# Patient Record
Sex: FEMALE | Race: WHITE | NOT HISPANIC OR LATINO | Employment: FULL TIME | URBAN - METROPOLITAN AREA
[De-identification: names, ages, dates, MRNs, and addresses within clinical notes are randomized per-mention and may not be internally consistent; named-entity substitution may affect disease eponyms.]

---

## 2017-01-09 ENCOUNTER — ALLSCRIPTS OFFICE VISIT (OUTPATIENT)
Dept: OTHER | Facility: OTHER | Age: 27
End: 2017-01-09

## 2017-02-02 ENCOUNTER — ALLSCRIPTS OFFICE VISIT (OUTPATIENT)
Dept: OTHER | Facility: OTHER | Age: 27
End: 2017-02-02

## 2017-04-13 ENCOUNTER — GENERIC CONVERSION - ENCOUNTER (OUTPATIENT)
Dept: OTHER | Facility: OTHER | Age: 27
End: 2017-04-13

## 2017-04-13 ENCOUNTER — ALLSCRIPTS OFFICE VISIT (OUTPATIENT)
Dept: OTHER | Facility: OTHER | Age: 27
End: 2017-04-13

## 2017-04-13 DIAGNOSIS — N63.0 BREAST LUMP: ICD-10-CM

## 2017-04-24 ENCOUNTER — ALLSCRIPTS OFFICE VISIT (OUTPATIENT)
Dept: OTHER | Facility: OTHER | Age: 27
End: 2017-04-24

## 2017-04-24 ENCOUNTER — LAB REQUISITION (OUTPATIENT)
Dept: LAB | Facility: HOSPITAL | Age: 27
End: 2017-04-24
Payer: COMMERCIAL

## 2017-04-24 DIAGNOSIS — J02.9 ACUTE PHARYNGITIS: ICD-10-CM

## 2017-04-24 LAB — S PYO AG THROAT QL: NEGATIVE

## 2017-04-24 PROCEDURE — 87070 CULTURE OTHR SPECIMN AEROBIC: CPT | Performed by: NURSE PRACTITIONER

## 2017-04-24 PROCEDURE — 87147 CULTURE TYPE IMMUNOLOGIC: CPT | Performed by: NURSE PRACTITIONER

## 2017-04-25 LAB — BACTERIA THROAT CULT: NORMAL

## 2017-04-26 ENCOUNTER — GENERIC CONVERSION - ENCOUNTER (OUTPATIENT)
Dept: OTHER | Facility: OTHER | Age: 27
End: 2017-04-26

## 2017-07-13 ENCOUNTER — ALLSCRIPTS OFFICE VISIT (OUTPATIENT)
Dept: OTHER | Facility: OTHER | Age: 27
End: 2017-07-13

## 2017-08-10 ENCOUNTER — ALLSCRIPTS OFFICE VISIT (OUTPATIENT)
Dept: OTHER | Facility: OTHER | Age: 27
End: 2017-08-10

## 2017-09-06 ENCOUNTER — ALLSCRIPTS OFFICE VISIT (OUTPATIENT)
Dept: OTHER | Facility: OTHER | Age: 27
End: 2017-09-06

## 2017-09-06 DIAGNOSIS — Z13.0 ENCOUNTER FOR SCREENING FOR DISEASES OF THE BLOOD AND BLOOD-FORMING ORGANS AND CERTAIN DISORDERS INVOLVING THE IMMUNE MECHANISM: ICD-10-CM

## 2017-09-06 DIAGNOSIS — Z13.228 ENCOUNTER FOR SCREENING FOR OTHER METABOLIC DISORDERS: ICD-10-CM

## 2017-09-06 DIAGNOSIS — Z13.6 ENCOUNTER FOR SCREENING FOR CARDIOVASCULAR DISORDERS: ICD-10-CM

## 2017-09-06 DIAGNOSIS — Z13.29 ENCOUNTER FOR SCREENING FOR OTHER SUSPECTED ENDOCRINE DISORDER: ICD-10-CM

## 2017-12-19 ENCOUNTER — GENERIC CONVERSION - ENCOUNTER (OUTPATIENT)
Dept: OTHER | Facility: OTHER | Age: 27
End: 2017-12-19

## 2018-01-11 NOTE — PROGRESS NOTES
Assessment    1  Encounter for preventive health examination (V70 0) (Z00 00)   2  Bug bite (919 4,E906 4) (W57 XXXA)    Plan  Bug bite    · Mupirocin 2 % External Ointment; APPLY SPARINGLY TO AFFECTED AREA(S)  TWICE DAILY  Flu vaccine need    · Fluzone Quadrivalent Intramuscular Suspension  PPD screening test    · PPD  Screening for cardiovascular condition    · (1) LIPID PANEL, FASTING; Status:Active; Requested VNX:[id number];   Screening for endocrine/metabolic/immunity disorders    · (1) CBC/ PLT (NO DIFF); Status:Active; Requested HAVEN:76APF0715;    · (1) COMPREHENSIVE METABOLIC PANEL; Status:Active; Requested FJJ:[id number];     Discussion/Summary  health maintenance visit healthy adult female Currently, she eats a poor diet and has an inadequate exercise regimen  cervical cancer screening is current Breast cancer screening: breast cancer screening is not indicated  Colorectal cancer screening: colorectal cancer screening is not indicated  Osteoporosis screening: bone mineral density testing is not indicated  Screening lab work includes hemoglobin, glucose and lipid profile  The immunizations are needed and immunizations will be given as outlined in the orders  Advice and education were given regarding nutrition and aerobic exercise  Patient discussion: discussed with the patient  PPD administered and to be read in 48-72 hours  Work PE form filled out and can be picked up when PPD read  Right foot with what appears as bug bite that blistered and opened  Will prescribe topical antibiotic  Instructed to call with any worsening redness or drainage  Chief Complaint  Pt is here for work PE  History of Present Illness  HM, Adult Female: The patient is being seen for a health maintenance evaluation  Social History: Household members include domestic partner  She is unmarried  Work status: working full time and occupation:   General Health:  The patient's health since the last visit is described as good  She has regular dental visits  She denies vision problems  She denies hearing loss  Immunizations status: up to date  Lifestyle:  She does not have a healthy diet  Diet problems: needs elimination of junk food and high in salt  She has weight concerns  She does not exercise regularly  She uses tobacco  She denies alcohol use  She denies drug use  Reproductive health: the patient is premenopausal   she uses contraception  For contraception, she uses oral contraception pills  she is sexually active  She is monogamous with a male partner  Screening: Cervical cancer screening includes a pap smear performed 2015  Breast cancer screening includes no previous mammogram  She hasn't been previously screened for colorectal cancer  Metabolic screening includes no previous lipid profile, no previous glucose screening and no previous thyroid function test      Cardiovascular risk factors: obesity and sedentary lifestyle  Risk findings: no anxiety symptoms, no depression symptoms, no travel to developing areas and no tuberculosis exposure  HPI: Starting new job as   Has a bug bite on right foot  Appeared 3 days ago  started as bump and over night became hot and itchy  Woke the next day with an open blister  Draining  washed with antibiotic soap and has been putting Neosporin on and keeping it covered  Does not seem to be getting any worse just not getting better  Denies fever,chills  Review of Systems    Constitutional: no fever, not feeling poorly, no chills and not feeling tired  Eyes: No complaints of eye pain, no red eyes, no eyesight problems, no discharge, no dry eyes, no itching of eyes  ENT: earache, but no sore throat, no hearing loss and no nasal discharge  Cardiovascular: no chest pain, no palpitations and no lower extremity edema  Respiratory: no shortness of breath, no cough and no wheezing     Gastrointestinal: no abdominal pain, no nausea, no constipation and no diarrhea  Genitourinary: no dysuria and no pelvic pain  Musculoskeletal: no arthralgias and no myalgias  Integumentary: itching, skin lesion and skin wound, but no rashes, no breast pain and no breast lump  Neurological: no headache, no numbness, no tingling, no dizziness, no limb weakness, no fainting and no difficulty walking  Psychiatric: no anxiety, no sleep disturbances and no depression  Hematologic/Lymphatic: No complaints of swollen glands, no swollen glands in the neck, does not bleed easily, does not bruise easily  Active Problems    1  ADD (attention deficit disorder) without hyperactivity (314 00) (F98 8)   2  Depression screening (V79 0) (Z13 89)   3  Encounter for gynecological examination without abnormal finding (V72 31) (Z01 419)   4  External hemorrhoids (455 3) (K64 4)   5  Flu vaccine need (V04 81) (Z23)   6  Generalized anxiety disorder (300 02) (F41 1)   7  Obesity (278 00) (E66 9)   8  Oral contraceptive use (V25 41) (Z30 41)   9   Surveillance of previously prescribed contraceptive method (V25 40) (Z30 40)    Past Medical History    · History of Acute bronchitis due to other specified organisms (466 0) (J20 8)   · History of Acute pharyngitis, unspecified etiology (462) (J02 9)   · History of Alcohol abuse (305 00) (F10 10)   · History of Cyst of breast, left (610 0) (N60 02)   · History of backache (V13 59) (Z87 39)   · History of depression (V11 8) (Z86 59)   · History of lump of left breast (V13 89) (X56 806)   · History of nicotine dependence (V15 82) (Z87 891)   · Denied: History of substance abuse    Surgical History    · History of Tonsillectomy    Family History  Mother    · Family history of Alcohol abuse   · Family history of depression (V17 0) (Z81 8)   · Family history of substance abuse (V17 0) (Z81 4)  Father    · Family history of Alcohol abuse   · Family history of depression (V17 0) (Z81 8)   · Family history of substance abuse (V17 0) (Z81 4)  Sibling    · Family history of Alcohol abuse   · Family history of depression (V17 0) (Z81 8)   · Family history of substance abuse (V17 0) (Z81 4)  Paternal Grandfather    · Family history of Coronary artery disease   · Family history of arthritis (V17 7) (Z82 61)  Family History    · Family history of arthritis (V17 7) (Z82 61)    Social History    · Current every day smoker (305 1) (F17 200)   · Daily caffeine consumption, 2-3 servings a day   · History of Former smoker (V15 82) (T79 696)   · Full-time employment   · Oral contraceptive use (V25 41) (Z30 41)    Current Meds   1  EC-Naprosyn 500 MG Oral Tablet Delayed Release; TAKE 1 TABLET TWICE DAILY   AFTER MEALS AS NEEDED; Therapy: 46FON3579 to (Evaluate:67Emb3256)  Requested for: 72MYL7288; Last   Rx:79Trw6659 Ordered   2  Levonorgestrel-Ethinyl Estrad 0 1-20 MG-MCG Oral Tablet; TAKE 1 TABLET DAILY; Therapy: 79JOS3133 to (Evaluate:08Jun2017)  Requested for: 13Apr2017; Last   Rx:84Dvh7915 Ordered   3  Multiple Vitamins Oral Tablet; Take 1 daily Recorded   4  ProAir  (90 Base) MCG/ACT Inhalation Aerosol Solution; INHALE 2 PUFFS   EVERY 4 HOURS AS NEEDED FOR COUGH AND WHEEZE;   Therapy: 76Pyd3956 to (Last Rx:03Iki5277)  Requested for: 72Ocq7702 Ordered   5  Sertraline HCl - 50 MG Oral Tablet; take 1 1/2 tablets daily; Therapy: 68FCP7524 to (Evaluate:30Jan2018)  Requested for: 87Mxq4983; Last   Rx:99Tuu0727 Ordered   6  Vyvanse 50 MG Oral Capsule; TAKE 1 CAPSULE DAILY IN THE MORNING; Therapy: 88RFV8850 to (Evaluate:01Oct2017); Last Rx:67Nfe4507 Ordered    Allergies    1  No Known Drug Allergies    Vitals   Recorded: 17OOT0118 08:37AM   Temperature 99 1 F   Heart Rate 72   Systolic 247   Diastolic 68   Height 5 ft 10 in   Weight 233 lb    BMI Calculated 33 43   BSA Calculated 2 23     Physical Exam    Constitutional   General appearance: No acute distress, well appearing and well nourished      Eyes   Conjunctiva and lids: No swelling, erythema or discharge  Pupils and irises: Equal, round, reactive to light  Ears, Nose, Mouth, and Throat   External inspection of ears and nose: Normal     Otoscopic examination: Tympanic membranes translucent with normal light reflex  Canals patent without erythema  Lips, teeth, and gums: Normal, good dentition  Oropharynx: Normal with no erythema, edema, exudate or lesions  Neck   Neck: Supple, symmetric, trachea midline, no masses  Thyroid: Normal, no thyromegaly  Pulmonary   Respiratory effort: No increased work of breathing or signs of respiratory distress  Auscultation of lungs: Clear to auscultation  Cardiovascular   Auscultation of heart: Normal rate and rhythm, normal S1 and S2, no murmurs  Carotid pulses: 2+ bilaterally  no bruit heard over the right carotid and no bruit heard over the left carotid  Examination of extremities for edema and/or varicosities: Normal     Abdomen   Abdomen: Non-tender, no masses  Liver and spleen: No hepatomegaly or splenomegaly  Lymphatic   Palpation of lymph nodes in neck: No lymphadenopathy  Musculoskeletal   Gait and station: Normal     Digits and nails: Normal without clubbing or cyanosis  Joints, bones, and muscles: Normal     Stability: Normal     Muscle strength/tone: Normal     Skin   Examination of the skin for lesions: Abnormal   Right medal aspect of foot with 4 cm open bullae with some redness however no surrounding erythema     Psychiatric   Orientation to person, place, and time: Normal     Mood and affect: Normal        Future Appointments    Date/Time Provider Specialty Site   09/09/2017 08:20 Marcela Arellano, Nurse Schedule  Charles Ruiz MD   12/14/2017 01:00 PM Duncan Quinn, [address] [facility] Cristóbal Sauceda MD     Signatures   Electronically signed by : Bertin Muniz ; Sep  6 2017 10:34AM EST                       (Author)    Electronically signed by : Lorraine Marcum DO; Sep  6 2017  3:20PM EST (Author)

## 2018-01-12 VITALS
HEIGHT: 70 IN | WEIGHT: 233.2 LBS | TEMPERATURE: 98 F | HEART RATE: 78 BPM | BODY MASS INDEX: 33.39 KG/M2 | SYSTOLIC BLOOD PRESSURE: 122 MMHG | DIASTOLIC BLOOD PRESSURE: 70 MMHG

## 2018-01-12 VITALS
WEIGHT: 235.4 LBS | DIASTOLIC BLOOD PRESSURE: 72 MMHG | HEART RATE: 70 BPM | BODY MASS INDEX: 33.7 KG/M2 | TEMPERATURE: 98.2 F | HEIGHT: 70 IN | SYSTOLIC BLOOD PRESSURE: 126 MMHG

## 2018-01-12 VITALS
TEMPERATURE: 99.1 F | HEIGHT: 70 IN | HEART RATE: 72 BPM | WEIGHT: 233 LBS | SYSTOLIC BLOOD PRESSURE: 128 MMHG | BODY MASS INDEX: 33.36 KG/M2 | DIASTOLIC BLOOD PRESSURE: 68 MMHG

## 2018-01-13 VITALS
SYSTOLIC BLOOD PRESSURE: 132 MMHG | WEIGHT: 244.57 LBS | TEMPERATURE: 98.1 F | HEIGHT: 70 IN | BODY MASS INDEX: 35.01 KG/M2 | DIASTOLIC BLOOD PRESSURE: 84 MMHG | HEART RATE: 80 BPM

## 2018-01-13 NOTE — RESULT NOTES
Message   Patient aware  Verified Results  * XR SPINE LUMBAR MINIMUM 4 VIEWS [id number] 09:29AM Darrell Camacho Order Number: [id number]     Test Name Result Flag Reference   XR SPINE LUMBAR MINIMUM 4 VIEWS (Report)     LUMBAR SPINE     INDICATION: Lower back pain  For one month, no known injury     COMPARISON: None     VIEWS: AP, lateral, bilateral oblique and coned down projections; 5 images     FINDINGS:   There are 4 nonrib-bearing lumbar vertebrae  Alignment is unremarkable  There is no radiographic evidence of acute fracture or destructive osseous lesion  No significant lumbar degenerative change noted  Visualized soft tissues appear unremarkable  IMPRESSION:     No acute findings  Workstation performed: [id number]     Signed by:   Talisha Becerril MD   7/26/16       Discussion/Summary   Xrays are normal- no bone tumors or disc disease

## 2018-01-14 VITALS
DIASTOLIC BLOOD PRESSURE: 76 MMHG | BODY MASS INDEX: 34.5 KG/M2 | TEMPERATURE: 98 F | HEART RATE: 74 BPM | SYSTOLIC BLOOD PRESSURE: 122 MMHG | WEIGHT: 241 LBS | HEIGHT: 70 IN

## 2018-01-14 NOTE — MISCELLANEOUS
Message   Recorded as Task   Date: 05/24/2016 08:45 AM, Created By: Uriel Bell   Task Name: Medical Complaint Callback   Assigned To: [facility],Team   Regarding Patient: Porsha Reynolds, Status: Active   Comment:    Jennifer Cristobal - 24 May 2016 8:45 AM     TASK CREATED  Caller: Self; [phone] (Home)  JENNIFER LEFT A MESSAGE ASKING FOR A CALL BACK ABOUT HER MEDS  JaredEdilia - 24 May 2016 9:52 AM     TASK EDITED  Pt inquiring about her med Vyanase  I checked the annotation and Wesly Llanos mailed it to [facility] on May 11th  Pt aware        Active Problems    1  ADD (attention deficit disorder) without hyperactivity (314 00) (F90 0)   2  Depression screening (V79 0) (Z13 89)   3  Encounter for gynecological examination without abnormal finding (V72 31) (Z01 419)   4  Flu vaccine need (V04 81) (Z23)   5  Generalized anxiety disorder (300 02) (F41 1)   6  Obesity (278 00) (E66 9)   7  Oral contraceptive use (V25 41) (Z30 41)    Current Meds   1  Levonorgestrel-Ethinyl Estrad 0 1-20 MG-MCG Oral Tablet; TAKE 1 TABLET DAILY AS   DIRECTED; Therapy: 34QRJ3212 to (Cas Avila)  Requested for: 29UXB1195; Last   Rx:91Miv7422 Ordered   2  Multiple Vitamins Oral Tablet; Take 1 daily Recorded   3  Sertraline HCl - 50 MG Oral Tablet; take one tablet by mouth one time daily; Therapy: 62NMQ7057 to ([facility])  Requested for: 74FFU0596; Last   Rx:05Eys9820 Ordered   4  Vyvanse 30 MG Oral Capsule; TAKE 1 CAPSULE DAILY IN THE MORNING; Therapy: 58DRW1070 to (Evaluate:58Dqt2562); Last Rx:73Rlp1357 Ordered    Allergies    1   No Known Drug Allergies    Signatures   Electronically signed by : Caroline Rodrigues; May 24 2016  4:59PM EST                       (Author)

## 2018-01-14 NOTE — PROGRESS NOTES
Assessment    1  ADD (attention deficit disorder) without hyperactivity (314 00) (F90 0)   2  Generalized anxiety disorder (300 02) (F41 1)    Discussion/Summary    ADD very well controlled on Vyvanse  Will continue on this dose  Anxiety well controlled  Will continue on current sertraline dose  F/U in 4 months  Possible side effects of new medications were reviewed with the patient/guardian today  The treatment plan was reviewed with the patient/guardian  The patient/guardian understands and agrees with the treatment plan   The patient was counseled regarding instructions for management, impressions, risks and benefits of treatment options, importance of compliance with treatment  total time of encounter was 15 minutes and 10 minutes was spent counseling  Chief Complaint  Patient is here today for follow up of chronic conditions described in HPI  History of Present Illness  States had stressful couple of months with death of grandmother and boyfriend's aunt  States she feel she handles it very well  Did feel more scattered  States she is very pleased with Vyvanse  Takes at 7 am and feels effect until about 5 pm  Does sometimes work until 8 but doesn't have any trouble or feel the need for a booster dose  Won't take med if she is laying around and doesn't have anything to do  States prescription is expensive though  Very happy with results so does not want to switch  States anxiety has been very well controlled especially with current events  Feels happy  The patient's ADHD subtype is the predominantly inattentive type  Her ADHD has been well controlled since the last visit  Comorbid Illnesses: anxiety  Target Symptoms:   1  Hyperactive behavior is denied  2  Impulsive behavior is denied  3  Difficulty concentrating is denied  Medications: Vyvanse  The patient takes her medications most of the time   Medication side effects include weight loss, but no anorexia, no headache, no problems sleeping, no abdominal pain and no nausea  Review of Systems    Constitutional: as noted in HPI  Gastrointestinal: as noted in HPI  Neurological: as noted in HPI  Psychiatric: as noted in HPI  Active Problems    1  ADD (attention deficit disorder) without hyperactivity (314 00) (F90 0)   2  Depression screening (V79 0) (Z13 89)   3  Encounter for gynecological examination without abnormal finding (V72 31) (Z01 419)   4  Flu vaccine need (V04 81) (Z23)   5  Generalized anxiety disorder (300 02) (F41 1)   6  Nicotine dependence (305 1) (F17 200)   7  Obesity (278 00) (E66 9)   8  Oral contraceptive use (V25 41) (Z30 41)    Past Medical History    1  History of Cyst of breast, left (610 0) (N60 02)    Surgical History    1  History of Tonsillectomy    Family History    1  Family history of Coronary artery disease   2  Family history of arthritis (V17 7) (Z82 61)    3  Family history of arthritis (V17 7) (Z82 61)    Social History    · Current every day smoker (305 1) (F17 200)   · Daily caffeine consumption, 2-3 servings a day   · Full-time employment   · Oral contraceptive use (V25 41) (Z30 41)  The social history was reviewed and updated today  The social history was reviewed and is unchanged  Current Meds   1  Levonorgestrel-Ethinyl Estrad 0 1-20 MG-MCG Oral Tablet; TAKE 1 TABLET DAILY AS   DIRECTED; Therapy: 99YCB7417 to (Evaluate:12Jun2016); Last Rx:18Jun2015 Ordered   2  Multiple Vitamins Oral Tablet; Take 1 daily Recorded   3  Sertraline HCl - 50 MG Oral Tablet; take one tablet by mouth one time daily; Therapy: 15ZMQ3750 to (Evaluate:01Apr2016); Last Rx:04Oct2015; Status: ACTIVE -   Renewal Voided Ordered   4  Vyvanse 30 MG Oral Capsule; TAKE 1 CAPSULE DAILY IN THE MORNING; Therapy: 84KAD3263 to (Evaluate:34Zrr4138); Last Rx:06Jan2016 Ordered    The medication list was reviewed and updated today  Allergies    1   No Known Drug Allergies    Vitals  Vital Signs Aashish Fountain Includes: Current Encounter]    Recorded: 21Jan2016 09:58AM   Temperature 98 5 F   Heart Rate 82   Respiration 12   Systolic 938   Diastolic 60   Height 5 ft 10 in   Weight 217 lb    BMI Calculated 31 14   BSA Calculated 2 16     Physical Exam    Constitutional   General appearance: No acute distress, well appearing and well nourished  Psychiatric   Orientation to person, place, and time: Normal     Mood and affect: Normal   Pleasant  Conversive with good eye contact          Future Appointments    Date/Time Provider Specialty Site   05/11/2016 09:40 AM Paige Carrillo, [address] [facility] Cristóbal Sauceda MD     Signatures   Electronically signed by : Bertin Parson ; Jan 21 2016 10:24AM EST                       (Author)    Electronically signed by : Jerod Leung DO; Jan 21 2016 10:35AM EST                       (Author)

## 2018-01-15 NOTE — MISCELLANEOUS
Provider Comments  Provider Comments:   Pt was a no show for 2 month f/u        Signatures   Electronically signed by : Bertin [address]; Nov 10 2016  9:22AM EST                       (Author)

## 2018-01-15 NOTE — RESULT NOTES
Verified Results  (1) THROAT CULTURE (CULTURE, UPPER RESPIRATORY) 24Apr2017 03:00PM Roxann Stephens     Test Name Result Flag Reference   CLINICAL REPORT (Report)     Test:        Throat culture  Specimen Type:   Throat  Specimen Date:   4/24/2017 3:00 PM  Result Date:    4/25/2017 10:05 AM  Result Status:   Final result  Resulting Lab:   [place] [address]            Tel: [phone]      CULTURE                                       ------------------                                   3+ Growth of Beta Hemolytic Streptococcus Group A     *** This organism is intrinisically susceptible to Penicillin  If sensitivites to other antibiotics are required, please call the      Microbiology Department at [phone] within 5 days   ***

## 2018-01-17 NOTE — MISCELLANEOUS
Provider Comments  Provider Comments:   Pt was again a no show for f/u appointment        Signatures   Electronically signed by : Carli Herman, 10 Jarrett Perez; Jan 9 2017  3:07PM EST                       (Author)

## 2018-01-17 NOTE — MISCELLANEOUS
Message  Return to work or school:   Alcira Smith is under my professional care  She was seen in my office on 9/6/17   She is able to return to work on  9/6/27       Bertin Romero        Signatures   Electronically signed by : Shirley Bajwa; Sep  6 2017  9:09AM EST                       (Author)

## 2018-01-18 NOTE — MISCELLANEOUS
Message  Return to work or school:   Nick Zamorano is under my professional care  She was seen in my office on 7/19/16   She is able to return to work on  In one to 2 days              Future Appointments    Signatures   Electronically signed by : Kat Pan MD; Jul 20 2016 10:06AM EST                       (Author)

## 2018-01-22 VITALS
SYSTOLIC BLOOD PRESSURE: 124 MMHG | TEMPERATURE: 97.3 F | BODY MASS INDEX: 34.22 KG/M2 | HEART RATE: 78 BPM | DIASTOLIC BLOOD PRESSURE: 72 MMHG | HEIGHT: 70 IN | WEIGHT: 239 LBS

## 2018-01-23 NOTE — PROGRESS NOTES
Assessment    1  Generalized anxiety disorder (300 02) (F41 1)   2  ADD (attention deficit disorder) without hyperactivity (314 00) (F98 8)   3  Left breast lump (611 72) (N63)    Plan   ADD (attention deficit disorder) without hyperactivity, Health Maintenance, Generalized  anxiety disorder    · Vyvanse 40 MG Oral Capsule; Take one capsule each morning  SocHx: Oral contraceptive use    · Levonorgestrel-Ethinyl Estrad 0 1-20 MG-MCG Oral Tablet; TAKE 1 TABLET  DAILY    *US BREAST LEFT LIMITED (DIAGNOSTIC); Status:Hold For - Scheduling; Requested for:13Apr2017;   Perform:[facility] Radiology; Due:13Apr2018; Ordered; For:Left breast lump; Ordered By:Elida Vaca;      Discussion/Summary    RUDOLPH improved with improvement in depressive symptoms that surfaced at last OV  Continue on 75 mg sertraline  ADHD controlling symptoms at work  Suspect difficulty with organization at home is other etiology and not r/t ADHD  Will continue with current Vyvanse dose  Will US left breast likely just fibrocystic  F/U in 3 months  Due for GYN and recommend she schedule  Possible side effects of new medications were reviewed with the patient/guardian today  The treatment plan was reviewed with the patient/guardian  The patient/guardian understands and agrees with the treatment plan      Chief Complaint  Patient is here today for follow up of chronic conditions described in HPI  History of Present Illness  Good concentration and focus at work  Feels more scattered at home  Denies panic attacks  Anxiety much better  Denies depressive symptoms  Improved after dose increase  Denies SI  Has issues with breast cysts  Left breast feels more lumpy  Not painful  Definitely feels different  States she US years ago that showed cysts  The patient is being seen for a routine clinic follow-up of anxiety disorder  It is classified as generalized anxiety disorder   Management changes made at the last visit include changing the dose of sertraline  Symptoms:  no palpitations, no chest discomfort, no trouble breathing, no abdominal discomfort, no excessive worrying, no fear of losing control and no sleep disturbance  Suicidal risk:  no suicidal thoughts  Homicidal risk:  no homicidal thoughts  Current treatment includes selective serotonin reuptake inhibitors  There are no medication side effects  Pertinent psychiatric history:  ADHD  Review of Systems    Constitutional: recent 5 lb weight loss  Integumentary: as noted in HPI  Active Problems    1  ADD (attention deficit disorder) without hyperactivity (314 00) (F98 8)   2  Depression screening (V79 0) (Z13 89)   3  Encounter for gynecological examination without abnormal finding (V72 31) (Z01 419)   4  Flu vaccine need (V04 81) (Z23)   5  Generalized anxiety disorder (300 02) (F41 1)   6  Obesity (278 00) (E66 9)   7  Oral contraceptive use (V25 41) (Z30 41)    Past Medical History    1  History of Acute bronchitis due to other specified organisms (466 0) (J20 8)   2  History of Cyst of breast, left (610 0) (N60 02)   3  History of backache (V13 59) (Z87 39)   4  History of nicotine dependence (V15 82) (P54 637)    Surgical History    1  History of Tonsillectomy    Family History  Paternal Grandfather    1  Family history of Coronary artery disease   2  Family history of arthritis (V17 7) (Z82 61)  Family History    3  Family history of arthritis (V17 7) (Z82 61)    Social History    · Current every day smoker (305 1) (F17 200)   · Daily caffeine consumption, 2-3 servings a day   · History of Former smoker (T15 52) (A08 582)   · Full-time employment   · Oral contraceptive use (V25 41) (Z30 41)  The social history was reviewed and updated today  The social history was reviewed and is unchanged  Current Meds   1  EC-Naprosyn 500 MG Oral Tablet Delayed Release; TAKE 1 TABLET TWICE DAILY   AFTER MEALS AS NEEDED;    Therapy: 86KOT5250 to (Evaluate:05Wyo5612)  Requested for: 19GFC7512; Last   Rx:[id number] Ordered   2  Levonorgestrel-Ethinyl Estrad 0 1-20 MG-MCG Oral Tablet; TAKE 1 TABLET DAILY; Therapy: 19BKH0340 to (Ivette Night)  Requested for: 25FGP4322; Last   Rx:[id number] Ordered   3  Multiple Vitamins Oral Tablet; Take 1 daily Recorded   4  ProAir  (90 Base) MCG/ACT Inhalation Aerosol Solution; INHALE 2 PUFFS   EVERY 4 HOURS AS NEEDED FOR COUGH AND WHEEZE;   Therapy: 46Esv2549 to (Last Rx:27Gwr7160)  Requested for: 69Wnc1231 Ordered   5  Sertraline HCl - 50 MG Oral Tablet; take 1 1/2 tablets daily; Therapy: 93MWD7962 to (Evaluate:36Fof6347)  Requested for: 09XJD1716; Last   Rx:37Ugn1327 Ordered   6  Vyvanse 40 MG Oral Capsule; Take one capsule each morning; Therapy: 71NNS9543 to (Evaluate:24Apr2017); Last Rx:24Jan2017 Ordered    The medication list was reviewed and updated today  Allergies    1  No Known Drug Allergies    Vitals  Vital Signs    Recorded: 13Apr2017 09:15AM   Temperature 97 3 F, Tympanic   Heart Rate 78   Systolic 875, LUE, Sitting   Diastolic 72, LUE, Sitting   Height 5 ft 10 in   Weight 239 lb    BMI Calculated 34 29   BSA Calculated 2 26     Physical Exam    Constitutional   General appearance: No acute distress, well appearing and well nourished  Pulmonary   Respiratory effort: No increased work of breathing or signs of respiratory distress  Auscultation of lungs: Clear to auscultation  Cardiovascular   Auscultation of heart: Normal rate and rhythm, normal S1 and S2, no murmurs  Chest   Breasts: Normal, no dimpling or skin changes appreciated  Palpation of breasts and axillae: Abnormal   Right breast - No masses palpated in the right breast  Left breast mulitple nodules palpated - Inferior lateral quadrant , rubbery, mobile, nontender mass     Psychiatric   Orientation to person, place, and time: Normal     Mood and affect: Normal        Future Appointments    Date/Time Provider Specialty Site   05/18/2017 01:00 PM Winter, Joby Ulloa MD   07/13/2017 11:00 AM Yasmeen Franco, [address] Sauceda MD     Signatures   Electronically signed by : [facility]in Stephenson [facility] ; Apr 13 2017 10:54AM EST                       (Author)    Electronically signed by : Heather Saba DO;  Apr 13 2017 11:25AM EST                       (Author)

## 2018-01-24 VITALS
HEIGHT: 70 IN | DIASTOLIC BLOOD PRESSURE: 80 MMHG | BODY MASS INDEX: 33.61 KG/M2 | HEART RATE: 80 BPM | WEIGHT: 234.8 LBS | TEMPERATURE: 98.7 F | SYSTOLIC BLOOD PRESSURE: 132 MMHG | RESPIRATION RATE: 14 BRPM

## 2018-02-05 DIAGNOSIS — F90.2 ATTENTION DEFICIT HYPERACTIVITY DISORDER (ADHD), COMBINED TYPE: Primary | ICD-10-CM

## 2018-02-18 RX ORDER — NAPROXEN 500 MG/1
1 TABLET ORAL 2 TIMES DAILY
COMMUNITY
Start: 2016-07-19 | End: 2018-05-03 | Stop reason: ALTCHOICE

## 2018-02-18 RX ORDER — LEVONORGESTREL AND ETHINYL ESTRADIOL 0.1-0.02MG
1 KIT ORAL DAILY
COMMUNITY
Start: 2017-10-05 | End: 2018-05-18 | Stop reason: SDUPTHER

## 2018-03-02 DIAGNOSIS — F41.1 GENERALIZED ANXIETY DISORDER: Primary | ICD-10-CM

## 2018-03-06 DIAGNOSIS — F90.2 ATTENTION DEFICIT HYPERACTIVITY DISORDER (ADHD), COMBINED TYPE: ICD-10-CM

## 2018-04-05 DIAGNOSIS — F90.2 ATTENTION DEFICIT HYPERACTIVITY DISORDER (ADHD), COMBINED TYPE: ICD-10-CM

## 2018-05-02 ENCOUNTER — TELEPHONE (OUTPATIENT)
Dept: FAMILY MEDICINE CLINIC | Facility: HOSPITAL | Age: 28
End: 2018-05-02

## 2018-05-02 DIAGNOSIS — F41.1 GENERALIZED ANXIETY DISORDER: ICD-10-CM

## 2018-05-03 ENCOUNTER — OFFICE VISIT (OUTPATIENT)
Dept: FAMILY MEDICINE CLINIC | Facility: HOSPITAL | Age: 28
End: 2018-05-03
Payer: COMMERCIAL

## 2018-05-03 VITALS
WEIGHT: 220 LBS | HEIGHT: 70 IN | TEMPERATURE: 97.8 F | BODY MASS INDEX: 31.5 KG/M2 | SYSTOLIC BLOOD PRESSURE: 124 MMHG | HEART RATE: 74 BPM | DIASTOLIC BLOOD PRESSURE: 72 MMHG

## 2018-05-03 DIAGNOSIS — F90.2 ATTENTION DEFICIT HYPERACTIVITY DISORDER (ADHD), COMBINED TYPE: ICD-10-CM

## 2018-05-03 DIAGNOSIS — F41.1 GENERALIZED ANXIETY DISORDER: ICD-10-CM

## 2018-05-03 DIAGNOSIS — F98.8 ADD (ATTENTION DEFICIT DISORDER) WITHOUT HYPERACTIVITY: Primary | ICD-10-CM

## 2018-05-03 PROCEDURE — 99213 OFFICE O/P EST LOW 20 MIN: CPT | Performed by: NURSE PRACTITIONER

## 2018-05-03 NOTE — ASSESSMENT & PLAN NOTE
Overall improvement in concentration and focus with increase in Vyvanse  Planning pregnancy in a few months  Will meet again in 4 months to discuss plan  Vyvanse not contraindicated in pregnancy but  withdrawal syndrome is a risk  Will discuss risk/benefit with pt and have her discuss with OB before attempting pregnancy  F/U in 4 months

## 2018-05-03 NOTE — PROGRESS NOTES
Assessment/Plan:    ADD (attention deficit disorder) without hyperactivity  Overall improvement in concentration and focus with increase in Vyvanse  Planning pregnancy in a few months  Will meet again in 4 months to discuss plan  Vyvanse not contraindicated in pregnancy but  withdrawal syndrome is a risk  Will discuss risk/benefit with pt and have her discuss with OB before attempting pregnancy  F/U in 4 months  Generalized anxiety disorder  Anxiety controlled  Will continue sertraline dose  Sertraline safe in pregnancy  Will f/u in 4 months  Diagnoses and all orders for this visit:    ADD (attention deficit disorder) without hyperactivity    Generalized anxiety disorder    Attention deficit hyperactivity disorder (ADHD), combined type  -     lisdexamfetamine (VYVANSE) 60 MG capsule; Take 1 capsule (60 mg total) by mouth daily Max Daily Amount: 60 mg          Subjective:      Patient ID: Anthony Medley is a 33 y o  female  Last OV increased Vyvanse  States concentration and focus improved  Anxiety improved  Still with some anxiety due to wedding planning  Planning to get pregnant after August of this year  Newly engaged  Planning on getting  in August  Denies depression  The following portions of the patient's history were reviewed and updated as appropriate: allergies, current medications, past family history, past medical history, past social history, past surgical history and problem list     Review of Systems   Constitutional: Negative for fatigue and unexpected weight change  Psychiatric/Behavioral: Negative for decreased concentration, dysphoric mood and suicidal ideas  The patient is not nervous/anxious and is not hyperactive  Objective:  Vitals:    18 0900   BP: 124/72   Pulse: 74   Temp: 97 8 °F (36 6 °C)      Physical Exam   Constitutional: She is oriented to person, place, and time  She appears well-developed and well-nourished  Cardiovascular: Normal rate, regular rhythm and normal heart sounds  Pulmonary/Chest: Effort normal and breath sounds normal    Neurological: She is alert and oriented to person, place, and time  Skin: Skin is warm and dry  Psychiatric: She has a normal mood and affect   Her behavior is normal  Judgment and thought content normal

## 2018-05-03 NOTE — ASSESSMENT & PLAN NOTE
Anxiety controlled  Will continue sertraline dose  Sertraline safe in pregnancy  Will f/u in 4 months

## 2018-05-18 DIAGNOSIS — Z30.41 ENCOUNTER FOR SURVEILLANCE OF CONTRACEPTIVE PILLS: Primary | ICD-10-CM

## 2018-05-20 RX ORDER — LEVONORGESTREL AND ETHINYL ESTRADIOL 0.1-0.02MG
KIT ORAL
Qty: 28 TABLET | Refills: 3 | Status: SHIPPED | OUTPATIENT
Start: 2018-05-20 | End: 2018-08-27 | Stop reason: SDUPTHER

## 2018-05-29 DIAGNOSIS — F41.1 GENERALIZED ANXIETY DISORDER: ICD-10-CM

## 2018-06-01 DIAGNOSIS — F90.2 ATTENTION DEFICIT HYPERACTIVITY DISORDER (ADHD), COMBINED TYPE: ICD-10-CM

## 2018-06-15 ENCOUNTER — OFFICE VISIT (OUTPATIENT)
Dept: FAMILY MEDICINE CLINIC | Facility: HOSPITAL | Age: 28
End: 2018-06-15
Payer: COMMERCIAL

## 2018-06-15 VITALS
WEIGHT: 220 LBS | HEIGHT: 70 IN | SYSTOLIC BLOOD PRESSURE: 128 MMHG | HEART RATE: 72 BPM | TEMPERATURE: 98.3 F | DIASTOLIC BLOOD PRESSURE: 72 MMHG | BODY MASS INDEX: 31.5 KG/M2

## 2018-06-15 DIAGNOSIS — L23.7 CONTACT DERMATITIS DUE TO POISON IVY: Primary | ICD-10-CM

## 2018-06-15 PROCEDURE — 99213 OFFICE O/P EST LOW 20 MIN: CPT | Performed by: INTERNAL MEDICINE

## 2018-06-15 PROCEDURE — 3008F BODY MASS INDEX DOCD: CPT | Performed by: INTERNAL MEDICINE

## 2018-06-15 RX ORDER — PREDNISONE 10 MG/1
TABLET ORAL
Qty: 24 TABLET | Refills: 0 | Status: SHIPPED | OUTPATIENT
Start: 2018-06-15 | End: 2018-11-15 | Stop reason: ALTCHOICE

## 2018-06-15 NOTE — PROGRESS NOTES
Assessment/Plan:       Diagnoses and all orders for this visit:    Contact dermatitis due to poison ivy  Comments:  Advised to start OTC Zyrtec/Claritin/Allegra in am and con't Benadryl prn in evening, can add OTC Cimetidine or Randitidine further for itch, will rx Prednisone as rash con't to spread and now all over face - SE of steroid reviewed with pt,  S/sx of secondary infection reviewed and urged to call if they occur  Orders:  -     predniSONE 10 mg tablet; 3 tab PO x 1 tonight, 2 tab PO bid x 3 then 1 tab PO bid x 3 then 1 tab daily x 3 days then stop          Subjective:      Patient ID: Helen John is a 33 y o  female  HPI Pt here with concern over spreading poison ivy  She was out in the yard moving wood about 2 days ago  She has had poison ivy in the past   She started to note itchy rash on hands - logan btw the fingers  The rash has gone up the forearms and is now on the neck and face  She has a few spots on her legs  She has been using OTC Benadryl as the itch is so severe  Review of Systems   Constitutional: Negative for chills and fever  Respiratory: Negative for cough, shortness of breath and wheezing  Skin: Positive for rash  Objective:    /72   Pulse 72   Temp 98 3 °F (36 8 °C)   Ht 5' 10" (1 778 m)   Wt 99 8 kg (220 lb)   BMI 31 57 kg/m²      Physical Exam   Constitutional: She appears well-developed and well-nourished  No distress  Skin:   Scattered erythematous and flesh colored papules on hands/finger webs/thigh/face/cheek/flank, no pustules or purulent drainage or surrounding erythema/warmth noted   Nursing note and vitals reviewed

## 2018-07-03 DIAGNOSIS — F90.2 ATTENTION DEFICIT HYPERACTIVITY DISORDER (ADHD), COMBINED TYPE: ICD-10-CM

## 2018-08-03 DIAGNOSIS — F90.2 ATTENTION DEFICIT HYPERACTIVITY DISORDER (ADHD), COMBINED TYPE: ICD-10-CM

## 2018-08-27 DIAGNOSIS — F90.2 ATTENTION DEFICIT HYPERACTIVITY DISORDER (ADHD), COMBINED TYPE: ICD-10-CM

## 2018-08-27 DIAGNOSIS — Z30.41 ENCOUNTER FOR SURVEILLANCE OF CONTRACEPTIVE PILLS: ICD-10-CM

## 2018-08-27 DIAGNOSIS — F41.1 GENERALIZED ANXIETY DISORDER: ICD-10-CM

## 2018-08-27 RX ORDER — LEVONORGESTREL AND ETHINYL ESTRADIOL 0.1-0.02MG
1 KIT ORAL DAILY
Qty: 28 TABLET | Refills: 0 | Status: SHIPPED | OUTPATIENT
Start: 2018-08-27 | End: 2019-04-03 | Stop reason: ALTCHOICE

## 2018-08-27 NOTE — TELEPHONE ENCOUNTER
Pt is on her Honeymoon  She left home her meds needs them today    Birth Controll Sam  ADD med Trina Dali Garcia  [facility] [address] [facility] [id number]

## 2018-08-28 ENCOUNTER — TELEPHONE (OUTPATIENT)
Dept: FAMILY MEDICINE CLINIC | Facility: HOSPITAL | Age: 28
End: 2018-08-28

## 2018-08-28 NOTE — TELEPHONE ENCOUNTER
Tried to call pharm to verify they received med but was on hold too long and no one answered    Will try again

## 2018-08-28 NOTE — TELEPHONE ENCOUNTER
Pt called yesterday for 3 medications   She didn't get the Vivance  The pharmacy wont fill it with out a signature from Sarthak Marking    [facility]   [address] [facility]

## 2018-08-28 NOTE — TELEPHONE ENCOUNTER
Spoke to pt was on her way to pharm and would check to see if it was there  Will call back if pharm does not have it

## 2018-09-07 DIAGNOSIS — Z30.41 ENCOUNTER FOR SURVEILLANCE OF CONTRACEPTIVE PILLS: ICD-10-CM

## 2018-09-09 RX ORDER — LEVONORGESTREL AND ETHINYL ESTRADIOL 0.1-0.02MG
KIT ORAL
Qty: 28 TABLET | Refills: 3 | Status: SHIPPED | OUTPATIENT
Start: 2018-09-09 | End: 2018-11-15 | Stop reason: SDUPTHER

## 2018-09-27 DIAGNOSIS — F90.2 ATTENTION DEFICIT HYPERACTIVITY DISORDER (ADHD), COMBINED TYPE: ICD-10-CM

## 2018-10-26 DIAGNOSIS — F90.2 ATTENTION DEFICIT HYPERACTIVITY DISORDER (ADHD), COMBINED TYPE: ICD-10-CM

## 2018-10-29 DIAGNOSIS — F90.2 ATTENTION DEFICIT HYPERACTIVITY DISORDER (ADHD), COMBINED TYPE: ICD-10-CM

## 2018-10-29 NOTE — TELEPHONE ENCOUNTER
Please call pt to reschedule her missed f/u appointments  Has had 2 no shoes  I refilled Vyvanse for 1 month but no further refills until seen

## 2018-11-15 ENCOUNTER — OFFICE VISIT (OUTPATIENT)
Dept: FAMILY MEDICINE CLINIC | Facility: HOSPITAL | Age: 28
End: 2018-11-15
Payer: COMMERCIAL

## 2018-11-15 VITALS
SYSTOLIC BLOOD PRESSURE: 126 MMHG | DIASTOLIC BLOOD PRESSURE: 76 MMHG | TEMPERATURE: 98.2 F | WEIGHT: 225.2 LBS | BODY MASS INDEX: 32.24 KG/M2 | HEIGHT: 70 IN | HEART RATE: 78 BPM

## 2018-11-15 DIAGNOSIS — F41.1 GENERALIZED ANXIETY DISORDER: ICD-10-CM

## 2018-11-15 PROCEDURE — 99213 OFFICE O/P EST LOW 20 MIN: CPT | Performed by: NURSE PRACTITIONER

## 2018-11-15 PROCEDURE — 3008F BODY MASS INDEX DOCD: CPT | Performed by: NURSE PRACTITIONER

## 2018-11-15 NOTE — PROGRESS NOTES
Assessment/Plan:    Generalized anxiety disorder  Well controlled  Continue on current regimen  F/U in 6 months  ADD (attention deficit disorder) without hyperactivity  Well controlled  Continue on current regimen  F/U in 6 months  Diagnoses and all orders for this visit:    Generalized anxiety disorder  -     sertraline (ZOLOFT) 50 mg tablet; Take 1 5 tablets (75 mg total) by mouth daily          Subjective:      Patient ID: Mallory Nyhan is a 33 y o  female  Recently   Things have been good  Holding off on pregnancy for awhile  Wants to buy a house  Denies depressive symptoms  Has gone back to working full time  Anxiety controlled  Had 1 panic attack not that long ago but was able to pull it together  Vyvanse helping with concentration and focus  Good longevity of symptom control  PHQ-9 Depression Screening    PHQ-9:    Frequency of the following problems over the past two weeks:       Little interest or pleasure in doing things:  0 - not at all  Feeling down, depressed, or hopeless:  0 - not at all  Trouble falling or staying asleep, or sleeping too much:  2 - more than half the days  Feeling tired or having little energy:  1 - several days  Poor appetite or overeatin - not at all  Feeling bad about yourself - or that you are a failure or have let yourself or your family down:  0 - not at all  Trouble concentrating on things, such as reading the newspaper or watching television:  0 - not at all  Moving or speaking so slowly that other people could have noticed  Or the opposite - being so fidgety or restless that you have been moving around a lot more than usual:  0 - not at all  Thoughts that you would be better off dead, or of hurting yourself in some way:  0 - not at all  PHQ-2 Score:  0       RUDOLPH-7 Flowsheet Screening      Most Recent Value   Over the last two weeks, how often have you been bothered by the following problems?     Feeling nervous, anxious, or on edge  0 Not being able to stop or control worrying  0   Worrying too much about different things  0   Trouble relaxing   1   Being so restless that it's hard to sit still  0   Becoming easily annoyed or irritable   0   Feeling afraid as if something awful might happen  1   How difficult have these problems made it for you to do your work, take care of things at home, or get along with other people? Somewhat difficult   RUDOLPH Score   2        The following portions of the patient's history were reviewed and updated as appropriate: allergies, current medications, past family history, past medical history, past social history, past surgical history and problem list     Review of Systems   Constitutional: Negative for appetite change and unexpected weight change  Psychiatric/Behavioral: Negative for agitation, decreased concentration, dysphoric mood, sleep disturbance and suicidal ideas  The patient is not nervous/anxious  Objective:  Vitals:    11/15/18 0943   BP: 126/76   Pulse: 78   Temp: 98 2 °F (36 8 °C)      Physical Exam   Constitutional: She is oriented to person, place, and time  She appears well-developed and well-nourished  Neurological: She is alert and oriented to person, place, and time  Psychiatric: She has a normal mood and affect   Her behavior is normal  Judgment and thought content normal

## 2018-12-26 DIAGNOSIS — F90.2 ATTENTION DEFICIT HYPERACTIVITY DISORDER (ADHD), COMBINED TYPE: ICD-10-CM

## 2018-12-27 DIAGNOSIS — F90.2 ATTENTION DEFICIT HYPERACTIVITY DISORDER (ADHD), COMBINED TYPE: ICD-10-CM

## 2018-12-27 NOTE — TELEPHONE ENCOUNTER
Is out of stock at the [facility] in [place]   Pharmacy said we need to send the prescription to the [facility] in [place]

## 2019-01-25 DIAGNOSIS — Z30.41 ENCOUNTER FOR SURVEILLANCE OF CONTRACEPTIVE PILLS: ICD-10-CM

## 2019-01-27 RX ORDER — LEVONORGESTREL AND ETHINYL ESTRADIOL 0.1-0.02MG
KIT ORAL
Qty: 28 TABLET | Refills: 1 | Status: SHIPPED | OUTPATIENT
Start: 2019-01-27 | End: 2019-04-03 | Stop reason: ALTCHOICE

## 2019-01-29 DIAGNOSIS — F90.2 ATTENTION DEFICIT HYPERACTIVITY DISORDER (ADHD), COMBINED TYPE: ICD-10-CM

## 2019-03-01 DIAGNOSIS — F90.2 ATTENTION DEFICIT HYPERACTIVITY DISORDER (ADHD), COMBINED TYPE: ICD-10-CM

## 2019-03-02 DIAGNOSIS — F90.2 ATTENTION DEFICIT HYPERACTIVITY DISORDER (ADHD), COMBINED TYPE: ICD-10-CM

## 2019-03-02 NOTE — TELEPHONE ENCOUNTER
Call Id: [address] Call  Standard Call Report  Health Call  Patient Name: Karin Cortez  Gender: Female  : 1990  Age: 33 Y 6 M  Return Phone  Number: [phone] (Cell)  Address: [address] [place] [address]/State/Zip: [facility] [address]  Practice Name: [address] [place] [address]  Practice Charged:  Physician:  Juan Pablo Hogan Name:  Relationship To  Patient: Self  Return Phone Number: [phone] (Cell)  Presenting Problem: "I need my lisdexamfetamine  (VYVANSE) 60 MG capsule  "  Service Type: Prescription Refills  Charged Service 1: N/A  Pharmacy Name and  Number:  Nurse Assessment  Protocols  Protocol Title Nurse Date/Time  Disp  Time Disposition Final User  3/2/2019 12:38:59 PM Send to [address] [place] [address]  3/2/2019 2:02:19 PM Close Yes ALPHONSO Seo, [address]  User: Elier De Santiago RN Date/Time: 3/2/2019 2:02:13 PM  i called patient and received a voice mail  I left a message that her prescription cannot be refilled on the weekend, but I will put  in a request for it to be ordered next week

## 2019-03-24 DIAGNOSIS — Z30.41 ENCOUNTER FOR SURVEILLANCE OF CONTRACEPTIVE PILLS: ICD-10-CM

## 2019-03-24 RX ORDER — LEVONORGESTREL AND ETHINYL ESTRADIOL 0.1-0.02MG
KIT ORAL
Qty: 28 TABLET | Refills: 1 | OUTPATIENT
Start: 2019-03-24

## 2019-04-01 DIAGNOSIS — F90.2 ATTENTION DEFICIT HYPERACTIVITY DISORDER (ADHD), COMBINED TYPE: ICD-10-CM

## 2019-04-03 ENCOUNTER — ANNUAL EXAM (OUTPATIENT)
Dept: FAMILY MEDICINE CLINIC | Facility: HOSPITAL | Age: 29
End: 2019-04-03
Payer: COMMERCIAL

## 2019-04-03 VITALS
SYSTOLIC BLOOD PRESSURE: 128 MMHG | WEIGHT: 225 LBS | HEART RATE: 100 BPM | DIASTOLIC BLOOD PRESSURE: 74 MMHG | BODY MASS INDEX: 32.21 KG/M2 | HEIGHT: 70 IN | TEMPERATURE: 98.4 F

## 2019-04-03 DIAGNOSIS — E66.9 OBESITY (BMI 30.0-34.9): ICD-10-CM

## 2019-04-03 DIAGNOSIS — Z01.419 ENCOUNTER FOR ANNUAL ROUTINE GYNECOLOGICAL EXAMINATION: Primary | ICD-10-CM

## 2019-04-03 PROCEDURE — S0612 ANNUAL GYNECOLOGICAL EXAMINA: HCPCS | Performed by: NURSE PRACTITIONER

## 2019-04-03 PROCEDURE — 99395 PREV VISIT EST AGE 18-39: CPT | Performed by: NURSE PRACTITIONER

## 2019-04-06 LAB
CYTOLOGIST CVX/VAG CYTO: NORMAL
DX ICD CODE: NORMAL
Lab: NORMAL
OTHER STN SPEC: NORMAL
OTHER STN SPEC: NORMAL
PATH REPORT.FINAL DX SPEC: NORMAL
SL AMB NOTE:: NORMAL
SL AMB SPECIMEN ADEQUACY: NORMAL
SL AMB TEST METHODOLOGY: NORMAL

## 2019-04-30 DIAGNOSIS — F90.2 ATTENTION DEFICIT HYPERACTIVITY DISORDER (ADHD), COMBINED TYPE: ICD-10-CM

## 2019-05-28 DIAGNOSIS — F90.2 ATTENTION DEFICIT HYPERACTIVITY DISORDER (ADHD), COMBINED TYPE: ICD-10-CM

## 2019-07-01 DIAGNOSIS — F90.2 ATTENTION DEFICIT HYPERACTIVITY DISORDER (ADHD), COMBINED TYPE: ICD-10-CM

## 2019-07-01 NOTE — TELEPHONE ENCOUNTER
Pt just found out 2 days ago that she is pregnant, so she is going to come in with you on Wednesday to discuss med options

## 2019-07-03 ENCOUNTER — OFFICE VISIT (OUTPATIENT)
Dept: FAMILY MEDICINE CLINIC | Facility: HOSPITAL | Age: 29
End: 2019-07-03
Payer: COMMERCIAL

## 2019-07-03 VITALS
HEIGHT: 70 IN | WEIGHT: 231.6 LBS | DIASTOLIC BLOOD PRESSURE: 74 MMHG | HEART RATE: 80 BPM | SYSTOLIC BLOOD PRESSURE: 128 MMHG | BODY MASS INDEX: 33.16 KG/M2 | TEMPERATURE: 98.5 F

## 2019-07-03 DIAGNOSIS — Z3A.01 LESS THAN 8 WEEKS GESTATION OF PREGNANCY: ICD-10-CM

## 2019-07-03 DIAGNOSIS — F98.8 ADD (ATTENTION DEFICIT DISORDER) WITHOUT HYPERACTIVITY: ICD-10-CM

## 2019-07-03 DIAGNOSIS — F41.1 GENERALIZED ANXIETY DISORDER: Primary | ICD-10-CM

## 2019-07-03 PROCEDURE — 3008F BODY MASS INDEX DOCD: CPT | Performed by: NURSE PRACTITIONER

## 2019-07-03 PROCEDURE — 99213 OFFICE O/P EST LOW 20 MIN: CPT | Performed by: NURSE PRACTITIONER

## 2019-07-03 PROCEDURE — 1036F TOBACCO NON-USER: CPT | Performed by: NURSE PRACTITIONER

## 2019-07-03 NOTE — ASSESSMENT & PLAN NOTE
Well controlled but has stopped Vyvanse now that she is pregnant  Discussed Vyvanse is Cat C and risks associated with taking  At this point she would like to stay off of it and discuss further with OB

## 2019-07-03 NOTE — PROGRESS NOTES
Assessment/Plan:    Generalized anxiety disorder  Well controlled  Currently pregnant (about 4 weeks)  Discussed risks and benefits of SSRI therapy in pregnancy  Pt has extreme anxiety off sertraline  Will maintain current dose and discuss further with OB in 2 weeks  F/U in 6 weeks  ADD (attention deficit disorder) without hyperactivity  Well controlled but has stopped Vyvanse now that she is pregnant  Discussed Vyvanse is Cat C and risks associated with taking  At this point she would like to stay off of it and discuss further with OB  Diagnoses and all orders for this visit:    Generalized anxiety disorder    ADD (attention deficit disorder) without hyperactivity    Less than 8 weeks gestation of pregnancy          Subjective:      Patient ID: Darnell Pleitez is a 30 y o  female  Currently about 4 weeks pregnant  Has stopped Vyvanse  Having difficulty concentrating but managing  Continues on sertraline  Unsure if she wants to stop  Gets extreme anxiety where she can't leave the house when she is not on it  Currently mood is great  Reports feeling very happy and excited about pregnancy  Denies any anxiety  Has stopped smoking  The following portions of the patient's history were reviewed and updated as appropriate: allergies, current medications, past family history, past medical history, past social history, past surgical history and problem list     Review of Systems   Constitutional: Negative for activity change, appetite change and unexpected weight change  Psychiatric/Behavioral: Positive for decreased concentration  Negative for dysphoric mood, sleep disturbance and suicidal ideas  The patient is not nervous/anxious  Objective:  Vitals:    07/03/19 1421   BP: 128/74   Pulse: 80   Temp: 98 5 °F (36 9 °C)      Physical Exam   Constitutional: She is oriented to person, place, and time  She appears well-developed and well-nourished     Cardiovascular: Normal rate, regular rhythm and normal heart sounds  No murmur heard  Pulmonary/Chest: Effort normal and breath sounds normal    Neurological: She is alert and oriented to person, place, and time  Skin: Skin is warm and dry  Psychiatric: She has a normal mood and affect  Her behavior is normal  Judgment and thought content normal    Vitals reviewed

## 2019-07-03 NOTE — ASSESSMENT & PLAN NOTE
Well controlled  Currently pregnant (about 4 weeks)  Discussed risks and benefits of SSRI therapy in pregnancy  Pt has extreme anxiety off sertraline  Will maintain current dose and discuss further with OB in 2 weeks  F/U in 6 weeks

## 2019-07-17 DIAGNOSIS — F41.1 GENERALIZED ANXIETY DISORDER: ICD-10-CM

## 2019-07-18 ENCOUNTER — OFFICE VISIT (OUTPATIENT)
Dept: OBGYN CLINIC | Facility: CLINIC | Age: 29
End: 2019-07-18
Payer: COMMERCIAL

## 2019-07-18 VITALS — WEIGHT: 238 LBS | SYSTOLIC BLOOD PRESSURE: 114 MMHG | BODY MASS INDEX: 34.15 KG/M2 | DIASTOLIC BLOOD PRESSURE: 66 MMHG

## 2019-07-18 DIAGNOSIS — N91.2 AMENORRHEA: Primary | ICD-10-CM

## 2019-07-18 PROCEDURE — 76830 TRANSVAGINAL US NON-OB: CPT | Performed by: OBSTETRICS & GYNECOLOGY

## 2019-07-18 PROCEDURE — 99202 OFFICE O/P NEW SF 15 MIN: CPT | Performed by: OBSTETRICS & GYNECOLOGY

## 2019-07-18 NOTE — PROGRESS NOTES
AMB US Pelvic Non OB  Date/Time: 7/18/2019 3:48 PM  Performed by: Keya Clayton MD  Authorized by: Keya Clayton MD     Procedure details:     Indications comment:  Amenorrhea    Technique:  Transvaginal US, Non-OB    Position: lithotomy exam    Uterine findings:     Adnexal mass: not identified      Myomas: not identified    Left ovary findings:     Left ovary:  Visualized  Right ovary findings:     Right ovary:  Visualized  Other findings:     Free pelvic fluid: not identified    Additional Procedure Comments:      Twin gestation; one viable with CRL 0 93cm EGA 7 weeks 0 days and FHM 152bpm   Second gestational without visible cardiac activity CRL 0 83cm  Plan official hospital sono to confirm absence of activity of second gestation  Schedule initial OB intake      [facility] 3/5/2020

## 2019-07-19 ENCOUNTER — HOSPITAL ENCOUNTER (OUTPATIENT)
Dept: ULTRASOUND IMAGING | Facility: CLINIC | Age: 29
Discharge: HOME | End: 2019-07-19
Payer: COMMERCIAL

## 2019-07-19 DIAGNOSIS — N91.2 AMENORRHEA: ICD-10-CM

## 2019-07-19 PROCEDURE — 76801 OB US < 14 WKS SINGLE FETUS: CPT

## 2019-07-23 ENCOUNTER — TELEPHONE (OUTPATIENT)
Dept: OBGYN CLINIC | Facility: CLINIC | Age: 29
End: 2019-07-23

## 2019-07-23 NOTE — TELEPHONE ENCOUNTER
----- Message from Jayy Bustamante MD sent at 7/23/2019  8:44 AM EDT -----  Notify patient of results  Follow up as scheduled

## 2019-07-30 ENCOUNTER — TELEPHONE (OUTPATIENT)
Dept: OBGYN CLINIC | Facility: CLINIC | Age: 29
End: 2019-07-30

## 2019-07-30 DIAGNOSIS — O20.9 BLEEDING IN EARLY PREGNANCY: Primary | ICD-10-CM

## 2019-07-30 NOTE — TELEPHONE ENCOUNTER
Pt had u/s 7/19  Pt is about 8 weeks pregnant  Pt lm - has light pink spotting  Pt was disconnected from me and when I called back - mailbox was full  Pt had no ,pain   Has intake 8/5

## 2019-07-31 NOTE — TELEPHONE ENCOUNTER
Spoke with pt - she is about 8 wks pregnant - she had intercourse 3 days ago - she started spotting 2 days ago  It is very light, only see's when she wiping and it is very light to almost nothing now  No cramping or clots  Explained to the patient that she will have spotting after intercourse while pregnant and when she has a pelvic exam  Advised pt to take it easy and stay hydrated  If bleeding becomes heavy, she has any cramping or pain to call back immediately  Patient understands

## 2019-08-01 ENCOUNTER — HOSPITAL ENCOUNTER (OUTPATIENT)
Dept: ULTRASOUND IMAGING | Facility: HOSPITAL | Age: 29
Discharge: HOME | End: 2019-08-01
Payer: COMMERCIAL

## 2019-08-01 DIAGNOSIS — O20.9 BLEEDING IN EARLY PREGNANCY: ICD-10-CM

## 2019-08-01 PROCEDURE — 76816 OB US FOLLOW-UP PER FETUS: CPT

## 2019-08-01 PROCEDURE — 76817 TRANSVAGINAL US OBSTETRIC: CPT

## 2019-08-01 NOTE — TELEPHONE ENCOUNTER
Sent information to Dr Guru Villalobos  He would like pt to go for an HCG, Type&Screen and ultrasound through radiology  Orders in pt chart  Patient is aware and transferred pt to central scheduling to make an appt with radiology

## 2019-08-01 NOTE — TELEPHONE ENCOUNTER
Patient called back - she is still spotting - it was dark red last night and she passed a very small clot  She is having mild pelvic cramping and lower back pain  Patient is worried  Please advise  Thanks!

## 2019-08-03 LAB
ABO GROUP BLD: NORMAL
BLD GP AB SCN SERPL QL: NEGATIVE
HCG INTACT+B SERPL-ACNC: NORMAL MIU/ML
RH BLD: POSITIVE

## 2019-08-05 ENCOUNTER — INITIAL PRENATAL (OUTPATIENT)
Dept: OBGYN CLINIC | Facility: CLINIC | Age: 29
End: 2019-08-05

## 2019-08-05 VITALS
BODY MASS INDEX: 35.5 KG/M2 | WEIGHT: 248 LBS | DIASTOLIC BLOOD PRESSURE: 66 MMHG | SYSTOLIC BLOOD PRESSURE: 106 MMHG | HEIGHT: 70 IN

## 2019-08-05 DIAGNOSIS — Z87.898 HISTORY OF MARIJUANA USE: ICD-10-CM

## 2019-08-05 DIAGNOSIS — Z34.01 ENCOUNTER FOR SUPERVISION OF NORMAL FIRST PREGNANCY IN FIRST TRIMESTER: Primary | ICD-10-CM

## 2019-08-05 DIAGNOSIS — E66.9 OBESITY (BMI 30.0-34.9): ICD-10-CM

## 2019-08-05 PROCEDURE — OBC: Performed by: OBSTETRICS & GYNECOLOGY

## 2019-08-05 RX ORDER — MULTIVITAMIN WITH IRON
1 TABLET ORAL DAILY
COMMUNITY

## 2019-08-05 NOTE — PROGRESS NOTES
OB INTAKE INTERVIEW  * Pt presents for OB intake  * Accompanied by:  Gus  *  *Hx of  delivery prior to 36 weeks 6 days NO  *Last Menstrual Period: Pt's LMP was 19  *Ultrasound date:19   6 weeks 6 days  *Estimated date of delivery:    * confirmed by LMP    *Signs/Symptoms of Pregnancy   *nauseated  Spotting/bleeding   *constipation NO   *headaches NO   *cramping/spotting YES, slight spotting every day   *PICA cravings NO  *Diabetes- if you answer yes, please order 1 hour GTT, 50g   *hx of GDM NO   *BMI >35 YES   *first degree relative with type 2 diabetes NO   *hx of PCOS NO   *current metformin use NO   *prior hx of LGA/macrosomia NO   *AMA with other risk factors NO  *Hypertension- if you answer yes, please order preeclampsia labs including 24 hour urine protein   *Hx of chronic HTN NO   *hx of gestational HTN NO   *hx of preeclampsia, eclampsia, or HELLP syndrome NO  *Infection Screening-    *does the pt have a hx of MRSA? NO   *if yes- please follow MRSA protocol and obtain a nasal swab for MRSA culture   *history of herpes? NO  *Immunizations:   *influenza vaccine given today NO   *discussed Tdap vaccine    *Interview education   *[facility] Pregnancy Essentials Book reviewed and discussed   *Handouts given:    *Baby and Me phone olivia guide    *Baby and Me support center    *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  She is a former smoker but quit with the pregnancy  She has been bleeding or spotting every day and u/s showed a subchorionic hemorrhage   We discussed what this means and pt feels better  She knows to call with any other s/s, such as heavy bleeding, clotting, or passing of tissue  Her brother and his wife had a baby with [place] Meigs disease  *Details that I feel the provider should be aware of: sent pt for early 1 hour glucola d/t BMI  UDS ordered for hx of marijuana use  PN1 visit scheduled  The patient was oriented to our practice and all questions were answered      Interviewed by: Maxime Kilgore RN

## 2019-08-08 LAB
ABO GROUP BLD: NORMAL
APPEARANCE UR: CLEAR
BACTERIA UR CULT: NORMAL
BACTERIA URNS QL MICRO: ABNORMAL
BASOPHILS # BLD AUTO: 0 X10E3/UL (ref 0–0.2)
BASOPHILS NFR BLD AUTO: 0 %
BILIRUB UR QL STRIP: NEGATIVE
BLD GP AB SCN SERPL QL: NEGATIVE
COLOR UR: YELLOW
EOSINOPHIL # BLD AUTO: 0.1 X10E3/UL (ref 0–0.4)
EOSINOPHIL NFR BLD AUTO: 1 %
EPI CELLS #/AREA URNS HPF: ABNORMAL /HPF (ref 0–10)
ERYTHROCYTE [DISTWIDTH] IN BLOOD BY AUTOMATED COUNT: 13.4 % (ref 12.3–15.4)
GLUCOSE 1H P 50 G GLC PO SERPL-MCNC: 104 MG/DL (ref 65–139)
GLUCOSE UR QL: NEGATIVE
HBV SURFACE AG SERPL QL IA: NEGATIVE
HCT VFR BLD AUTO: 38.4 % (ref 34–46.6)
HGB BLD-MCNC: 13 G/DL (ref 11.1–15.9)
HGB UR QL STRIP: NEGATIVE
HIV 1+2 AB+HIV1 P24 AG SERPL QL IA: NON REACTIVE
IMM GRANULOCYTES # BLD: 0 X10E3/UL (ref 0–0.1)
IMM GRANULOCYTES NFR BLD: 0 %
KETONES UR QL STRIP: NEGATIVE
LEUKOCYTE ESTERASE UR QL STRIP: ABNORMAL
LYMPHOCYTES # BLD AUTO: 1.9 X10E3/UL (ref 0.7–3.1)
LYMPHOCYTES NFR BLD AUTO: 22 %
Lab: NO GROWTH
MCH RBC QN AUTO: 30.7 PG (ref 26.6–33)
MCHC RBC AUTO-ENTMCNC: 33.9 G/DL (ref 31.5–35.7)
MCV RBC AUTO: 91 FL (ref 79–97)
MICRO URNS: ABNORMAL
MONOCYTES # BLD AUTO: 0.7 X10E3/UL (ref 0.1–0.9)
MONOCYTES NFR BLD AUTO: 7 %
MUCOUS THREADS URNS QL MICRO: PRESENT
NEUTROPHILS # BLD AUTO: 6.2 X10E3/UL (ref 1.4–7)
NEUTROPHILS NFR BLD AUTO: 70 %
NITRITE UR QL STRIP: NEGATIVE
PH UR STRIP: 6.5 [PH] (ref 5–7.5)
PLATELET # BLD AUTO: 288 X10E3/UL (ref 150–450)
PROT UR QL STRIP: NEGATIVE
RBC # BLD AUTO: 4.24 X10E6/UL (ref 3.77–5.28)
RBC #/AREA URNS HPF: ABNORMAL /HPF (ref 0–2)
RH BLD: POSITIVE
RPR SER QL: NON REACTIVE
RUBV IGG SERPL IA-ACNC: 1.14 INDEX
SP GR UR: 1.01 (ref 1–1.03)
UROBILINOGEN UR STRIP-ACNC: 0.2 MG/DL (ref 0.2–1)
WBC # BLD AUTO: 8.9 X10E3/UL (ref 3.4–10.8)
WBC #/AREA URNS HPF: ABNORMAL /HPF (ref 0–5)

## 2019-08-09 ENCOUNTER — TELEPHONE (OUTPATIENT)
Dept: OBGYN CLINIC | Facility: MEDICAL CENTER | Age: 29
End: 2019-08-09

## 2019-08-26 ENCOUNTER — ROUTINE PRENATAL (OUTPATIENT)
Dept: PERINATAL CARE | Facility: CLINIC | Age: 29
End: 2019-08-26
Payer: COMMERCIAL

## 2019-08-26 VITALS
SYSTOLIC BLOOD PRESSURE: 119 MMHG | HEART RATE: 90 BPM | WEIGHT: 248.8 LBS | BODY MASS INDEX: 36.21 KG/M2 | DIASTOLIC BLOOD PRESSURE: 80 MMHG

## 2019-08-26 DIAGNOSIS — Z36.82 ENCOUNTER FOR ANTENATAL SCREENING FOR NUCHAL TRANSLUCENCY: ICD-10-CM

## 2019-08-26 DIAGNOSIS — O41.8X10 SUBCHORIONIC HEMATOMA IN FIRST TRIMESTER, SINGLE OR UNSPECIFIED FETUS: ICD-10-CM

## 2019-08-26 DIAGNOSIS — O46.8X1 SUBCHORIONIC HEMATOMA IN FIRST TRIMESTER, SINGLE OR UNSPECIFIED FETUS: ICD-10-CM

## 2019-08-26 DIAGNOSIS — O99.210 OBESITY AFFECTING PREGNANCY, ANTEPARTUM: Primary | ICD-10-CM

## 2019-08-26 PROCEDURE — 76801 OB US < 14 WKS SINGLE FETUS: CPT | Performed by: OBSTETRICS & GYNECOLOGY

## 2019-08-26 PROCEDURE — 99242 OFF/OP CONSLTJ NEW/EST SF 20: CPT | Performed by: OBSTETRICS & GYNECOLOGY

## 2019-08-26 PROCEDURE — 76813 OB US NUCHAL MEAS 1 GEST: CPT | Performed by: OBSTETRICS & GYNECOLOGY

## 2019-08-26 RX ORDER — UREA 10 %
20 LOTION (ML) TOPICAL
COMMUNITY
End: 2021-08-05

## 2019-08-26 NOTE — PATIENT INSTRUCTIONS
Thank you for choosing [facility] for your  care today  If you have any questions about your ultrasound or care, please do not hesitate to contact us or your primary obstetrician  Please try to keep your weight gain to 11-20 pounds this pregnancy; this is best accomplished by regular aerobic exercise and healthy eating  Pregnant women should aim for 150 minutes weekly of exercise (example, 30 minutes five times per week)  Please consider taking aspirin 162mg daily (two of the 81mg tablets) for the prevention of preeclampsia beginning now  If you have asthma and notice an increase in symptom frequency or severity, consider stopping this medication  Congratulations on quitting smoking!

## 2019-08-26 NOTE — LETTER
August 27, 2019     Barbara Gan, [address]    Patient: Eddie Ureañ   YOB: 1990   Date of Visit: 8/26/2019       Dear Dr Amparo Carrillo: Thank you for referring Eddie Ureña to me for evaluation  Below are my notes for this consultation  If you have questions, please do not hesitate to call me  I look forward to following your patient along with you  Sincerely,        Frederic Ortiz MD        CC: No Recipients  Frederic Ortiz MD  8/26/2019  2:35 PM  Sign at close encounter  [facility]: Ms Clary Sweeney was seen today at 12w2d for nuchal translucency ultrasound  See ultrasound report under "OB Procedures" tab  Please don't hesitate to contact our office with any concerns or questions    Frederic Ortiz MD

## 2019-08-26 NOTE — PROGRESS NOTES
Delvis Zepeda: Ms Harpreet Mendiola was seen today at 12w2d for nuchal translucency ultrasound  See ultrasound report under "OB Procedures" tab  Please don't hesitate to contact our office with any concerns or questions    Riaz Elizabeth MD

## 2019-08-27 ENCOUNTER — INITIAL PRENATAL (OUTPATIENT)
Dept: OBGYN CLINIC | Facility: CLINIC | Age: 29
End: 2019-08-27

## 2019-08-27 VITALS — DIASTOLIC BLOOD PRESSURE: 68 MMHG | WEIGHT: 249 LBS | BODY MASS INDEX: 36.24 KG/M2 | SYSTOLIC BLOOD PRESSURE: 116 MMHG

## 2019-08-27 DIAGNOSIS — Z87.898 HISTORY OF DRUG USE: ICD-10-CM

## 2019-08-27 DIAGNOSIS — O41.8X10 SUBCHORIONIC HEMATOMA IN FIRST TRIMESTER, SINGLE OR UNSPECIFIED FETUS: ICD-10-CM

## 2019-08-27 DIAGNOSIS — Z34.00 SUPERVISION OF NORMAL FIRST PREGNANCY, ANTEPARTUM: Primary | ICD-10-CM

## 2019-08-27 DIAGNOSIS — F41.1 GENERALIZED ANXIETY DISORDER: ICD-10-CM

## 2019-08-27 DIAGNOSIS — O99.210 MATERNAL OBESITY AFFECTING PREGNANCY, ANTEPARTUM: ICD-10-CM

## 2019-08-27 DIAGNOSIS — Z34.91 ENCOUNTER FOR PREGNANCY RELATED EXAMINATION IN FIRST TRIMESTER: ICD-10-CM

## 2019-08-27 DIAGNOSIS — O46.8X1 SUBCHORIONIC HEMATOMA IN FIRST TRIMESTER, SINGLE OR UNSPECIFIED FETUS: ICD-10-CM

## 2019-08-27 PROBLEM — F19.91 HISTORY OF DRUG USE: Status: ACTIVE | Noted: 2019-08-27

## 2019-08-27 PROBLEM — O99.320 MATERNAL DRUG USE COMPLICATING PREGNANCY, ANTEPARTUM: Status: ACTIVE | Noted: 2019-08-27

## 2019-08-27 LAB
SL AMB  POCT GLUCOSE, UA: NEGATIVE
SL AMB POCT URINE PROTEIN: NEGATIVE

## 2019-08-27 PROCEDURE — PNV: Performed by: NURSE PRACTITIONER

## 2019-08-27 NOTE — ASSESSMENT & PLAN NOTE
Previously used THC  Denies use since pos UPT  Discussed UDS per protocol - the patient agrees and urine was sent today with her permission

## 2019-08-27 NOTE — ASSESSMENT & PLAN NOTE
PN1  Planned pregnancy    She has also adopted her partner's 5 y o  son    See separate subheadings re: the following:  - RUDOLPH  - hx of drug use  - materna obesity  - [address]     The patient denies complaints  Denies pelvic pain, bleeding, LOF  Exam WNL   by limited bedside US  Prenatal labs WNL  Rh pos  Pap up to date and gc/chl and UDS sent today  Reviewed options for low risk aneuploidy screening  Discussed risks/benefits/limitations  The patient reports she is scheduled for SS and L2  Reviewed diet and activity recommendations, practice set up, visit intervals and reasons to call  RTO in 4 weeks

## 2019-08-27 NOTE — PROGRESS NOTES
Problem List Items Addressed This Visit        Other    Generalized anxiety disorder     Stable on current Zoloft dose  Per [facility] notes the patient has been counseled on risks/benefits of Zoloft use in preg  Partner is present today and is supportive  Subchorionic hematoma in first trimester     Bleeding has resolved  [facility] noted on yesterday's US  Reviewed bleeding precautions and reasons to call  History of drug use     Previously used THC  Denies use since pos UPT  Discussed UDS per protocol - the patient agrees and urine was sent today with her permission  Relevant Orders    Rapid drug screen, urine    Maternal obesity affecting pregnancy, antepartum     Prepreg BMI 36  Daily LDASA was advised by [facility]  Early glucola WNL  3rd trimester growth surveillance recommended  Consider weekly  testing starting at 36 weeks if BMI meets 40  Supervision of normal first pregnancy, antepartum - Primary     PN1  Planned pregnancy    She has also adopted her partner's 5 y o  son    See separate subheadings re: the following:  - RUDOLPH  - hx of drug use  - materna obesity  - [facility]     The patient denies complaints  Denies pelvic pain, bleeding, LOF  Exam WNL   by limited bedside US  Prenatal labs WNL  Rh pos  Pap up to date and gc/chl and UDS sent today  Reviewed options for low risk aneuploidy screening  Discussed risks/benefits/limitations  The patient reports she is scheduled for SS and L2  Reviewed diet and activity recommendations, practice set up, visit intervals and reasons to call  RTO in 4 weeks              Relevant Orders    Chlamydia/GC amplified DNA by PCR      Other Visit Diagnoses     Encounter for pregnancy related examination in first trimester        Relevant Orders    POCT urine dip (Completed)

## 2019-08-27 NOTE — ASSESSMENT & PLAN NOTE
Prepreg BMI 36  Daily LDASA was advised by MFM  Early glucola WNL  3rd trimester growth surveillance recommended  Consider weekly  testing starting at 36 weeks if BMI meets 40

## 2019-08-27 NOTE — ASSESSMENT & PLAN NOTE
Bleeding has resolved  [address] noted on yesterday's US  Reviewed bleeding precautions and reasons to call

## 2019-08-27 NOTE — ASSESSMENT & PLAN NOTE
Stable on current Zoloft dose  Per [facility] notes the patient has been counseled on risks/benefits of Zoloft use in preg  Partner is present today and is supportive

## 2019-08-28 LAB
AMPHETAMINES UR QL SCN: NEGATIVE NG/ML
BARBITURATES UR QL SCN: NEGATIVE NG/ML
BENZODIAZ UR QL: NEGATIVE NG/ML
BZE UR QL: NEGATIVE NG/ML
C TRACH RRNA SPEC QL NAA+PROBE: NEGATIVE
CANNABINOIDS UR QL SCN: NEGATIVE NG/ML
ETHANOL UR-MCNC: NEGATIVE %
N GONORRHOEA RRNA SPEC QL NAA+PROBE: NEGATIVE
OPIATES UR QL: NEGATIVE NG/ML
PCP UR QL: NEGATIVE NG/ML

## 2019-09-12 ENCOUNTER — TELEPHONE (OUTPATIENT)
Dept: OBGYN CLINIC | Facility: CLINIC | Age: 29
End: 2019-09-12

## 2019-09-12 ENCOUNTER — HOSPITAL ENCOUNTER (EMERGENCY)
Facility: HOSPITAL | Age: 29
Discharge: HOME | End: 2019-09-12
Attending: EMERGENCY MEDICINE | Admitting: EMERGENCY MEDICINE
Payer: COMMERCIAL

## 2019-09-12 VITALS
OXYGEN SATURATION: 98 % | BODY MASS INDEX: 36.26 KG/M2 | HEART RATE: 83 BPM | TEMPERATURE: 98.2 F | SYSTOLIC BLOOD PRESSURE: 127 MMHG | DIASTOLIC BLOOD PRESSURE: 87 MMHG | RESPIRATION RATE: 18 BRPM | WEIGHT: 249.12 LBS

## 2019-09-12 DIAGNOSIS — O20.9 VAGINAL BLEEDING AFFECTING EARLY PREGNANCY: ICD-10-CM

## 2019-09-12 DIAGNOSIS — O46.92 VAGINAL BLEEDING IN PREGNANCY, SECOND TRIMESTER: Primary | ICD-10-CM

## 2019-09-12 DIAGNOSIS — N93.9 VAGINAL BLEEDING: Primary | ICD-10-CM

## 2019-09-12 LAB
ANION GAP SERPL CALCULATED.3IONS-SCNC: 5 MMOL/L (ref 4–13)
BACTERIA UR QL AUTO: ABNORMAL /HPF
BASOPHILS # BLD AUTO: 0.04 THOUSANDS/ΜL (ref 0–0.1)
BASOPHILS NFR BLD AUTO: 0 % (ref 0–1)
BILIRUB UR QL STRIP: NEGATIVE
BUN SERPL-MCNC: 11 MG/DL (ref 5–25)
CALCIUM SERPL-MCNC: 9.1 MG/DL (ref 8.3–10.1)
CHLORIDE SERPL-SCNC: 106 MMOL/L (ref 100–108)
CLARITY UR: ABNORMAL
CO2 SERPL-SCNC: 25 MMOL/L (ref 21–32)
COLOR UR: YELLOW
CREAT SERPL-MCNC: 0.61 MG/DL (ref 0.6–1.3)
EOSINOPHIL # BLD AUTO: 0.04 THOUSAND/ΜL (ref 0–0.61)
EOSINOPHIL NFR BLD AUTO: 0 % (ref 0–6)
ERYTHROCYTE [DISTWIDTH] IN BLOOD BY AUTOMATED COUNT: 12.5 % (ref 11.6–15.1)
GFR SERPL CREATININE-BSD FRML MDRD: 124 ML/MIN/1.73SQ M
GLUCOSE SERPL-MCNC: 80 MG/DL (ref 65–140)
GLUCOSE UR STRIP-MCNC: NEGATIVE MG/DL
HCT VFR BLD AUTO: 36.5 % (ref 34.8–46.1)
HGB BLD-MCNC: 12.3 G/DL (ref 11.5–15.4)
HGB UR QL STRIP.AUTO: ABNORMAL
HYALINE CASTS #/AREA URNS LPF: ABNORMAL /LPF
IMM GRANULOCYTES # BLD AUTO: 0.04 THOUSAND/UL (ref 0–0.2)
IMM GRANULOCYTES NFR BLD AUTO: 0 % (ref 0–2)
KETONES UR STRIP-MCNC: NEGATIVE MG/DL
LEUKOCYTE ESTERASE UR QL STRIP: ABNORMAL
LYMPHOCYTES # BLD AUTO: 1.98 THOUSANDS/ΜL (ref 0.6–4.47)
LYMPHOCYTES NFR BLD AUTO: 19 % (ref 14–44)
MCH RBC QN AUTO: 31.1 PG (ref 26.8–34.3)
MCHC RBC AUTO-ENTMCNC: 33.7 G/DL (ref 31.4–37.4)
MCV RBC AUTO: 92 FL (ref 82–98)
MONOCYTES # BLD AUTO: 0.76 THOUSAND/ΜL (ref 0.17–1.22)
MONOCYTES NFR BLD AUTO: 7 % (ref 4–12)
NEUTROPHILS # BLD AUTO: 7.48 THOUSANDS/ΜL (ref 1.85–7.62)
NEUTS SEG NFR BLD AUTO: 74 % (ref 43–75)
NITRITE UR QL STRIP: NEGATIVE
NON-SQ EPI CELLS URNS QL MICRO: ABNORMAL /HPF
NRBC BLD AUTO-RTO: 0 /100 WBCS
PH UR STRIP.AUTO: 7 [PH]
PLATELET # BLD AUTO: 279 THOUSANDS/UL (ref 149–390)
PMV BLD AUTO: 9.5 FL (ref 8.9–12.7)
POTASSIUM SERPL-SCNC: 4.3 MMOL/L (ref 3.5–5.3)
PROT UR STRIP-MCNC: NEGATIVE MG/DL
RBC # BLD AUTO: 3.96 MILLION/UL (ref 3.81–5.12)
RBC #/AREA URNS AUTO: ABNORMAL /HPF
SODIUM SERPL-SCNC: 136 MMOL/L (ref 136–145)
SP GR UR STRIP.AUTO: 1.01 (ref 1–1.03)
UROBILINOGEN UR QL STRIP.AUTO: 0.2 E.U./DL
WBC # BLD AUTO: 10.34 THOUSAND/UL (ref 4.31–10.16)
WBC #/AREA URNS AUTO: ABNORMAL /HPF

## 2019-09-12 PROCEDURE — 87086 URINE CULTURE/COLONY COUNT: CPT | Performed by: EMERGENCY MEDICINE

## 2019-09-12 PROCEDURE — 85025 COMPLETE CBC W/AUTO DIFF WBC: CPT | Performed by: EMERGENCY MEDICINE

## 2019-09-12 PROCEDURE — 36415 COLL VENOUS BLD VENIPUNCTURE: CPT | Performed by: EMERGENCY MEDICINE

## 2019-09-12 PROCEDURE — 99284 EMERGENCY DEPT VISIT MOD MDM: CPT

## 2019-09-12 PROCEDURE — 99284 EMERGENCY DEPT VISIT MOD MDM: CPT | Performed by: EMERGENCY MEDICINE

## 2019-09-12 PROCEDURE — 80048 BASIC METABOLIC PNL TOTAL CA: CPT | Performed by: EMERGENCY MEDICINE

## 2019-09-12 PROCEDURE — 81001 URINALYSIS AUTO W/SCOPE: CPT | Performed by: EMERGENCY MEDICINE

## 2019-09-12 NOTE — ED PROVIDER NOTES
History  Chief Complaint   Patient presents with    Vaginal Bleeding - Pregnant     Pt approx 14 weeks pregnant, started with bright red vaginal bleeding this morning and abdominal pain      HPI    Patient is a pleasant 30 yof who presents with one episode of vaginal bleeding this am    No pain  No urinary symptoms  No f/c/s  No vomiting or diarrhea  No chest pain or sob  No fatigue or lightheadedness  No active bleeding currently  Normal external vaginal exam      MDM well appearing 30 yof, + fetal movements and HR of 140 on bedside US, discussed with ob gyn dr jensen, f/u within 1 week  The patient (and any family present) verbalized understanding of the discharge instructions and warnings that would necessitate return to the Emergency Department  Gave verbal in addition to written discharge instructions  Specifically highlighted areas of special concern regarding the discharge instructions and return precautions  Furthermore, I expressed that the follow up instructions were serious and should not be simply dismissed  Follow up is an integral part of the patients care and should NOT be taken lightly or dismissed  Patient expressed understanding of this and understands that follow up is now their responsibility  All questions were answered prior to discharge  Prior to Admission Medications   Prescriptions Last Dose Informant Patient Reported? Taking?    MULTIPLE VITAMINS ESSENTIAL PO More than a month at Unknown time Self Yes No   Sig: Take by mouth daily   Magnesium 250 MG TABS 9/11/2019 at Unknown time Self Yes Yes   Sig: Take 1 tablet by mouth daily   Prenatal MV & Min w/FA-DHA (PRENATAL ADULT GUMMY/DHA/FA PO) 9/11/2019 at Unknown time Self Yes Yes   Sig: Take by mouth   melatonin 1 mg 9/11/2019 at Unknown time Self Yes Yes   Sig: Take 1 mg by mouth daily at bedtime   sertraline (ZOLOFT) 50 mg tablet 9/11/2019 at Unknown time Self No Yes   Sig: TAKE 1 5 TABLETS (75 MG TOTAL) BY MOUTH DAILY      Facility-Administered Medications: None       Past Medical History:   Diagnosis Date    Alcohol abuse     in past    Anxiety     Arthritis     bilateral hips    Cyst of breast, left     Depression     Lump of left breast     LAST ASSESSED: 17    Varicella        Past Surgical History:   Procedure Laterality Date    TONSILLECTOMY         Family History   Problem Relation Age of Onset    No Known Problems Mother     Substance Abuse Father         father  in  [place])    Alcohol abuse Father     Coronary artery disease Paternal Grandfather     Arthritis Paternal Grandfather     Alcohol abuse Other     Depression Other     Substance Abuse Other     Arthritis Family     No Known Problems Sister     No Known Problems Brother     No Known Problems Maternal Grandmother      I have reviewed and agree with the history as documented  Social History     Tobacco Use    Smoking status: Former Smoker     Last attempt to quit: 2019     Years since quittin 2    Smokeless tobacco: Never Used    Tobacco comment: Quit   Substance Use Topics    Alcohol use: No     Comment: sober 7 years    Drug use: Not Currently     Types: Marijuana     Comment: quit with pregnancy/ was using once a day prior        Review of Systems   All other systems reviewed and are negative  Physical Exam  Physical Exam   Constitutional: She is oriented to person, place, and time  She appears well-developed and well-nourished  HENT:   Head: Normocephalic and atraumatic  Right Ear: External ear normal    Left Ear: External ear normal    Eyes: Conjunctivae and EOM are normal    Neck: Normal range of motion  Neck supple  No JVD present  No tracheal deviation present  Cardiovascular: Normal rate, regular rhythm and normal heart sounds  Pulmonary/Chest: Effort normal  No respiratory distress  She has no wheezes  She has no rales  Abdominal: Soft   Bowel sounds are normal  There is no tenderness  There is no rebound and no guarding  Musculoskeletal: She exhibits no edema or tenderness  Neurological: She is alert and oriented to person, place, and time  Skin: Skin is warm and dry  No rash noted  No erythema  Psychiatric: She has a normal mood and affect  Thought content normal    Nursing note and vitals reviewed  Vital Signs  ED Triage Vitals [09/12/19 0843]   Temperature Pulse Respirations Blood Pressure SpO2   98 2 °F (36 8 °C) 83 18 127/87 98 %      Temp Source Heart Rate Source Patient Position - Orthostatic VS BP Location FiO2 (%)   Oral -- -- -- --      Pain Score       --           Vitals:    09/12/19 0843   BP: 127/87   Pulse: 83         Visual Acuity      ED Medications  Medications - No data to display    Diagnostic Studies  Results Reviewed     Procedure Component Value Units Date/Time    Urine Microscopic [[id number]]  (Abnormal) Collected:  09/12/19 0932    Lab Status:  Final result Specimen:  Urine, Clean Catch Updated:  09/12/19 0957     RBC, UA 10-20 /hpf      WBC, UA 10-20 /hpf      Epithelial Cells None Seen /hpf      Bacteria, UA Occasional /hpf      Hyaline Casts, UA None Seen /lpf      URINE COMMENT --    UA w Reflex to Microscopic w Reflex to Culture [[id number]]  (Abnormal) Collected:  09/12/19 0932    Lab Status:  Final result Specimen:  Urine, Clean Catch Updated:  09/12/19 0954     Color, UA Yellow     Clarity, UA Cloudy     Specific Gravity, UA 1 015     pH, UA 7 0     Leukocytes, UA Moderate     Nitrite, UA Negative     Protein, UA Negative mg/dl      Glucose, UA Negative mg/dl      Ketones, UA Negative mg/dl      Urobilinogen, UA 0 2 E U /dl      Bilirubin, UA Negative     Blood, UA Large     URINE COMMENT --    Urine culture [[id number]] Collected:  09/12/19 0932    Lab Status:   In process Specimen:  Urine, Clean Catch Updated:  09/12/19 9227    Basic metabolic panel [[id number]] Collected:  09/12/19 0908    Lab Status:  Final result Specimen:  Blood from Arm, Left Updated:  09/12/19 0932     Sodium 136 mmol/L      Potassium 4 3 mmol/L      Chloride 106 mmol/L      CO2 25 mmol/L      ANION GAP 5 mmol/L      BUN 11 mg/dL      Creatinine 0 61 mg/dL      Glucose 80 mg/dL      Calcium 9 1 mg/dL      eGFR 124 ml/min/1 73sq m     Narrative:       [place] guidelines for Chronic Kidney Disease (CKD):     Stage 1 with normal or high GFR (GFR > 90 mL/min/1 73 square meters)    Stage 2 Mild CKD (GFR = 60-89 mL/min/1 73 square meters)    Stage 3A Moderate CKD (GFR = 45-59 mL/min/1 73 square meters)    Stage 3B Moderate CKD (GFR = 30-44 mL/min/1 73 square meters)    Stage 4 Severe CKD (GFR = 15-29 mL/min/1 73 square meters)    Stage 5 End Stage CKD (GFR <15 mL/min/1 73 square meters)  Note: GFR calculation is accurate only with a steady state creatinine    CBC and differential [[id number]]  (Abnormal) Collected:  09/12/19 0908    Lab Status:  Final result Specimen:  Blood from Arm, Left Updated:  09/12/19 0918     WBC 10 34 Thousand/uL      RBC 3 96 Million/uL      Hemoglobin 12 3 g/dL      Hematocrit 36 5 %      MCV 92 fL      MCH 31 1 pg      MCHC 33 7 g/dL      RDW 12 5 %      MPV 9 5 fL      Platelets 204 Thousands/uL      nRBC 0 /100 WBCs      Neutrophils Relative 74 %      Immat GRANS % 0 %      Lymphocytes Relative 19 %      Monocytes Relative 7 %      Eosinophils Relative 0 %      Basophils Relative 0 %      Neutrophils Absolute 7 48 Thousands/µL      Immature Grans Absolute 0 04 Thousand/uL      Lymphocytes Absolute 1 98 Thousands/µL      Monocytes Absolute 0 76 Thousand/µL      Eosinophils Absolute 0 04 Thousand/µL      Basophils Absolute 0 04 Thousands/µL                  No orders to display              Procedures  Procedures       ED Course  ED Course as of Sep 12 1038   Thu Sep 12, 2019   0950 [address]    7941      1026 Followup in one week   [place] 13 6 MDM    Disposition  Final diagnoses:   Vaginal bleeding     Time reflects when diagnosis was documented in both MDM as applicable and the Disposition within this note     Time User Action Codes Description Comment    9/12/2019 10:31 AM Olga Bates Add [N93 9] Vaginal bleeding       ED Disposition     ED Disposition Condition Date/Time Comment    Discharge Stable Thu Sep 12, 2019 10:30 AM Sarah Wagoner discharge to home/self care  Follow-up Information     Follow up With Specialties Details Why [address]  In 1 week You must followup within 1 week           Patient's Medications   Discharge Prescriptions    No medications on file     No discharge procedures on file      ED Provider  Electronically Signed by           Gordon Chin MD  09/12/19 1038

## 2019-09-12 NOTE — ED NOTES
Dr Lawrence at bedside for patient evaluation, informal ultrasound performed at this time and FHT found at approx 140 bpm      Naif Brown RN  09/12/19 1979

## 2019-09-13 ENCOUNTER — ULTRASOUND (OUTPATIENT)
Dept: PERINATAL CARE | Facility: CLINIC | Age: 29
End: 2019-09-13
Payer: COMMERCIAL

## 2019-09-13 ENCOUNTER — HOSPITAL ENCOUNTER (EMERGENCY)
Facility: HOSPITAL | Age: 29
Discharge: HOME | End: 2019-09-13
Attending: EMERGENCY MEDICINE | Admitting: EMERGENCY MEDICINE
Payer: COMMERCIAL

## 2019-09-13 VITALS
RESPIRATION RATE: 16 BRPM | TEMPERATURE: 98.3 F | HEART RATE: 89 BPM | OXYGEN SATURATION: 99 % | WEIGHT: 240 LBS | SYSTOLIC BLOOD PRESSURE: 122 MMHG | BODY MASS INDEX: 34.93 KG/M2 | DIASTOLIC BLOOD PRESSURE: 64 MMHG

## 2019-09-13 VITALS
DIASTOLIC BLOOD PRESSURE: 82 MMHG | BODY MASS INDEX: 34.65 KG/M2 | HEART RATE: 101 BPM | HEIGHT: 70 IN | SYSTOLIC BLOOD PRESSURE: 121 MMHG | WEIGHT: 242 LBS

## 2019-09-13 DIAGNOSIS — O46.90 VAGINAL BLEEDING DURING PREGNANCY: Primary | ICD-10-CM

## 2019-09-13 DIAGNOSIS — O41.8X20 SUBCHORIONIC HEMATOMA IN SECOND TRIMESTER, SINGLE OR UNSPECIFIED FETUS: ICD-10-CM

## 2019-09-13 DIAGNOSIS — Z3A.14 14 WEEKS GESTATION OF PREGNANCY: ICD-10-CM

## 2019-09-13 DIAGNOSIS — O46.8X2 SUBCHORIONIC HEMATOMA IN SECOND TRIMESTER, SINGLE OR UNSPECIFIED FETUS: ICD-10-CM

## 2019-09-13 DIAGNOSIS — O42.112 PRETERM PREMATURE RUPTURE OF MEMBRANES WITH ONSET OF LABOR MORE THAN 24 HOURS FOLLOWING RUPTURE IN SECOND TRIMESTER: Primary | ICD-10-CM

## 2019-09-13 LAB — BACTERIA UR CULT: NORMAL

## 2019-09-13 PROCEDURE — 76817 TRANSVAGINAL US OBSTETRIC: CPT | Performed by: OBSTETRICS & GYNECOLOGY

## 2019-09-13 PROCEDURE — 76816 OB US FOLLOW-UP PER FETUS: CPT | Performed by: OBSTETRICS & GYNECOLOGY

## 2019-09-13 PROCEDURE — 99281 EMR DPT VST MAYX REQ PHY/QHP: CPT | Performed by: EMERGENCY MEDICINE

## 2019-09-13 PROCEDURE — NC001 PR NO CHARGE: Performed by: OBSTETRICS & GYNECOLOGY

## 2019-09-13 PROCEDURE — 99284 EMERGENCY DEPT VISIT MOD MDM: CPT

## 2019-09-13 PROCEDURE — 99243 OFF/OP CNSLTJ NEW/EST LOW 30: CPT | Performed by: OBSTETRICS & GYNECOLOGY

## 2019-09-13 RX ORDER — ASPIRIN 81 MG/1
81 TABLET, CHEWABLE ORAL DAILY
COMMUNITY
End: 2019-09-17

## 2019-09-13 NOTE — ED NOTES
Pt returned from OB as LEONARDA and LMP do not match and they believe she is not 16wks gest  Pt to room Naif Osman RN  09/13/19 3439

## 2019-09-13 NOTE — ED NOTES
Phone call from person identifying as "sister", "I want to know who I have to talk to to get my sister seen  She has been in your waiting room bleeding since 0900 and is possibly bleeding out  This is ridiculous " Explained that the pt has been seen by both OB and a resident at the bedside, "sister", "A resident  I thought you guys had real doctors and on your web site they have it posted as real doctors " Informed the "sister" that all patients are seen by board certified doctors and she will be managed by both a resident and attending  Notified the resident who then spoke with the patient and the family directly        Jesus Alberto Martin RN  09/13/19 7711

## 2019-09-13 NOTE — PROGRESS NOTES
CONSULT NOTE    Jeri Vences MD  [address] [facility], [address]     Thank you for referring your Judy Covington for a Maternal-Fetal Medicine Consultation:  Below is my consultation  Dr Mumtaz Will,    Thank you very much for requesting a consultation on this very nice couple for the indication of vaginal bleeding  The patient states that yesterday morning she woke up with vaginal bleeding  It became worse and she presented to the emergency department  She had significant bleeding when I 1st happen yesterday  This morning she states that when she stood up, she felt a large gush of more fluid-like blood tinged fluid  She had some initial cramping  She denies any fevers or chills at this time  Her abdomen is nontender on exam     Today's ultrasound demonstrates significant oligohydramnios with a very large heterogeneous collection in the lower uterine segment consistent with a subchorionic bleed  The patient is description of symptoms and the significant oligohydramnios are most consistent with  pre labor rupture of membranes  Free floating membrane in the lower segment is also appreciated  The placenta is low lying with a subchorionic hematoma adjacent to inferior portion  I had a long discussion with the patient and her   We discussed concerns related to pre labor  rupture of membranes at a previable gestational age especially early at not quite 15 weeks  We discussed the significant increased risk for abruption, bleeding, active labor, fetal death in utero, and infection  We discussed fetal concerns related to fetal lung development given that the fetus has not yet entered the cannicular phase of lung development  We discussed concerns related the pulmonary hypoplasia in this regard  We discussed options for management including but not limited to expected management, active management of delivery, and a watchful waiting approach    Since this all just happened recently, we discussed close interval follow-up within a week to determine amniotic fluid, patient's symptoms, and to further discuss optimal management  We discussed appropriate precautions in detail and I encouraged the patient to call her obstetrician and come to the hospital immediately with any significant amount of vaginal bleeding  We discussed that vaginal bleeding can be significant at this gestational age of may require dilation and evacuation  The patient is understanding that if there is any concerns for a significant bleeding, dilation and evacuation would be a possible management strategy  I communicated today's findings to Dr Basil Beaver and Dr Steffanie Fung who are on call over the weekend for the Group  Thank you very much for allowing us to participate in the care of this very nice patient  Should you have any questions, please do not hesitate to contact our office  Please note, in addition to the time spent discussing the results of the ultrasound, I spent approximately 40 minutes of face-to-face time with the patient, greater than 50% of which was spent in counseling and the coordination of care for this patient  Christopher P Marylee Parma, MD  Attending Physician, [place]

## 2019-09-13 NOTE — ED NOTES
Pt wearing same pad from when she was in labor and delivery unit, bleeding is scant, denies cramping at this time  Reports right sided sciatica pain that radiates down right leg  Instructed on ja stretches for after discharge to assist with lower back pain  Mother and partner at bedside  Offered blanket & nourishment, pt refused at this time  Will continue to monitor         Kyle Vela RN  09/13/19 3705

## 2019-09-13 NOTE — ED ATTENDING ATTESTATION
Luna Gomes DO, saw and evaluated the patient  I have discussed the patient with the resident/non-physician practitioner and agree with the resident's/non-physician practitioner's findings, Plan of Care, and MDM as documented in the resident's/non-physician practitioner's note, except where noted  All available labs and Radiology studies were reviewed  At this point I agree with the current assessment done in the Emergency Department  I have conducted an independent evaluation of this patient including a focused history and a physical exam     ED Note Saul Wagoner 30 y o  female MRN: [id number]  Unit/Bed#: ED 22 Encounter: [id number]    History of Present Illness   HPI  Evonne Case is a 30 y o  female  at approximately 15 weeks gestation presents for evaluation of continued vaginal bleeding  Presents with increased abdominal pain  /Cramping and volume of bleeding  REVIEW OF SYSTEMS  See HPI for further details  12 systems reviewed and otherwise negative except as noted     Historical Information     PAST MEDICAL HISTORY  Past Medical History:   Diagnosis Date    Alcohol abuse     in past    Anxiety     Arthritis     bilateral hips    Cyst of breast, left     Depression     Lump of left breast     LAST ASSESSED: 17    Varicella        FAMILY HISTORY  Family History   Problem Relation Age of Onset    No Known Problems Mother     Substance Abuse Father         father  in  [place])    Alcohol abuse Father     Coronary artery disease Paternal Grandfather     Arthritis Paternal Grandfather     Alcohol abuse Other     Depression Other     Substance Abuse Other     Arthritis Family     No Known Problems Sister     No Known Problems Brother     No Known Problems Maternal Grandmother        SOCIAL HISTORY  Social History     Socioeconomic History    Marital status: Single     Spouse name: None    Number of children: None    Years of education: None    Highest education level: None   Occupational History    Occupation: FULL-TIME EMPLOYMENT   Social Needs    Financial resource strain: None    Food insecurity:     Worry: None     Inability: None    Transportation needs:     Medical: None     Non-medical: None   Tobacco Use    Smoking status: Former Smoker     Last attempt to quit: 2019     Years since quittin 2    Smokeless tobacco: Never Used    Tobacco comment: Quit   Substance and Sexual Activity    Alcohol use: No     Comment: sober 7 years    Drug use: Not Currently     Types: Marijuana     Comment: quit with pregnancy/ was using once a day prior    Sexual activity: Yes     Partners: Male     Birth control/protection: None   Lifestyle    Physical activity:     Days per week: None     Minutes per session: None    Stress: None   Relationships    Social connections:     Talks on phone: None     Gets together: None     Attends Sabianism service: None     Active member of club or organization: None     Attends meetings of clubs or organizations: None     Relationship status: None    Intimate partner violence:     Fear of current or ex partner: None     Emotionally abused: None     Physically abused: None     Forced sexual activity: None   Other Topics Concern    None   Social History Narrative    DAILY CAFFEINE CONSUMPTION, 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       SURGICAL HISTORY  Past Surgical History:   Procedure Laterality Date    TONSILLECTOMY       Meds/Allergies     CURRENT MEDICATIONS  No current facility-administered medications for this encounter       Current Outpatient Medications:     aspirin 81 mg chewable tablet, Chew 81 mg daily, Disp: , Rfl:     Magnesium 250 MG TABS, Take 1 tablet by mouth daily, Disp: , Rfl:     melatonin 1 mg, Take 10 mg by mouth daily at bedtime , Disp: , Rfl:     Prenatal MV & Min w/FA-DHA (PRENATAL ADULT GUMMY/DHA/FA PO), Take by mouth, Disp: , Rfl:     sertraline (ZOLOFT) 50 mg tablet, TAKE 1 5 TABLETS (75 MG TOTAL) BY MOUTH DAILY, Disp: 45 tablet, Rfl: 5    MULTIPLE VITAMINS ESSENTIAL PO, Take by mouth daily, Disp: , Rfl:     (Not in a hospital admission)    ALLERGIES  No Known Allergies  Objective     PHYSICAL EXAM    VITAL SIGNS: Blood pressure 128/71, pulse 85, temperature 98 3 °F (36 8 °C), resp  rate 16, weight 109 kg (240 lb), last menstrual period 05/22/2019, SpO2 100 %, not currently breastfeeding  Constitutional:  Appears well developed and well nourished, no acute distress, non-toxic appearance   Eyes:  PERRL, EOMI, conjunctivae pink, sclerae non-icteric    HENT:  Normocephalic/Atraumatic, no rhinorrhea, mucous membranes moist   Neck: normal range of motion, no tenderness, supple   Respiratory:  No respiratory distress, normal breath sounds  Cardiovascular:  Normal rate, normal rhythm   GI:  Soft, +ve lower abdominal tender, non-distended,  no mass, no rebound, no guarding   :  No CVAT, no flank ecchymosis   Musculoskeletal:  No swelling or edema, no tenderness, no deformities  Integument:  Pink, warm, dry, Well hydrated, no rash, no erythema, no bullae   Lymphatic:  No cervical/ tonsillar/ submandibular lymphadenopathy noted   Neurologic:  Awake, Alert & oriented x 3, CN 2-12 intact, no focal neurological deficits  Psychiatric:  Speech and behavior appropriate       ED COURSE and MDM:    Assessment/Plan   Assessment:  Asiya Alfaro is a 30 y o  female presents for evaluation of vaginal bleeding and abdominal cramping  Plan:    Labs, symptom management, imaging as needed, gyn consultation, disposition as appropriate      CRITICAL CARE TIME: 0 minutes      Portions of the record may have been created with voice recognition software  Occasional wrong word or "sound a like" substitutions may have occurred due to the inherent limitations of voice recognition software       ED Provider  Electronically Signed by

## 2019-09-13 NOTE — TELEPHONE ENCOUNTER
Returned pt call   14w 6d, pt when to er yesterday for bleeding  no discharge notes in chart however pt is crying stated currently red/pink fluid running down leg saturated pants and last night the pain got worse was at a 9 out of 1-10  pain was in her back and her core color rich red to dark with clots  pt stated she didn't go to the er last night due to didn't want to her the words she fears  [place] texted on call CT

## 2019-09-13 NOTE — ED NOTES
Call placed to senior resident for [place] [facility] consult, no answer  Will have resident attempt to contact        Alexander Jimenez RN  09/13/19 9420

## 2019-09-13 NOTE — TELEPHONE ENCOUNTER
Pt went to ER yesterday as advised and was sent home since they found a heart beat, she said the cramping was awful during the whole night and she is still bleeding

## 2019-09-13 NOTE — LETTER
2019     Bruno Victoria [address]    Patient: Judy Covington   YOB: 1990   Date of Visit: 2019       Dear Dr Mumtaz Will: Thank you for referring Judy Covington to me for evaluation  Below are my notes for this consultation  If you have questions, please do not hesitate to call me  I look forward to following your patient along with you  Sincerely,        Clarence Villagomez MD        CC: DO Clarence Stapleton MD  2019  5:41 PM  Sign at close encounter  [address] [place] [address], [address]     Thank you for referring your Judy Covington for a Maternal-Fetal Medicine Consultation:  Below is my consultation  Dr Mumtaz Will,    Thank you very much for requesting a consultation on this very nice couple for the indication of vaginal bleeding  The patient states that yesterday morning she woke up with vaginal bleeding  It became worse and she presented to the emergency department  She had significant bleeding when I 1st happen yesterday  This morning she states that when she stood up, she felt a large gush of more fluid-like blood tinged fluid  She had some initial cramping  She denies any fevers or chills at this time  Her abdomen is nontender on exam     Today's ultrasound demonstrates significant oligohydramnios with a very large heterogeneous collection in the lower uterine segment consistent with a subchorionic bleed  The patient is description of symptoms and the significant oligohydramnios are most consistent with  pre labor rupture of membranes  Free floating membrane in the lower segment is also appreciated  The placenta is low lying with a subchorionic hematoma adjacent to inferior portion  I had a long discussion with the patient and her     We discussed concerns related to pre labor  rupture of membranes at a previable gestational age especially early at not quite 15 weeks  We discussed the significant increased risk for abruption, bleeding, active labor, fetal death in utero, and infection  We discussed fetal concerns related to fetal lung development given that the fetus has not yet entered the cannicular phase of lung development  We discussed concerns related the pulmonary hypoplasia in this regard  We discussed options for management including but not limited to expected management, active management of delivery, and a watchful waiting approach  Since this all just happened recently, we discussed close interval follow-up within a week to determine amniotic fluid, patient's symptoms, and to further discuss optimal management  We discussed appropriate precautions in detail and I encouraged the patient to call her obstetrician and come to the hospital immediately with any significant amount of vaginal bleeding  We discussed that vaginal bleeding can be significant at this gestational age of may require dilation and evacuation  The patient is understanding that if there is any concerns for a significant bleeding, dilation and evacuation would be a possible management strategy  I communicated today's findings to Dr Justin Ashby and Dr Felecia Wells who are on call over the weekend for the Group  Thank you very much for allowing us to participate in the care of this very nice patient  Should you have any questions, please do not hesitate to contact our office  Please note, in addition to the time spent discussing the results of the ultrasound, I spent approximately 40 minutes of face-to-face time with the patient, greater than 50% of which was spent in counseling and the coordination of care for this patient  Winston Cohen MD  Attending Physician, [place]

## 2019-09-13 NOTE — CONSULTS
Consultation - Gynecology  Jessi Rudolph 30 y o  female MRN: [id number]  Unit/Bed#: ED 22 Encounter: [id number]    Consults      Chief Complaint   Patient presents with    Vaginal Bleeding - Pregnant     Pregnanat and bleeding more heavily today  Seen here yesterday for the same       History of Present Illness   Physician Requesting Consult: Lina Francis DO  Reason for Consult / Principal Problem: vaginal bleeding at 14 6 weeks   Subspeciality: General GYN     HPI: Jessi Rudolph is a 30 y o  female  at 17w8d who presents with vaginal bleeding since last night, she states there a pinkish discharge, sometimes with bright blood  She used 6 pads since yesterday  She denies contractions, cramping or pelvic pressure  She was evaluated yesterday in ED for vaginal bleeding with positive fetal tones on beside ultrasound  At the time of her evaluation, she was not bleeding  She continues to bleed and considers it to be worsening,     This pregnancy is complicated by:  1) Maternal obesity  2) History of THC use  3) Attention deficit disorder and anxiety  4) RH positive    Review of Systems   Constitutional: Negative  HENT: Negative  Eyes: Negative  Endocrine: Negative  Genitourinary: Positive for vaginal bleeding  Musculoskeletal: Negative  Skin: Negative  Allergic/Immunologic: Negative  Neurological: Negative  Hematological: Negative  Psychiatric/Behavioral: The patient is nervous/anxious          Historical Information   Past Medical History:   Diagnosis Date    Alcohol abuse     in past    Anxiety     Arthritis     bilateral hips    Cyst of breast, left     Depression     Lump of left breast     LAST ASSESSED: 17    Varicella      Past Surgical History:   Procedure Laterality Date    TONSILLECTOMY       OB History    Para Term  AB Living   1 0 0 0 0 0   SAB TAB Ectopic Multiple Live Births   0 0 0 0 0      # Outcome Date GA Lbr Geovany/2nd Weight Sex Delivery Anes PTL Lv   1 Current              Family History   Problem Relation Age of Onset    No Known Problems Mother     Substance Abuse Father         father  in  [place]    Alcohol abuse Father     Coronary artery disease Paternal Grandfather     Arthritis Paternal Grandfather     Alcohol abuse Other     Depression Other     Substance Abuse Other     Arthritis Family     No Known Problems Sister     No Known Problems Brother     No Known Problems Maternal Grandmother      Social History   Social History     Substance and Sexual Activity   Alcohol Use No    Comment: sober 7 years     Social History     Substance and Sexual Activity   Drug Use Not Currently    Types: Marijuana    Comment: quit with pregnancy/ was using once a day prior     Social History     Tobacco Use   Smoking Status Former Smoker    Last attempt to quit: 2019    Years since quittin 2   Smokeless Tobacco Never Used   Tobacco Comment    Quit       Meds/Allergies   No current facility-administered medications for this encounter  No Known Allergies    Objective   Vitals: Blood pressure 122/64, pulse 89, temperature 98 3 °F (36 8 °C), resp  rate 16, weight 109 kg (240 lb), last menstrual period 2019, SpO2 99 %, not currently breastfeeding  Body mass index is 34 93 kg/m²  No intake or output data in the 24 hours ending 19 1356      Physical Exam   Constitutional: She is oriented to person, place, and time  HENT:   Head: Normocephalic and atraumatic  Neck: Normal range of motion  Cardiovascular: Normal rate, regular rhythm, normal heart sounds and intact distal pulses  Pulmonary/Chest: Effort normal and breath sounds normal    Abdominal: Soft  Genitourinary:   Genitourinary Comments: Speculum examination with cervix: close/ thick/high   Neurological: She is alert and oriented to person, place, and time  Psychiatric: She has a normal mood and affect         Imaging:   last obstetric ultrasound:  2019   12 weeks and 4 days by this ultrasound  (LEONARDA=MAR 5 2020)  12 weeks and 2 days by 1st Tri Sono  (LEONARDA=MAR 7 2020)  Variable presentation  Regular fetal heart rate of 154 bpm  Posterior placenta    Bed side ultrasound today:  Positive fetal heart tone, placenta posterior, evaluation limited by equipment  EKG, Pathology, and Other Studies: I have personally reviewed pertinent reports  Assessment/Plan     Assessment: Naomi Gallego is a 30 y o  female  at 17w8d who presents with vaginal bleeding  Plan:  - Patient sent to [facility]  for formal US evaluation  Code Status: No Order    Counseling / Coordination of Care  Total floor / unit time spent today20 minutes  minutes  Greater than 50% of total time was spent with the patient and / or family counseling and / or coordination of care  A description of the counseling / coordination of care:      Jolly Ames MD  2019  1:56 PM

## 2019-09-13 NOTE — TELEPHONE ENCOUNTER
Per tiger response from on call- CT--I would have her return to ER, sandra pt contacted and advised as directed  Pt agreed to go

## 2019-09-13 NOTE — ED PROVIDER NOTES
History  Chief Complaint   Patient presents with    Vaginal Bleeding - Pregnant     Pregnanat and bleeding more heavily today  Seen here yesterday for the same     29F  15weeks pregnant here due to vaginal bleeding while pregnant  States the bleeding started 2 days ago and she was seen and discharged for the same issue yesterday in the ED  Says since being discharged her abdominal pain increased and the volume of bleed increased  Also has some lower abdominal pain which comes and goes and at times radiates to her back  Currently 3/10 pain  Denies fever, chills, chest pain, SOB, diarrhea, or urinary symptoms  Prior to Admission Medications   Prescriptions Last Dose Informant Patient Reported? Taking?    MULTIPLE VITAMINS ESSENTIAL PO Not Taking at Unknown time Self Yes No   Sig: Take by mouth daily   Magnesium 250 MG TABS 2019 at Unknown time Self Yes Yes   Sig: Take 1 tablet by mouth daily   Prenatal MV & Min w/FA-DHA (PRENATAL ADULT GUMMY/DHA/FA PO) 2019 at Unknown time Self Yes Yes   Sig: Take by mouth   aspirin 81 mg chewable tablet 2019 at Unknown time Self Yes Yes   Sig: Chew 81 mg daily   melatonin 1 mg 2019 at Unknown time Self Yes Yes   Sig: Take 10 mg by mouth daily at bedtime    sertraline (ZOLOFT) 50 mg tablet 2019 at Unknown time Self No Yes   Sig: TAKE 1 5 TABLETS (75 MG TOTAL) BY MOUTH DAILY      Facility-Administered Medications: None       Past Medical History:   Diagnosis Date    Alcohol abuse     in past    Anxiety     Arthritis     bilateral hips    Cyst of breast, left     Depression     Lump of left breast     LAST ASSESSED: 17    Varicella        Past Surgical History:   Procedure Laterality Date    TONSILLECTOMY         Family History   Problem Relation Age of Onset    No Known Problems Mother     Substance Abuse Father         father  in  [place])    Alcohol abuse Father     Coronary artery disease Paternal Grandfather  Arthritis Paternal Grandfather     Alcohol abuse Other     Depression Other     Substance Abuse Other     Arthritis Family     No Known Problems Sister     No Known Problems Brother     No Known Problems Maternal Grandmother      I have reviewed and agree with the history as documented  Social History     Tobacco Use    Smoking status: Former Smoker     Last attempt to quit: 2019     Years since quittin 2    Smokeless tobacco: Never Used    Tobacco comment: Quit   Substance Use Topics    Alcohol use: No     Comment: sober 7 years    Drug use: Not Currently     Types: Marijuana     Comment: quit with pregnancy/ was using once a day prior        Review of Systems   Constitutional: Negative for activity change, appetite change, chills, fatigue and fever  HENT: Negative for ear pain, rhinorrhea and tinnitus  Eyes: Negative for pain and visual disturbance  Respiratory: Negative for apnea, cough, chest tightness, shortness of breath and wheezing  Cardiovascular: Negative for chest pain, palpitations and leg swelling  Gastrointestinal: Positive for abdominal pain and nausea  Negative for diarrhea and vomiting  Genitourinary: Positive for vaginal bleeding  Negative for dysuria, flank pain and pelvic pain  Musculoskeletal: Negative for arthralgias and myalgias  Skin: Negative for rash and wound  Neurological: Negative for dizziness, syncope, weakness, numbness and headaches  All other systems reviewed and are negative        Physical Exam  ED Triage Vitals   Temperature Pulse Respirations Blood Pressure SpO2   19 1017 19 1017 19 1017 19 1017 19 1017   98 3 °F (36 8 °C) 88 18 147/85 99 %      Temp src Heart Rate Source Patient Position - Orthostatic VS BP Location FiO2 (%)   -- 19 1017 19 1145 19 1145 --    Monitor Sitting Left arm       Pain Score       19 1017       3             Orthostatic Vital Signs  Vitals: 09/13/19 1017 09/13/19 1145 09/13/19 1230 09/13/19 1330   BP: 147/85 160/91 128/71 122/64   Pulse: 88 92 85 89   Patient Position - Orthostatic VS:  Sitting Sitting Lying       Physical Exam   Constitutional: She is oriented to person, place, and time  She appears well-developed and well-nourished  No distress  HENT:   Head: Normocephalic and atraumatic  Eyes: Conjunctivae and EOM are normal  Right eye exhibits no discharge  Left eye exhibits no discharge  No scleral icterus  Neck: Normal range of motion  Neck supple  No JVD present  No tracheal deviation present  Cardiovascular: Normal rate, regular rhythm, normal heart sounds and intact distal pulses  Exam reveals no gallop and no friction rub  No murmur heard  Pulmonary/Chest: Effort normal and breath sounds normal  No stridor  No respiratory distress  She has no wheezes  She has no rales  She exhibits no tenderness  Abdominal: Soft  There is tenderness  Musculoskeletal: Normal range of motion  She exhibits no edema, tenderness or deformity  Neurological: She is alert and oriented to person, place, and time  No sensory deficit  She exhibits normal muscle tone  Skin: Skin is warm  Capillary refill takes less than 2 seconds  No rash noted  She is not diaphoretic  No erythema  Psychiatric: She has a normal mood and affect  Her behavior is normal  Judgment and thought content normal    Nursing note and vitals reviewed        ED Medications  Medications - No data to display    Diagnostic Studies  Results Reviewed     None                 No orders to display         Procedures  Procedures        ED Course  ED Course as of Sep 14 0626   Fri Sep 13, 2019   1317 Ob/Gyn now bedside with patient                                  MDM  Number of Diagnoses or Management Options  Diagnosis management comments: Ob/Gyn was contacted and agreed to take patient to their unit for evaluation      Disposition  Final diagnoses:   Vaginal bleeding during pregnancy Time reflects when diagnosis was documented in both MDM as applicable and the Disposition within this note     Time User Action Codes Description Comment    9/14/2019  6:26 AM Tito Diana Add [O46 90] Vaginal bleeding during pregnancy       ED Disposition     ED Disposition Condition Date/Time Comment    Discharge  Fri Sep 13, 2019  2:06 PM Hola Les Rizwana discharge to home/self care  Follow-up Information     Follow up With Specialties Details Why Contact Info Additional [facility], Nurse Practitioner   [place]  [address] [facility] [address]  [phone]       [address] [place] [address] Emergency Department Emergency Medicine Go to  If symptoms worsen [address]  [phone]  ED, [address] [place] [address], [id number]          Discharge Medication List as of 9/13/2019  2:06 PM      CONTINUE these medications which have NOT CHANGED    Details   aspirin 81 mg chewable tablet Chew 81 mg daily, Historical Med      Magnesium 250 MG TABS Take 1 tablet by mouth daily, Historical Med      melatonin 1 mg Take 10 mg by mouth daily at bedtime , Historical Med      Prenatal MV & Min w/FA-DHA (PRENATAL ADULT GUMMY/DHA/FA PO) Take by mouth, Historical Med      sertraline (ZOLOFT) 50 mg tablet TAKE 1 5 TABLETS (75 MG TOTAL) BY MOUTH DAILY, Starting Sun 7/21/2019, Normal      MULTIPLE VITAMINS ESSENTIAL PO Take by mouth daily, Historical Med           No discharge procedures on file  ED Provider  Attending physically available and evaluated [facility]  I managed the patient along with the ED Attending      Electronically Signed by         Lee Napier DO  09/14/19 9181

## 2019-09-16 ENCOUNTER — ULTRASOUND (OUTPATIENT)
Dept: PERINATAL CARE | Facility: CLINIC | Age: 29
End: 2019-09-16
Payer: COMMERCIAL

## 2019-09-16 ENCOUNTER — TELEPHONE (OUTPATIENT)
Dept: LABOR AND DELIVERY | Facility: HOSPITAL | Age: 29
End: 2019-09-16

## 2019-09-16 ENCOUNTER — TELEPHONE (OUTPATIENT)
Dept: OBGYN CLINIC | Facility: CLINIC | Age: 29
End: 2019-09-16

## 2019-09-16 VITALS
BODY MASS INDEX: 35.73 KG/M2 | HEART RATE: 104 BPM | DIASTOLIC BLOOD PRESSURE: 82 MMHG | SYSTOLIC BLOOD PRESSURE: 120 MMHG | HEIGHT: 70 IN | WEIGHT: 249.6 LBS

## 2019-09-16 DIAGNOSIS — Z3A.15 15 WEEKS GESTATION OF PREGNANCY: Primary | ICD-10-CM

## 2019-09-16 DIAGNOSIS — O99.210 MATERNAL OBESITY AFFECTING PREGNANCY, ANTEPARTUM: ICD-10-CM

## 2019-09-16 DIAGNOSIS — O46.8X2 SUBCHORIONIC HEMATOMA IN SECOND TRIMESTER, SINGLE OR UNSPECIFIED FETUS: ICD-10-CM

## 2019-09-16 DIAGNOSIS — Z34.00 SUPERVISION OF NORMAL FIRST PREGNANCY, ANTEPARTUM: ICD-10-CM

## 2019-09-16 DIAGNOSIS — O42.112 PRETERM PREMATURE RUPTURE OF MEMBRANES WITH ONSET OF LABOR MORE THAN 24 HOURS FOLLOWING RUPTURE IN SECOND TRIMESTER: ICD-10-CM

## 2019-09-16 DIAGNOSIS — Z87.898 HISTORY OF DRUG USE: ICD-10-CM

## 2019-09-16 DIAGNOSIS — O41.8X20 SUBCHORIONIC HEMATOMA IN SECOND TRIMESTER, SINGLE OR UNSPECIFIED FETUS: ICD-10-CM

## 2019-09-16 PROCEDURE — 76805 OB US >/= 14 WKS SNGL FETUS: CPT | Performed by: OBSTETRICS & GYNECOLOGY

## 2019-09-16 PROCEDURE — 99212 OFFICE O/P EST SF 10 MIN: CPT | Performed by: OBSTETRICS & GYNECOLOGY

## 2019-09-16 NOTE — TELEPHONE ENCOUNTER
Spoke with patient via phone today at 5:45pm   We discussed the options for management of her pregnancy complicated by previable PPROM and placental abruption  We reviewed the options for continuation, labor induction and D+E  After discussion of the risks/benefits of each, she wishes to proceed with D+E  She is aware that I will be the surgeon and that the procedure will take place when she is 16 weeks of gestation  She was counseled regarding the state's required waiting period of 24 hours  She is aware that the [facility] provides information regarding the stages of fetal development for women undergoing termination  She was offered this information and declined to see it  She was also informed that the father of the baby would be required to pay child support even if he has offered to pay for the termination  She states understanding of all of the above but wishes to proceed with the termination  Surgical coordinator will call the OR tomorrow to schedule the procedure, and we will have her come to the office prior to the procedure for laminaria

## 2019-09-16 NOTE — TELEPHONE ENCOUNTER
Dr Yara Varma from [facility] spoke with ED this morning and she will be calling pt  She has problem presently in triage  Dr  ED is aware and I also spoke to CT  I called pt and explained that one of these 2 MD'S will call her this pm  Offered our sincere apologies and pt was accepting   Awaiting cb

## 2019-09-16 NOTE — PROGRESS NOTES
Ob ultrasound and Brief MFM consultation    Follow-up ultrasound at Ms Wagoner's request at 15 2/7 weeks  Was diagnosed with severe oligohydramnios and possible SROM and abruptio placentae three days ago  No fever, no chills, no vaginal bleeding no uterine contractions in te last  three days  See Ob procedures in EPIC  1  Severe oligohydramnios  2  Live fetus  I discussed the options for management of the pregnancy and Ms Katherine Barahona indicated she and her , not present today, she was accompanied by her mother, considered the counseling received from Dr Allyson Raza last Friday  She has decided not continue the pregnancy  I indicated I would contact one of the members of your group, spoke with   [id number]  [facility] today,  and recommended a pre conceptional consultation with MFM when she considers is prepared for another attempt at pregnancy  I offered our support and availability  If fetal tissue can be retrieved and sent for genetic analysis it would be very useful for future counseling, as well as consider an anticardiolipin antibody panel, Antithrombin III  In addition to review of the ultrasound results I completed a consultation in 10 minutes with > 50% in direct face to face contact and coordination of a plan of care  Thank you for referring your patient to our offices  The limitations of ultrasound to detect all anomalies was reviewed and how it is not  a test to rule out aneuploidy  If you have any further questions do not hesitate to contact us as [phone]       Zoe Shelley MD

## 2019-09-17 ENCOUNTER — OFFICE VISIT (OUTPATIENT)
Dept: OBGYN CLINIC | Facility: CLINIC | Age: 29
End: 2019-09-17
Payer: COMMERCIAL

## 2019-09-17 ENCOUNTER — ANESTHESIA EVENT (OUTPATIENT)
Dept: PERIOP | Facility: HOSPITAL | Age: 29
End: 2019-09-17
Payer: COMMERCIAL

## 2019-09-17 VITALS — DIASTOLIC BLOOD PRESSURE: 78 MMHG | WEIGHT: 249 LBS | SYSTOLIC BLOOD PRESSURE: 124 MMHG | BODY MASS INDEX: 35.73 KG/M2

## 2019-09-17 DIAGNOSIS — O42.112 PRETERM PREMATURE RUPTURE OF MEMBRANES WITH ONSET OF LABOR MORE THAN 24 HOURS FOLLOWING RUPTURE IN SECOND TRIMESTER: Primary | ICD-10-CM

## 2019-09-17 DIAGNOSIS — O46.8X2 SUBCHORIONIC HEMATOMA IN SECOND TRIMESTER, SINGLE OR UNSPECIFIED FETUS: ICD-10-CM

## 2019-09-17 DIAGNOSIS — O41.8X20 SUBCHORIONIC HEMATOMA IN SECOND TRIMESTER, SINGLE OR UNSPECIFIED FETUS: ICD-10-CM

## 2019-09-17 PROBLEM — O42.912 PREMATURE RUPTURE OF MEMBRANES IN SECOND TRIMESTER: Status: ACTIVE | Noted: 2019-09-17

## 2019-09-17 PROCEDURE — 99215 OFFICE O/P EST HI 40 MIN: CPT | Performed by: OBSTETRICS & GYNECOLOGY

## 2019-09-17 PROCEDURE — 99070 SPECIAL SUPPLIES PHYS/QHP: CPT | Performed by: OBSTETRICS & GYNECOLOGY

## 2019-09-17 PROCEDURE — 59200 INSERT CERVICAL DILATOR: CPT | Performed by: OBSTETRICS & GYNECOLOGY

## 2019-09-17 RX ORDER — DOXYCYCLINE 100 MG/1
100 TABLET ORAL 2 TIMES DAILY
Qty: 2 TABLET | Refills: 0 | Status: SHIPPED | OUTPATIENT
Start: 2019-09-17 | End: 2019-09-18 | Stop reason: HOSPADM

## 2019-09-17 NOTE — PROGRESS NOTES
A/P: Pt is 30 y o  yo female with PPROM and placental abruption for dilation and evacuation at 15 wks  Surgical coordinator to Rick Otero  Patient to return for procedure tomorrow  Doxycycline 100mg BID sent to pharmacy  Total visit time was 45 minutes, of which >50% was spent in counseling and coordination of care regarding the above problem  S: Patient is 30 y o   at 13 3/7wks gestation with PPROM and placental abruption  She was referred by [facility] for management of this pregnancy with dilation and evacuation  She was in the process of sequential screening for genetic testing when the PPROM occurred  She had a normal ultrasound at 12 weeks  She was counseled on her options for management which include continuation of the pregnancy, labor induction and dilation and evacuation  She wishes to proceed with dilation and evacuation  She was counseled regarding the intraoperative procedure as well as the post-operative expectations  She understands the risks of surgery including but not limited to bleeding, transfusion, infection, uterine perforation, injury to surrounding organs including bowel/bladder, laparoscopy and/or laparotomy if injury occurs  All questions were answered, and consent was signed  She was counseled regarding the state's required waiting period of 24 hours via phone on 2019  She was counseled about her options for disposition  She opts for hospital disposition        Past Medical History:   Diagnosis Date    Alcohol abuse     in past    Anxiety     Arthritis     bilateral hips    Cyst of breast, left     Depression     Lump of left breast     LAST ASSESSED: 17    Varicella      Past Surgical History:   Procedure Laterality Date    TONSILLECTOMY       Social History   Current Outpatient Medications on File Prior to Visit   Medication Sig Dispense Refill    Magnesium 250 MG TABS Take 1 tablet by mouth daily      melatonin 1 mg Take 10 mg by mouth daily at bedtime       Prenatal MV & Min w/FA-DHA (PRENATAL ADULT GUMMY/DHA/FA PO) Take by mouth      sertraline (ZOLOFT) 50 mg tablet TAKE 1 5 TABLETS (75 MG TOTAL) BY MOUTH DAILY (Patient taking differently: Take 50 mg by mouth daily ) 45 tablet 5    [DISCONTINUED] aspirin 81 mg chewable tablet Chew 81 mg daily      [DISCONTINUED] MULTIPLE VITAMINS ESSENTIAL PO Take by mouth daily       No current facility-administered medications on file prior to visit  No Known Allergies    O:   Vitals:    09/17/19 1218   BP: 124/78     CV: RRR  Lungs: CTA b/l  Abd: gravid; nontender  Extremities: no pain/swelling    Procedure:  Laminaria insertion procedure was explained to the patient and she gave verbal consent for the procedure  She was placed in the dorsal lithotomy position  A speculum was inserted with good visualization of the cervix  The cervix was cleansed with Betadine  The anterior lip of the cervix was grasped with a single-tooth tenaculum  A paracervical block of 2% lidocaine without epinephrine was placed in divided doses at the 5 and 7 o'clock positions for a total of 10 cc  3XL, 2M laminaria were inserted into the cervix without difficulty  Tenaculum was removed from the anterior lip of the cervix  Two saline soaked gauze sponges were placed into the vagina  The speculum was removed  The patient tolerated the procedure well

## 2019-09-17 NOTE — H&P (VIEW-ONLY)
A/P: Pt is 30 y o  yo female with PPROM and placental abruption for dilation and evacuation at 15 wks  Surgical coordinator to Rick Otero  Patient to return for procedure tomorrow  Doxycycline 100mg BID sent to pharmacy  Total visit time was 45 minutes, of which >50% was spent in counseling and coordination of care regarding the above problem  S: Patient is 30 y o   at 13 3/7wks gestation with PPROM and placental abruption  She was referred by [facility] for management of this pregnancy with dilation and evacuation  She was in the process of sequential screening for genetic testing when the PPROM occurred  She had a normal ultrasound at 12 weeks  She was counseled on her options for management which include continuation of the pregnancy, labor induction and dilation and evacuation  She wishes to proceed with dilation and evacuation  She was counseled regarding the intraoperative procedure as well as the post-operative expectations  She understands the risks of surgery including but not limited to bleeding, transfusion, infection, uterine perforation, injury to surrounding organs including bowel/bladder, laparoscopy and/or laparotomy if injury occurs  All questions were answered, and consent was signed  She was counseled regarding the state's required waiting period of 24 hours via phone on 2019  She was counseled about her options for disposition  She opts for hospital disposition        Past Medical History:   Diagnosis Date    Alcohol abuse     in past    Anxiety     Arthritis     bilateral hips    Cyst of breast, left     Depression     Lump of left breast     LAST ASSESSED: 17    Varicella      Past Surgical History:   Procedure Laterality Date    TONSILLECTOMY       Social History   Current Outpatient Medications on File Prior to Visit   Medication Sig Dispense Refill    Magnesium 250 MG TABS Take 1 tablet by mouth daily      melatonin 1 mg Take 10 mg by mouth daily at bedtime       Prenatal MV & Min w/FA-DHA (PRENATAL ADULT GUMMY/DHA/FA PO) Take by mouth      sertraline (ZOLOFT) 50 mg tablet TAKE 1 5 TABLETS (75 MG TOTAL) BY MOUTH DAILY (Patient taking differently: Take 50 mg by mouth daily ) 45 tablet 5    [DISCONTINUED] aspirin 81 mg chewable tablet Chew 81 mg daily      [DISCONTINUED] MULTIPLE VITAMINS ESSENTIAL PO Take by mouth daily       No current facility-administered medications on file prior to visit  No Known Allergies    O:   Vitals:    09/17/19 1218   BP: 124/78     CV: RRR  Lungs: CTA b/l  Abd: gravid; nontender  Extremities: no pain/swelling    Procedure:  Laminaria insertion procedure was explained to the patient and she gave verbal consent for the procedure  She was placed in the dorsal lithotomy position  A speculum was inserted with good visualization of the cervix  The cervix was cleansed with Betadine  The anterior lip of the cervix was grasped with a single-tooth tenaculum  A paracervical block of 2% lidocaine without epinephrine was placed in divided doses at the 5 and 7 o'clock positions for a total of 10 cc  3XL, 2M laminaria were inserted into the cervix without difficulty  Tenaculum was removed from the anterior lip of the cervix  Two saline soaked gauze sponges were placed into the vagina  The speculum was removed  The patient tolerated the procedure well

## 2019-09-18 ENCOUNTER — ANESTHESIA (OUTPATIENT)
Dept: PERIOP | Facility: HOSPITAL | Age: 29
End: 2019-09-18
Payer: COMMERCIAL

## 2019-09-18 ENCOUNTER — HOSPITAL ENCOUNTER (OUTPATIENT)
Facility: HOSPITAL | Age: 29
Setting detail: OUTPATIENT SURGERY
Discharge: HOME | End: 2019-09-18
Attending: OBSTETRICS & GYNECOLOGY | Admitting: OBSTETRICS & GYNECOLOGY
Payer: COMMERCIAL

## 2019-09-18 VITALS
HEIGHT: 70 IN | SYSTOLIC BLOOD PRESSURE: 112 MMHG | BODY MASS INDEX: 34.36 KG/M2 | WEIGHT: 240 LBS | OXYGEN SATURATION: 95 % | DIASTOLIC BLOOD PRESSURE: 70 MMHG | RESPIRATION RATE: 16 BRPM | TEMPERATURE: 99.5 F | HEART RATE: 103 BPM

## 2019-09-18 DIAGNOSIS — O42.912 PREMATURE RUPTURE OF MEMBRANES IN SECOND TRIMESTER, UNSPECIFIED DURATION TO ONSET OF LABOR: ICD-10-CM

## 2019-09-18 LAB
ABO GROUP BLD: NORMAL
BLD GP AB SCN SERPL QL: NEGATIVE
RH BLD: POSITIVE
SPECIMEN EXPIRATION DATE: NORMAL

## 2019-09-18 PROCEDURE — 86900 BLOOD TYPING SEROLOGIC ABO: CPT | Performed by: OBSTETRICS & GYNECOLOGY

## 2019-09-18 PROCEDURE — 86901 BLOOD TYPING SEROLOGIC RH(D): CPT | Performed by: OBSTETRICS & GYNECOLOGY

## 2019-09-18 PROCEDURE — 59841 INDUCED ABORTION DILAT&EVAC: CPT | Performed by: OBSTETRICS & GYNECOLOGY

## 2019-09-18 PROCEDURE — 88305 TISSUE EXAM BY PATHOLOGIST: CPT | Performed by: PATHOLOGY

## 2019-09-18 PROCEDURE — 86850 RBC ANTIBODY SCREEN: CPT | Performed by: OBSTETRICS & GYNECOLOGY

## 2019-09-18 RX ORDER — OXYCODONE HYDROCHLORIDE 5 MG/1
5 TABLET ORAL EVERY 4 HOURS PRN
Status: DISCONTINUED | OUTPATIENT
Start: 2019-09-18 | End: 2019-09-18 | Stop reason: HOSPADM

## 2019-09-18 RX ORDER — IBUPROFEN 600 MG/1
600 TABLET ORAL EVERY 6 HOURS PRN
Status: DISCONTINUED | OUTPATIENT
Start: 2019-09-18 | End: 2019-09-18 | Stop reason: HOSPADM

## 2019-09-18 RX ORDER — KETOROLAC TROMETHAMINE 30 MG/ML
INJECTION, SOLUTION INTRAMUSCULAR; INTRAVENOUS AS NEEDED
Status: DISCONTINUED | OUTPATIENT
Start: 2019-09-18 | End: 2019-09-18 | Stop reason: SURG

## 2019-09-18 RX ORDER — SODIUM CHLORIDE, SODIUM LACTATE, POTASSIUM CHLORIDE, CALCIUM CHLORIDE 600; 310; 30; 20 MG/100ML; MG/100ML; MG/100ML; MG/100ML
125 INJECTION, SOLUTION INTRAVENOUS CONTINUOUS
Status: DISCONTINUED | OUTPATIENT
Start: 2019-09-18 | End: 2019-09-18 | Stop reason: HOSPADM

## 2019-09-18 RX ORDER — DEXAMETHASONE SODIUM PHOSPHATE 10 MG/ML
INJECTION, SOLUTION INTRAMUSCULAR; INTRAVENOUS AS NEEDED
Status: DISCONTINUED | OUTPATIENT
Start: 2019-09-18 | End: 2019-09-18 | Stop reason: SURG

## 2019-09-18 RX ORDER — SODIUM CHLORIDE, SODIUM LACTATE, POTASSIUM CHLORIDE, CALCIUM CHLORIDE 600; 310; 30; 20 MG/100ML; MG/100ML; MG/100ML; MG/100ML
INJECTION, SOLUTION INTRAVENOUS CONTINUOUS PRN
Status: DISCONTINUED | OUTPATIENT
Start: 2019-09-18 | End: 2019-09-18 | Stop reason: SURG

## 2019-09-18 RX ORDER — DOXYCYCLINE HYCLATE 100 MG/1
200 CAPSULE ORAL ONCE
Status: COMPLETED | OUTPATIENT
Start: 2019-09-18 | End: 2019-09-18

## 2019-09-18 RX ORDER — FENTANYL CITRATE/PF 50 MCG/ML
50 SYRINGE (ML) INJECTION
Status: DISCONTINUED | OUTPATIENT
Start: 2019-09-18 | End: 2019-09-18 | Stop reason: HOSPADM

## 2019-09-18 RX ORDER — PROPOFOL 10 MG/ML
INJECTION, EMULSION INTRAVENOUS AS NEEDED
Status: DISCONTINUED | OUTPATIENT
Start: 2019-09-18 | End: 2019-09-18 | Stop reason: SURG

## 2019-09-18 RX ORDER — ALBUMIN, HUMAN INJ 5% 5 %
SOLUTION INTRAVENOUS CONTINUOUS PRN
Status: DISCONTINUED | OUTPATIENT
Start: 2019-09-18 | End: 2019-09-18 | Stop reason: SURG

## 2019-09-18 RX ORDER — METHYLERGONOVINE MALEATE 0.2 MG/ML
INJECTION INTRAVENOUS AS NEEDED
Status: DISCONTINUED | OUTPATIENT
Start: 2019-09-18 | End: 2019-09-18 | Stop reason: SURG

## 2019-09-18 RX ORDER — ONDANSETRON 2 MG/ML
INJECTION INTRAMUSCULAR; INTRAVENOUS AS NEEDED
Status: DISCONTINUED | OUTPATIENT
Start: 2019-09-18 | End: 2019-09-18 | Stop reason: SURG

## 2019-09-18 RX ORDER — EPHEDRINE SULFATE 50 MG/ML
INJECTION INTRAVENOUS AS NEEDED
Status: DISCONTINUED | OUTPATIENT
Start: 2019-09-18 | End: 2019-09-18 | Stop reason: SURG

## 2019-09-18 RX ORDER — MISOPROSTOL 200 UG/1
400 TABLET ORAL ONCE
Status: COMPLETED | OUTPATIENT
Start: 2019-09-18 | End: 2019-09-18

## 2019-09-18 RX ORDER — LIDOCAINE HYDROCHLORIDE 10 MG/ML
INJECTION, SOLUTION INFILTRATION; PERINEURAL AS NEEDED
Status: DISCONTINUED | OUTPATIENT
Start: 2019-09-18 | End: 2019-09-18 | Stop reason: SURG

## 2019-09-18 RX ORDER — ACETAMINOPHEN 325 MG/1
650 TABLET ORAL EVERY 6 HOURS PRN
Status: DISCONTINUED | OUTPATIENT
Start: 2019-09-18 | End: 2019-09-18 | Stop reason: HOSPADM

## 2019-09-18 RX ORDER — MAGNESIUM HYDROXIDE 1200 MG/15ML
LIQUID ORAL AS NEEDED
Status: DISCONTINUED | OUTPATIENT
Start: 2019-09-18 | End: 2019-09-18 | Stop reason: HOSPADM

## 2019-09-18 RX ORDER — IBUPROFEN 400 MG/1
600 TABLET ORAL EVERY 6 HOURS PRN
COMMUNITY
End: 2019-10-02 | Stop reason: ALTCHOICE

## 2019-09-18 RX ORDER — MISOPROSTOL 100 UG/1
TABLET ORAL AS NEEDED
Status: DISCONTINUED | OUTPATIENT
Start: 2019-09-18 | End: 2019-09-18 | Stop reason: HOSPADM

## 2019-09-18 RX ORDER — HYDROMORPHONE HCL/PF 1 MG/ML
0.2 SYRINGE (ML) INJECTION
Status: DISCONTINUED | OUTPATIENT
Start: 2019-09-18 | End: 2019-09-18 | Stop reason: HOSPADM

## 2019-09-18 RX ORDER — MIDAZOLAM HYDROCHLORIDE 1 MG/ML
INJECTION INTRAMUSCULAR; INTRAVENOUS AS NEEDED
Status: DISCONTINUED | OUTPATIENT
Start: 2019-09-18 | End: 2019-09-18 | Stop reason: SURG

## 2019-09-18 RX ORDER — ONDANSETRON 2 MG/ML
4 INJECTION INTRAMUSCULAR; INTRAVENOUS ONCE AS NEEDED
Status: DISCONTINUED | OUTPATIENT
Start: 2019-09-18 | End: 2019-09-18 | Stop reason: HOSPADM

## 2019-09-18 RX ORDER — DEXMEDETOMIDINE HYDROCHLORIDE 100 UG/ML
INJECTION, SOLUTION INTRAVENOUS AS NEEDED
Status: DISCONTINUED | OUTPATIENT
Start: 2019-09-18 | End: 2019-09-18 | Stop reason: SURG

## 2019-09-18 RX ORDER — FENTANYL CITRATE 50 UG/ML
INJECTION, SOLUTION INTRAMUSCULAR; INTRAVENOUS AS NEEDED
Status: DISCONTINUED | OUTPATIENT
Start: 2019-09-18 | End: 2019-09-18 | Stop reason: SURG

## 2019-09-18 RX ADMIN — LIDOCAINE HYDROCHLORIDE 100 MG: 10 INJECTION, SOLUTION INFILTRATION; PERINEURAL at 08:25

## 2019-09-18 RX ADMIN — ONDANSETRON 4 MG: 2 INJECTION INTRAMUSCULAR; INTRAVENOUS at 08:44

## 2019-09-18 RX ADMIN — EPHEDRINE SULFATE 5 MG: 50 INJECTION, SOLUTION INTRAVENOUS at 09:22

## 2019-09-18 RX ADMIN — DEXMEDETOMIDINE HCL 10 MCG: 100 INJECTION INTRAVENOUS at 08:47

## 2019-09-18 RX ADMIN — FENTANYL CITRATE 50 MCG: 50 INJECTION, SOLUTION INTRAMUSCULAR; INTRAVENOUS at 08:45

## 2019-09-18 RX ADMIN — PHENYLEPHRINE HYDROCHLORIDE 100 MCG: 10 INJECTION INTRAVENOUS at 08:32

## 2019-09-18 RX ADMIN — PHENYLEPHRINE HYDROCHLORIDE 150 MCG: 10 INJECTION INTRAVENOUS at 08:44

## 2019-09-18 RX ADMIN — DEXMEDETOMIDINE HCL 10 MCG: 100 INJECTION INTRAVENOUS at 08:44

## 2019-09-18 RX ADMIN — METHYLERGONOVINE MALEATE 0.2 MG: 0.2 INJECTION, SOLUTION INTRAMUSCULAR; INTRAVENOUS at 09:32

## 2019-09-18 RX ADMIN — PHENYLEPHRINE HYDROCHLORIDE 150 MCG: 10 INJECTION INTRAVENOUS at 09:16

## 2019-09-18 RX ADMIN — EPHEDRINE SULFATE 5 MG: 50 INJECTION, SOLUTION INTRAVENOUS at 09:38

## 2019-09-18 RX ADMIN — PHENYLEPHRINE HYDROCHLORIDE 100 MCG: 10 INJECTION INTRAVENOUS at 08:37

## 2019-09-18 RX ADMIN — FENTANYL CITRATE 25 MCG: 50 INJECTION, SOLUTION INTRAMUSCULAR; INTRAVENOUS at 09:13

## 2019-09-18 RX ADMIN — FENTANYL CITRATE 50 MCG: 50 INJECTION, SOLUTION INTRAMUSCULAR; INTRAVENOUS at 08:47

## 2019-09-18 RX ADMIN — EPHEDRINE SULFATE 5 MG: 50 INJECTION, SOLUTION INTRAVENOUS at 09:01

## 2019-09-18 RX ADMIN — FENTANYL CITRATE 50 MCG: 50 INJECTION, SOLUTION INTRAMUSCULAR; INTRAVENOUS at 08:35

## 2019-09-18 RX ADMIN — ALBUMIN (HUMAN): 12.5 SOLUTION INTRAVENOUS at 08:53

## 2019-09-18 RX ADMIN — PHENYLEPHRINE HYDROCHLORIDE 150 MCG: 10 INJECTION INTRAVENOUS at 09:07

## 2019-09-18 RX ADMIN — KETOROLAC TROMETHAMINE 30 MG: 30 INJECTION, SOLUTION INTRAMUSCULAR at 09:51

## 2019-09-18 RX ADMIN — FENTANYL CITRATE 25 MCG: 50 INJECTION, SOLUTION INTRAMUSCULAR; INTRAVENOUS at 08:25

## 2019-09-18 RX ADMIN — FENTANYL CITRATE 25 MCG: 50 INJECTION, SOLUTION INTRAMUSCULAR; INTRAVENOUS at 09:03

## 2019-09-18 RX ADMIN — PROPOFOL 30 MG: 10 INJECTION, EMULSION INTRAVENOUS at 08:35

## 2019-09-18 RX ADMIN — MIDAZOLAM 2 MG: 1 INJECTION INTRAMUSCULAR; INTRAVENOUS at 08:18

## 2019-09-18 RX ADMIN — EPHEDRINE SULFATE 5 MG: 50 INJECTION, SOLUTION INTRAVENOUS at 09:07

## 2019-09-18 RX ADMIN — FENTANYL CITRATE 25 MCG: 50 INJECTION, SOLUTION INTRAMUSCULAR; INTRAVENOUS at 09:01

## 2019-09-18 RX ADMIN — PHENYLEPHRINE HYDROCHLORIDE 150 MCG: 10 INJECTION INTRAVENOUS at 08:53

## 2019-09-18 RX ADMIN — MISOPROSTOL 400 MCG: 200 TABLET ORAL at 07:33

## 2019-09-18 RX ADMIN — FENTANYL CITRATE 25 MCG: 50 INJECTION, SOLUTION INTRAMUSCULAR; INTRAVENOUS at 08:28

## 2019-09-18 RX ADMIN — PROPOFOL 150 MG: 10 INJECTION, EMULSION INTRAVENOUS at 08:25

## 2019-09-18 RX ADMIN — DEXAMETHASONE SODIUM PHOSPHATE 10 MG: 10 INJECTION, SOLUTION INTRAMUSCULAR; INTRAVENOUS at 08:44

## 2019-09-18 RX ADMIN — EPHEDRINE SULFATE 5 MG: 50 INJECTION, SOLUTION INTRAVENOUS at 09:30

## 2019-09-18 RX ADMIN — PROPOFOL 50 MG: 10 INJECTION, EMULSION INTRAVENOUS at 08:29

## 2019-09-18 RX ADMIN — SODIUM CHLORIDE, SODIUM LACTATE, POTASSIUM CHLORIDE, AND CALCIUM CHLORIDE: .6; .31; .03; .02 INJECTION, SOLUTION INTRAVENOUS at 08:22

## 2019-09-18 RX ADMIN — PHENYLEPHRINE HYDROCHLORIDE 100 MCG: 10 INJECTION INTRAVENOUS at 09:24

## 2019-09-18 RX ADMIN — ALBUMIN (HUMAN): 12.5 SOLUTION INTRAVENOUS at 09:11

## 2019-09-18 RX ADMIN — FENTANYL CITRATE 25 MCG: 50 INJECTION, SOLUTION INTRAMUSCULAR; INTRAVENOUS at 09:25

## 2019-09-18 RX ADMIN — DOXYCYCLINE 200 MG: 100 CAPSULE ORAL at 07:32

## 2019-09-18 NOTE — ANESTHESIA POSTPROCEDURE EVALUATION
Post-Op Assessment Note    CV Status:  Stable    Pain management: adequate     Mental Status:  Alert and awake   Hydration Status:  Euvolemic   PONV Controlled:  Controlled   Airway Patency:  Patent   Post Op Vitals Reviewed: Yes      Staff: Anesthesiologist, CRNA           BP (P) 124/66 (09/18/19 0955)    Temp (P) 97 8 °F (36 6 °C) (09/18/19 0955)    Pulse (!) 125 (09/18/19 0955)   Resp   15   SpO2 (P) 100 % (09/18/19 0955)

## 2019-09-18 NOTE — DISCHARGE INSTRUCTIONS
Dilation and Curettage   WHAT YOU NEED TO KNOW:   Dilation and curettage (D&C) is a procedure to remove tissue from the lining of your uterus  DISCHARGE INSTRUCTIONS:   Call 911 for any of the following:   · You have signs of an allergic reaction, such as hives, trouble breathing, or severe swelling  Seek care immediately if:   · You have heavy vaginal bleeding that soaks 1 pad in 1 hour for 2 hours in a row  · You have a fever higher than 100 4°F (38°C)  · You have abdominal cramps for more than 2 days  · Your pain does not get better, even after treatment  Contact your healthcare provider if:   · You have foul-smelling vaginal discharge  · You do not get your monthly period  · You feel depressed or anxious  · You feel very tired and weak  · You have questions or concerns about your condition or care  Medicines: You may need any of the following:  · Prescription pain medicine  may be given  Do not wait until the pain is severe before you take your medicine  Ask your healthcare provider how to take this medicine safely  · Antibiotics  help fight or prevent a bacterial infection  · Take your medicine as directed  Contact your healthcare provider if you think your medicine is not helping or if you have side effects  Tell him or her if you are allergic to any medicine  Keep a list of the medicines, vitamins, and herbs you take  Include the amounts, and when and why you take them  Bring the list or the pill bottles to follow-up visits  Carry your medicine list with you in case of an emergency  Self-care:   · Use sanitary pads if needed  You may have light bleeding for up to 2 weeks  Do not use tampons  Use sanitary pads instead  This will help prevent a vaginal infection  · Rest as needed  Slowly start to do more each day  Return to your daily activities as directed  · Do not have sex for at least 2 weeks after the procedure    This will help prevent an infection  · Use birth control right after your procedure  Your monthly period should start again in 4 to 8 weeks  During this time, you could still ovulate (release an egg)  Use birth control as directed to prevent pregnancy during this time  Follow up with your healthcare provider as directed:  Write down your questions so you remember to ask them during your visits  © 2017 [employer]  Information is for End User's use only and may not be sold, redistributed or otherwise used for commercial purposes  All illustrations and images included in CareNotes® are the copyrighted property of [employer]  or Kristopher Lucas  The above information is an  only  It is not intended as medical advice for individual conditions or treatments  Talk to your doctor, nurse or pharmacist before following any medical regimen to see if it is safe and effective for you

## 2019-09-18 NOTE — OP NOTE
OPERATIVE REPORT  PATIENT NAME: Elier Givens    :  1990  MRN: [id number]  Pt Location: [facility] OR ROOM 14    SURGERY DATE: 2019    Surgeon(s) and Role:     * Patrice Hamilton MD - Primary     * Kari Hernandes DO - Assisting     * Venessa Dixon MD - Assisting    Preop Diagnosis:  Premature rupture of membranes in second trimester, unspecified duration to onset of labor [O42 912]  15 weeks of gestation    Post-Op Diagnosis Codes:     * Premature rupture of membranes in second trimester, unspecified duration to onset of labor [O42 912]    Procedure(s) (LRB):  DILATATION AND EVACUATION (D&E) 15 Weeks (N/A)    Specimen(s):  ID Type Source Tests Collected by Time Destination   1 :  Tissue Products of Conception TISSUE Angélica Ty MD 2019 [id number]    2 :  Tissue (Placenta on Hold) OB Only Placenta TISSUE EXAM OB (PLACENTA) ONLY Patrice Hamilton MD 2019 [id number]        Estimated Blood Loss:   700 mL    Drains:  None    Anesthesia Type:   General    Operative Indications:  Premature rupture of membranes in second trimester, unspecified duration to onset of labor [O42 912]  15 weeks of gestation    Operative Findings:  Grossly normal appearing external genitalia  On bimanual examination, anteverted uterus at 15 weeks size  No adnexal masses or fullness appreciated  Postprocedure examination of products revealed complete evacuation    Complications:   None    Procedure and Technique:  Brief History    All risks, benefits, and alternatives to the procedure were discussed with the patient and she had the opportunity to ask questions  Informed consent was obtained  Description of Procedure    Patient was taken to the operating room were a time out was performed to confirm correct patient and correct procedure  General LMA anesthesia (LMA) was administered and the patient was positioned on the OR table in the dorsal lithotomy position   All pressure points were padded and a tae hugger was placed to maintain control of core body temperature  A bimanual exam was performed and the uterus was noted to be anteverted, 15 weeks in size and consistency with no palpable adnexal masses or fullness  The patient was prepped and draped in the usual sterile fashion  Operative Technique    A straight catheter was introduced into the bladder, which was drained of 100cc of clear yellow urine  Intrauterine products were then completely evacuated under ultrasonographic guidance  At the conclusion of the procedure, all needle, sponge, and instrument counts were noted to be correct x2  Pt received a single dose of Methergine 0 2mg IM and Cytotec 1000mcg rectally  Patient tolerated the procedure well and was transferred to PACU in stable condition prior to discharge with follow up in 1-2 weeks  Dr Christophe Renee was present and participated in all key portions of the case        Patient Disposition:  PACU , hemodynamically stable and extubated and stable    SIGNATURE: Shailesh Spencer DO  DATE: September 18, 2019  TIME: 9:57 AM

## 2019-09-18 NOTE — ANESTHESIA PREPROCEDURE EVALUATION
Review of Systems/Medical History  Patient summary reviewed  Chart reviewed      Cardiovascular   Pulmonary       GI/Hepatic            Endo/Other     GYN       Hematology   Musculoskeletal       Neurology   Psychology           Physical Exam    Airway    Mallampati score: I  TM Distance: >3 FB  Neck ROM: full     Dental       Cardiovascular      Pulmonary      Other Findings        Anesthesia Plan  ASA Score- 1     Anesthesia Type- general with ASA Monitors  Additional Monitors:   Airway Plan: LMA  Plan Factors-    Induction- intravenous  Postoperative Plan-     Informed Consent- Anesthetic plan and risks discussed with patient  I personally reviewed this patient with the CRNA  Discussed and agreed on the Anesthesia Plan with the CRNA  Mayi Castro

## 2019-09-18 NOTE — INTERVAL H&P NOTE
H&P reviewed  After examining the patient I find no changes in the patients condition since the H&P had been written      Vitals:    09/18/19 0656   BP: 104/55   Pulse: 84   Resp: 18   Temp: 98 5 °F (36 9 °C)   SpO2: 100%

## 2019-09-24 ENCOUNTER — TELEPHONE (OUTPATIENT)
Dept: OBGYN CLINIC | Age: 29
End: 2019-09-24

## 2019-09-24 ENCOUNTER — TELEPHONE (OUTPATIENT)
Dept: OBGYN CLINIC | Facility: CLINIC | Age: 29
End: 2019-09-24

## 2019-10-02 ENCOUNTER — OFFICE VISIT (OUTPATIENT)
Dept: OBGYN CLINIC | Facility: CLINIC | Age: 29
End: 2019-10-02

## 2019-10-02 VITALS — WEIGHT: 253 LBS | BODY MASS INDEX: 36.3 KG/M2 | DIASTOLIC BLOOD PRESSURE: 78 MMHG | SYSTOLIC BLOOD PRESSURE: 124 MMHG

## 2019-10-02 DIAGNOSIS — Z09 POSTOPERATIVE EXAMINATION: Primary | ICD-10-CM

## 2019-10-02 DIAGNOSIS — Z87.59 HISTORY OF PRETERM PREMATURE RUPTURE OF MEMBRANES (PPROM): ICD-10-CM

## 2019-10-02 PROBLEM — O42.112 PRETERM PREMATURE RUPTURE OF MEMBRANES WITH ONSET OF LABOR MORE THAN 24 HOURS FOLLOWING RUPTURE IN SECOND TRIMESTER: Status: RESOLVED | Noted: 2019-09-13 | Resolved: 2019-10-02

## 2019-10-02 PROBLEM — O42.912 PREMATURE RUPTURE OF MEMBRANES IN SECOND TRIMESTER: Status: RESOLVED | Noted: 2019-09-17 | Resolved: 2019-10-02

## 2019-10-02 PROBLEM — O41.8X20 SUBCHORIONIC HEMATOMA IN SECOND TRIMESTER: Status: RESOLVED | Noted: 2019-08-27 | Resolved: 2019-10-02

## 2019-10-02 PROBLEM — O46.8X2 SUBCHORIONIC HEMATOMA IN SECOND TRIMESTER: Status: RESOLVED | Noted: 2019-08-27 | Resolved: 2019-10-02

## 2019-10-02 PROBLEM — Z34.00 SUPERVISION OF NORMAL FIRST PREGNANCY, ANTEPARTUM: Status: RESOLVED | Noted: 2019-08-27 | Resolved: 2019-10-02

## 2019-10-02 PROBLEM — O99.210 MATERNAL OBESITY AFFECTING PREGNANCY, ANTEPARTUM: Status: RESOLVED | Noted: 2019-08-27 | Resolved: 2019-10-02

## 2019-10-02 PROBLEM — Z3A.15 15 WEEKS GESTATION OF PREGNANCY: Status: RESOLVED | Noted: 2019-09-13 | Resolved: 2019-10-02

## 2019-10-02 PROCEDURE — 99024 POSTOP FOLLOW-UP VISIT: CPT | Performed by: OBSTETRICS & GYNECOLOGY

## 2019-10-02 NOTE — PROGRESS NOTES
Post - Operative Visit    Naya Sigala is a 30 y o  female here for post-operative visit  She is s/p D+E on 9/18/2019  Her surgery was uncomplicated  Her post-operative course has been uneventful  She denies fevers  She has no bowel or bladder concerns  She has no vaginal discharge or concerning bleeding  Still has some occasional bleeding  Review of Systems - Negative    Reviewed pathology and likely early infection results with her  Encouraged preconception consultation with MFM  Past Medical History:   Diagnosis Date    Alcohol abuse     in past    Anxiety     Arthritis     bilateral hips    Cyst of breast, left     Depression     Lump of left breast     LAST ASSESSED: 4/13/17    Varicella      Past Surgical History:   Procedure Laterality Date    [place] INDUCED ABORTN BY DIL/EVAC N/A 9/18/2019    Procedure: DILATATION AND EVACUATION (D&E) 15 Weeks;  Surgeon: Sg Mckeon MD;  Location: [facility];  Service: Gynecology    TONSILLECTOMY         Objective:  /78 (BP Location: Left arm, Patient Position: Sitting, Cuff Size: Large)   Wt 115 kg (253 lb)   LMP 05/22/2019 (Exact Date)   Breastfeeding?  Unknown   BMI 36 30 kg/m²   Physical Exam   Genitourinary: Vagina normal and uterus normal    Genitourinary Comments: Cervix closed; uterine exam limited by patient body habitus       A:  30 y o  s/p D+E  - doing well post-operatively    P:  Preconception consultation with MFM  Condoms until normal menses x 1  Continue PNV  Return with positive home pregnancy test for ultrasound, prenatal care

## 2019-10-27 NOTE — PROGRESS NOTES
PRECONCEPTION CONSULTATION: MATERNAL-FETAL MEDICINE    Referring physician:   [facility]  [facility], [address]    Reason for consultation:   Chief Complaint   Patient presents with    Consult     followup after recent midtrimester loss, wants to know what can be done next time (if anything) to prevent it from happening again  Dear Dr Bushra Tanner,    Thank you for referring patient Palomo Willingham for preconception Maternal-Fetal Medicine consultation regarding recent midtrimester loss  Annette Lewis is familiar to me from early in her recent pregnancy  As you know, she is a 30y o  year-old para   Her history is as follows:    Past Medical History:   Diagnosis Date    Alcohol abuse     in past    Anxiety     Arthritis     bilateral hips    Cyst of breast, left     Depression     Lump of left breast     LAST ASSESSED: 17    Varicella      Past Surgical History:   Procedure Laterality Date    [place] INDUCED ABORTN BY DIL/EVAC N/A 2019    Procedure: DILATATION AND EVACUATION (D&E) 15 Weeks;  Surgeon: Leisa Rodas MD;  Location: [facility];  Service: Gynecology    TONSILLECTOMY       OB History    Para Term  AB Living   1 0 0 0 1 0   SAB TAB Ectopic Multiple Live Births   1 0 0 0 0      # Outcome Date GA Lbr Geovany/2nd Weight Sex Delivery Anes PTL Lv   1 SAB 19 15w4d       ND      Birth Comments: bleeding and PPROM at 14w6d; had D&E; initial presentation was with heavy vaginal bleeding, and the next morning she experienced PPROM  Gynecologic history: Patient's last menstrual period was 10/23/2019  This was a short cycle though normal cycles are 5 days long occurring every 28 days      Social History     Socioeconomic History    Marital status: Single     Spouse name: Not on file    Number of children: Not on file    Years of education: Not on file    Highest education level: Not on file   Occupational History    Occupation: FULL-TIME EMPLOYMENT   Social Needs    Financial resource strain: Not on file    Food insecurity:     Worry: Not on file     Inability: Not on file    Transportation needs:     Medical: Not on file     Non-medical: Not on file   Tobacco Use    Smoking status: Former Smoker     Last attempt to quit: 2019     Years since quittin 3    Smokeless tobacco: Never Used    Tobacco comment: Quit   Substance and Sexual Activity    Alcohol use: No     Comment: sober 7 years    Drug use: Not Currently     Types: Marijuana     Comment: quit with pregnancy/ was using once a day prior    Sexual activity: Yes     Partners: Male     Birth control/protection: None   Lifestyle    Physical activity:     Days per week: Not on file     Minutes per session: Not on file    Stress: Not on file   Relationships    Social connections:     Talks on phone: Not on file     Gets together: Not on file     Attends Evangelical service: Not on file     Active member of club or organization: Not on file     Attends meetings of clubs or organizations: Not on file     Relationship status: Not on file    Intimate partner violence:     Fear of current or ex partner: Not on file     Emotionally abused: Not on file     Physically abused: Not on file     Forced sexual activity: Not on file   Other Topics Concern    Not on file   Social History Narrative    DAILY [employer], 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     Family History   Problem Relation Age of Onset    No Known Problems Mother     Substance Abuse Father         father  in  [place]    Alcohol abuse Father     Coronary artery disease Paternal Grandfather     Arthritis Paternal Grandfather     Alcohol abuse Other     Depression Other     Substance Abuse Other     Arthritis Family     No Known Problems Sister     No Known Problems Brother     No Known Problems Maternal Grandmother        Current Outpatient Medications:     Magnesium 250 MG TABS, Take 1 tablet by mouth daily, Disp: , Rfl:     melatonin 1 mg, Take 10 mg by mouth daily at bedtime , Disp: , Rfl:     Prenatal MV & Min w/FA-DHA (PRENATAL ADULT GUMMY/DHA/FA PO), Take by mouth, Disp: , Rfl:     sertraline (ZOLOFT) 50 mg tablet, Take 1 tablet (50 mg total) by mouth daily, Disp: 45 tablet, Rfl: 5    No Known Allergies    Exam:  Vitals: Blood pressure 121/85, pulse 64, height 5' 10" (1 778 m), weight 119 kg (262 lb 6 4 oz), last menstrual period 10/23/2019, not currently breastfeeding  General appearance: alert, well appearing, and in no distress, oriented to person, place, and time, acyanotic, in no respiratory distress; appropriately tearful at times  The remainder of her physical examination was deferred as she was here today for consultation and discussion  Assessment and Plan: Ms Josh Souza is a 30y o  year-old para  here for preconception counseling  By issue:    Problem List Items Addressed This Visit        Other    Generalized anxiety disorder    Relevant Medications    sertraline (ZOLOFT) 50 mg tablet    Encounter for preconception consultation - Primary     Discussed that 50% of the pregnancies in this [place] are unplanned and advised barrier methods at this time as well as reinitiation of prenatal vitamins  Regarding interpregnancy interval advised 6 months given probable component of cervical insuffiiency  If she cannot wait 6 months then advised at least 3 months  Acknowledged uncertainty in recommendations in interpregnancy interval timing given not a straightforward etiology to her midtrimester loss  History of  delivery     We discussed her history of midtrimester loss and that the etiology is not straightforward and singular, given components of abruption (heavy bleeding), PPROM (leaking fluid) and probable some component of cervical insufficiency    We discussed that her delivery prior to 16 weeks, while from a standpoint of traditional -parity classification, would not qualify her for [facility], however we also discussed that very recently published study (PROLONG) questions the utility of this medication  We discussed that the timing of her loss occurred prior to the typical initiation of serial cervical length surveillance when patients have a prior  birth (we typically initiate surveillance at 16 weeks)  For a subsequent pregnancy should she choose to undertake one, there are multiple acceptable options in terms of management  A history-indicated cerclage (placed at 12-14 weeks) is an option though I did not strongly recommend this  Cervical surveillance (q2 weeks beginning at 16-20 weeks, not continuing past 24 weeks) with the option to (but not strong recommendation to) take a baseline measurement prior to 16 weeks  [facility] may be off the market soon pending impact of recent PROLONG study, and also as her delivery was prior 18 weeks it would not be covered  Vaginal progesterone is an option, either prophylactically or if her cervix shortens  I recommend that her care be individualized in a subsequent pregnancy, again given early gestational age at which her delivery occurred  I would again recommend aspirin prophylaxis given reduced risk of abruption  Other Visit Diagnoses     History of  premature rupture of membranes (PPROM)            A total of 40 minutes were spent in this encounter with >50% of the time spent in face-to-face counseling and in coordination of care  We should see her back once pregnant or sooner should she have any additional questions  At the conclusion of today's encounter, all questions were answered to her satisfaction  Thank you very much for this kind referral and please do not hesitate to contact me with any further questions or concerns      Sincerely,    Ze Fabian MD  Attending Physician, [place]

## 2019-10-28 ENCOUNTER — CONSULT (OUTPATIENT)
Dept: PERINATAL CARE | Facility: CLINIC | Age: 29
End: 2019-10-28
Payer: COMMERCIAL

## 2019-10-28 VITALS
DIASTOLIC BLOOD PRESSURE: 85 MMHG | HEART RATE: 64 BPM | HEIGHT: 70 IN | WEIGHT: 262.4 LBS | SYSTOLIC BLOOD PRESSURE: 121 MMHG | BODY MASS INDEX: 37.56 KG/M2

## 2019-10-28 DIAGNOSIS — Z87.59 HISTORY OF PRETERM PREMATURE RUPTURE OF MEMBRANES (PPROM): ICD-10-CM

## 2019-10-28 DIAGNOSIS — Z31.69 ENCOUNTER FOR PRECONCEPTION CONSULTATION: Primary | ICD-10-CM

## 2019-10-28 DIAGNOSIS — F41.1 GENERALIZED ANXIETY DISORDER: ICD-10-CM

## 2019-10-28 DIAGNOSIS — Z87.51 HISTORY OF PRETERM DELIVERY: ICD-10-CM

## 2019-10-28 PROCEDURE — 99243 OFF/OP CNSLTJ NEW/EST LOW 30: CPT | Performed by: OBSTETRICS & GYNECOLOGY

## 2019-10-28 NOTE — LETTER
2019     Linda Saunders MD  [address]    Patient: Cecilia Hernandez   YOB: 1990   Date of Visit: 10/28/2019       Dear Dr Tony Hightower: Thank you for referring Cecilia Hernandez to me for evaluation  Below are my notes for this consultation  If you have questions, please do not hesitate to call me  I look forward to following your patient along with you  Sincerely,        Gabrielle Estrada MD        CC: Corinne Party, DO Lilly Dudley, MD  2019 11:08 AM  Sign at close encounter  PRECONCEPTION CONSULTATION: MATERNAL-FETAL MEDICINE    Referring physician:   Linda Saunders Md  [address] [facility] ([place], [place]), [facility] [address]    Reason for consultation:   Chief Complaint   Patient presents with    Consult     followup after recent midtrimester loss, wants to know what can be done next time (if anything) to prevent it from happening again  Dear Dr Tony Hightower,    Thank you for referring patient Cecilia Hernandez for preconception Maternal-Fetal Medicine consultation regarding recent midtrimester loss  Estephanie Fountain is familiar to me from early in her recent pregnancy  As you know, she is a 30y o  year-old para     Her history is as follows:    Past Medical History:   Diagnosis Date    Alcohol abuse     in past    Anxiety     Arthritis     bilateral hips    Cyst of breast, left     Depression     Lump of left breast     LAST ASSESSED: 17    Varicella      Past Surgical History:   Procedure Laterality Date    DE INDUCED ABORTN BY DIL/EVAC N/A 2019    Procedure: DILATATION AND EVACUATION (D&E) 15 Weeks;  Surgeon: Linda Saunders MD;  Location:  MAIN OR;  Service: Gynecology    TONSILLECTOMY       OB History    Para Term  AB Living   1 0 0 0 1 0   SAB TAB Ectopic Multiple Live Births   1 0 0 0 0      # Outcome Date GA Lbr Geovany/2nd Weight Sex Delivery Anes PTL Lv   1 SAB 19 15w4d       ND      Birth Comments: bleeding and PPROM at 14w6d; had D&E; initial presentation was with heavy vaginal bleeding, and the next morning she experienced PPROM  Gynecologic history: Patient's last menstrual period was 10/23/2019  This was a short cycle though normal cycles are 5 days long occurring every 28 days      Social History     Socioeconomic History    Marital status: Single     Spouse name: Not on file    Number of children: Not on file    Years of education: Not on file    Highest education level: Not on file   Occupational History    Occupation: FULL-TIME EMPLOYMENT   Social Needs    Financial resource strain: Not on file    Food insecurity:     Worry: Not on file     Inability: Not on file    Transportation needs:     Medical: Not on file     Non-medical: Not on file   Tobacco Use    Smoking status: Former Smoker     Last attempt to quit: 2019     Years since quittin 3    Smokeless tobacco: Never Used    Tobacco comment: Quit   Substance and Sexual Activity    Alcohol use: No     Comment: sober 7 years    Drug use: Not Currently     Types: Marijuana     Comment: quit with pregnancy/ was using once a day prior    Sexual activity: Yes     Partners: Male     Birth control/protection: None   Lifestyle    Physical activity:     Days per week: Not on file     Minutes per session: Not on file    Stress: Not on file   Relationships    Social connections:     Talks on phone: Not on file     Gets together: Not on file     Attends Religion service: Not on file     Active member of club or organization: Not on file     Attends meetings of clubs or organizations: Not on file     Relationship status: Not on file    Intimate partner violence:     Fear of current or ex partner: Not on file     Emotionally abused: Not on file     Physically abused: Not on file     Forced sexual activity: Not on file   Other Topics Concern    Not on file   Social History Narrative    DAILY [employer], 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     Family History   Problem Relation Age of Onset    No Known Problems Mother     Substance Abuse Father         father  in  [place])    Alcohol abuse Father     Coronary artery disease Paternal Grandfather     Arthritis Paternal Grandfather     Alcohol abuse Other     Depression Other     Substance Abuse Other     Arthritis Family     No Known Problems Sister     No Known Problems Brother     No Known Problems Maternal Grandmother        Current Outpatient Medications:     Magnesium 250 MG TABS, Take 1 tablet by mouth daily, Disp: , Rfl:     melatonin 1 mg, Take 10 mg by mouth daily at bedtime , Disp: , Rfl:     Prenatal MV & Min w/FA-DHA (PRENATAL ADULT GUMMY/DHA/FA PO), Take by mouth, Disp: , Rfl:     sertraline (ZOLOFT) 50 mg tablet, Take 1 tablet (50 mg total) by mouth daily, Disp: 45 tablet, Rfl: 5    No Known Allergies    Exam:  Vitals: Blood pressure 121/85, pulse 64, height 5' 10" (1 778 m), weight 119 kg (262 lb 6 4 oz), last menstrual period 10/23/2019, not currently breastfeeding  General appearance: alert, well appearing, and in no distress, oriented to person, place, and time, acyanotic, in no respiratory distress; appropriately tearful at times  The remainder of her physical examination was deferred as she was here today for consultation and discussion  Assessment and Plan: Ms Efren Carrel is a 30y o  year-old para  here for preconception counseling  By issue:    Problem List Items Addressed This Visit        Other    Generalized anxiety disorder    Relevant Medications    sertraline (ZOLOFT) 50 mg tablet    Encounter for preconception consultation - Primary     Discussed that 50% of the pregnancies in this country are unplanned and advised barrier methods at this time as well as reinitiation of prenatal vitamins  Regarding interpregnancy interval advised 6 months given probable component of cervical insuffiiency    If she cannot wait 6 months then advised at least 3 months  Acknowledged uncertainty in recommendations in interpregnancy interval timing given not a straightforward etiology to her midtrimester loss  History of  delivery     We discussed her history of midtrimester loss and that the etiology is not straightforward and singular, given components of abruption (heavy bleeding), PPROM (leaking fluid) and probable some component of cervical insufficiency  We discussed that her delivery prior to 16 weeks, while from a standpoint of traditional -parity classification, would not qualify her for [facility], however we also discussed that very recently published study (PROLONG) questions the utility of this medication  We discussed that the timing of her loss occurred prior to the typical initiation of serial cervical length surveillance when patients have a prior  birth (we typically initiate surveillance at 16 weeks)  For a subsequent pregnancy should she choose to undertake one, there are multiple acceptable options in terms of management  A history-indicated cerclage (placed at 12-14 weeks) is an option though I did not strongly recommend this  Cervical surveillance (q2 weeks beginning at 16-20 weeks, not continuing past 24 weeks) with the option to (but not strong recommendation to) take a baseline measurement prior to 16 weeks  [facility] may be off the market soon pending impact of recent PROLONG study, and also as her delivery was prior 18 weeks it would not be covered  Vaginal progesterone is an option, either prophylactically or if her cervix shortens  I recommend that her care be individualized in a subsequent pregnancy, again given early gestational age at which her delivery occurred  I would again recommend aspirin prophylaxis given reduced risk of abruption             Other Visit Diagnoses     History of  premature rupture of membranes (PPROM)            A total of 40 minutes were spent in this encounter with >50% of the time spent in face-to-face counseling and in coordination of care  We should see her back once pregnant or sooner should she have any additional questions  At the conclusion of today's encounter, all questions were answered to her satisfaction  Thank you very much for this kind referral and please do not hesitate to contact me with any further questions or concerns      Sincerely,    Liza Masterson MD  Attending Physician, [place]

## 2019-10-28 NOTE — LETTER
Name: Ari  Name: Ari  Name: Ari   MRN: [id number] MRN: [id number] MRN: [id number]  : [birth year]  Date: Date: Date:  GA: GA: GA:     Name: Ari  Name: Ari  Name: Ari   MRN: [id number] MRN: [id number] MRN: [id number]  : [birth year]  Date: Date: Date:  GA: GA: GA:     Name: Ari  Name: Ari  Name: Ari Guillen  MRN: [id number] MRN: [id number] MRN: [id number]  : [birth year]  Date: Date: Date:  GA: GA: GA:     Name: Ari  Name: Ari  Name: Ari   MRN: [id number] MRN: [id number] MRN: [id number]  : [birth year] : 1990  Date: Date: Date:  GA: GA: GA:     Name: Ari  Name: Ari  Name: Ari Guillen  MRN: [id number] MRN: [id number] MRN: [id number]  : [birth year] : 1990  Date: Date: Date:  GA: GA: GA:     Name: Ari  Name: Ari  Name: Ari   MRN: [id number] MRN: [id number] MRN: [id number]  : [birth year] : 1990  Date: Date: Date:  GA: GA: GA:     Name: Arijovita Luceros Name: Ari  Name: Ari Guillen  MRN: [id number] MRN: [id number] MRN: [id number]  : 1990 : 1990 : 1990  Date: Date: Date:  GA: GA: GA:     Name: Ari  Name: Ari Guillen Name: Ari Guillen  MRN: [id number] MRN: [id number] MRN: [id number]  : 1990 : 1990 : 1990  Date: Date: Date:  GA: GA: GA:     Name: Ari Guillen Name: Ari Guillen Name: Ari Guillen  MRN: [id number] MRN: [id number] MRN: [id number]  : 1990 : 1990 : 1990  Date: Date: Date:  GA: GA: GA:     Name: Ari  Name: Ari Guillen Name: Ari Guillen  MRN: [id number] MRN: [id number] MRN: [id number]  : 1990 : 1990 : 1990  Date: Date: Date:  GA: GA: GA:   Name: Ari  Name: Ari  Name: Ari Guillen  MRN: [id number] MRN: [id number] MRN: [id number]  : [birth year] : 1990  Date: Date: Date:  GA: GA: GA:

## 2019-10-29 PROBLEM — Z31.69 ENCOUNTER FOR PRECONCEPTION CONSULTATION: Status: ACTIVE | Noted: 2019-10-29

## 2019-10-29 PROBLEM — Z87.51 HISTORY OF PRETERM DELIVERY: Status: ACTIVE | Noted: 2019-10-29

## 2019-10-29 NOTE — ASSESSMENT & PLAN NOTE
Discussed that 50% of the pregnancies in this country are unplanned and advised barrier methods at this time as well as reinitiation of prenatal vitamins  Regarding interpregnancy interval advised 6 months given probable component of cervical insuffiiency  If she cannot wait 6 months then advised at least 3 months  Acknowledged uncertainty in recommendations in interpregnancy interval timing given not a straightforward etiology to her midtrimester loss

## 2019-10-29 NOTE — ASSESSMENT & PLAN NOTE
We discussed her history of midtrimester loss and that the etiology is not straightforward and singular, given components of abruption (heavy bleeding), PPROM (leaking fluid) and probable some component of cervical insufficiency  We discussed that her delivery prior to 16 weeks, while from a standpoint of traditional -parity classification, would not qualify her for [facility], however we also discussed that very recently published study (PROLONG) questions the utility of this medication  We discussed that the timing of her loss occurred prior to the typical initiation of serial cervical length surveillance when patients have a prior  birth (we typically initiate surveillance at 16 weeks)  For a subsequent pregnancy should she choose to undertake one, there are multiple acceptable options in terms of management  A history-indicated cerclage (placed at 12-14 weeks) is an option though I did not strongly recommend this  Cervical surveillance (q2 weeks beginning at 16-20 weeks, not continuing past 24 weeks) with the option to (but not strong recommendation to) take a baseline measurement prior to 16 weeks  [facility] may be off the market soon pending impact of recent PROLONG study, and also as her delivery was prior 18 weeks it would not be covered  Vaginal progesterone is an option, either prophylactically or if her cervix shortens  I recommend that her care be individualized in a subsequent pregnancy, again given early gestational age at which her delivery occurred  I would again recommend aspirin prophylaxis given reduced risk of abruption

## 2020-03-13 ENCOUNTER — OFFICE VISIT (OUTPATIENT)
Dept: OBGYN CLINIC | Facility: CLINIC | Age: 30
End: 2020-03-13
Payer: COMMERCIAL

## 2020-03-13 VITALS
SYSTOLIC BLOOD PRESSURE: 120 MMHG | BODY MASS INDEX: 39.6 KG/M2 | WEIGHT: 276.6 LBS | DIASTOLIC BLOOD PRESSURE: 86 MMHG | HEIGHT: 70 IN

## 2020-03-13 DIAGNOSIS — O03.9 SAB (SPONTANEOUS ABORTION): Primary | ICD-10-CM

## 2020-03-13 PROCEDURE — 1036F TOBACCO NON-USER: CPT | Performed by: OBSTETRICS & GYNECOLOGY

## 2020-03-13 PROCEDURE — 99214 OFFICE O/P EST MOD 30 MIN: CPT | Performed by: OBSTETRICS & GYNECOLOGY

## 2020-03-13 RX ORDER — MEDROXYPROGESTERONE ACETATE 10 MG/1
10 TABLET ORAL DAILY
Qty: 30 TABLET | Refills: 3 | Status: SHIPPED | OUTPATIENT
Start: 2020-03-13 | End: 2020-05-06 | Stop reason: SDUPTHER

## 2020-03-13 NOTE — PATIENT INSTRUCTIONS
If you have any questions feel free to email me at this address [email or web address], send a message through [address], or call the office      The first day of bleeding is labeled Day 1  Take Letrozole on days 5-9 of your cycle  You may have bloating, cramping, nausea due to the stimulation of your ovaries  Usually ovulation will then take place around day 14  Plan to have intercourse days 10-16 at least alternate days  Please go to the lab on Day 21 for a progesterone level  This will tell me if you have ovulated  If no menses by day 30 take a pregnancy test   If positive call the office for appointment scheduling  If negative and no menses- wait until day 35  If still no menses and pregnancy test is negative call the office  If you get a period, the first day of bleeding is Day 1 and the cycle restarts      Please contact me with any questions,    Trish Easton MD

## 2020-03-16 LAB
APTT SCREEN TO CONFIRM RATIO: 0.87 RATIO (ref 0–1.4)
AT III ACT/NOR PPP CHRO: 90 % (ref 75–135)
BASOPHILS # BLD AUTO: 0 X10E3/UL (ref 0–0.2)
BASOPHILS NFR BLD AUTO: 0 %
CARDIOLIPIN IGG SER IA-ACNC: <9 GPL U/ML (ref 0–14)
CARDIOLIPIN IGM SER IA-ACNC: <9 MPL U/ML (ref 0–12)
CONFIRM APTT/NORMAL: 34.5 SEC (ref 0–55)
EOSINOPHIL # BLD AUTO: 0.1 X10E3/UL (ref 0–0.4)
EOSINOPHIL NFR BLD AUTO: 1 %
ERYTHROCYTE [DISTWIDTH] IN BLOOD BY AUTOMATED COUNT: 14.8 % (ref 11.7–15.4)
HCT VFR BLD AUTO: 38.2 % (ref 34–46.6)
HGB BLD-MCNC: 13.1 G/DL (ref 11.1–15.9)
IMM GRANULOCYTES # BLD: 0 X10E3/UL (ref 0–0.1)
IMM GRANULOCYTES NFR BLD: 0 %
LA PPP-IMP: NORMAL
LYMPHOCYTES # BLD AUTO: 2.2 X10E3/UL (ref 0.7–3.1)
LYMPHOCYTES NFR BLD AUTO: 38 %
MCH RBC QN AUTO: 28.9 PG (ref 26.6–33)
MCHC RBC AUTO-ENTMCNC: 34.3 G/DL (ref 31.5–35.7)
MCV RBC AUTO: 84 FL (ref 79–97)
MONOCYTES # BLD AUTO: 0.6 X10E3/UL (ref 0.1–0.9)
MONOCYTES NFR BLD AUTO: 10 %
NEUTROPHILS # BLD AUTO: 3 X10E3/UL (ref 1.4–7)
NEUTROPHILS NFR BLD AUTO: 51 %
PLATELET # BLD AUTO: 292 X10E3/UL (ref 150–450)
RBC # BLD AUTO: 4.53 X10E6/UL (ref 3.77–5.28)
SCREEN APTT: 34.7 SEC (ref 0–51.9)
SCREEN DRVVT: 26.8 SEC (ref 0–47)
THROMBIN TIME: 17.3 SEC (ref 0–23)
TSH SERPL DL<=0.005 MIU/L-ACNC: 0.88 UIU/ML (ref 0.45–4.5)
WBC # BLD AUTO: 5.8 X10E3/UL (ref 3.4–10.8)

## 2020-03-16 NOTE — PROGRESS NOTES
A/P    1   16 week loss     Discussed options from Precounceptiual counseling and MFM notes prior      Unlikely cervical incompetance - she had PPROM - and was still closed x 3 days with induction to follow     The pathology of chorio- unsure if infection or PPROM came first since she was PPROM x 3 days prior to delivery     APS - unknown - recommened by MFM when diagnosied with PPROM testing ordered pt will do       2  Ammenorrhea -   Provera chalange    Will see  How she does if she has cycle return to normal    If not cycle and start letrazole the following month allowing time for labs to come back     3  Obesity -    Over 50 pounds gained since the start of the pregnancy and the lost   Advised to start working on that will help with reproduction       4  Change with the next cycle    Asa    No need for stitch or progesterone   Could measure cervix but prob will not add anything    Counseling and coordination of care     I have spent 30 minutes  with patient today more than 50% in counseling and coordinating care        Patient was counseled regarding  Loss testing and next step   Went over in detail the plan for and against different treatments  Pt shows understanding and is glad to happy to start next pregnancy

## 2020-03-30 ENCOUNTER — TELEPHONE (OUTPATIENT)
Dept: OBGYN CLINIC | Facility: CLINIC | Age: 30
End: 2020-03-30

## 2020-03-31 DIAGNOSIS — N97.0 INFERTILITY ASSOCIATED WITH ANOVULATION: Primary | ICD-10-CM

## 2020-03-31 RX ORDER — LETROZOLE 2.5 MG/1
2.5 TABLET, FILM COATED ORAL DAILY
Qty: 5 TABLET | Refills: 3 | Status: SHIPPED | OUTPATIENT
Start: 2020-03-31 | End: 2020-05-06 | Stop reason: SDUPTHER

## 2020-04-21 ENCOUNTER — TELEPHONE (OUTPATIENT)
Dept: OBGYN CLINIC | Facility: CLINIC | Age: 30
End: 2020-04-21

## 2020-04-21 DIAGNOSIS — N97.0 INFERTILITY ASSOCIATED WITH ANOVULATION: Primary | ICD-10-CM

## 2020-05-06 ENCOUNTER — TELEPHONE (OUTPATIENT)
Dept: OBGYN CLINIC | Facility: CLINIC | Age: 30
End: 2020-05-06

## 2020-05-06 ENCOUNTER — TELEMEDICINE (OUTPATIENT)
Dept: FAMILY MEDICINE CLINIC | Facility: HOSPITAL | Age: 30
End: 2020-05-06
Payer: COMMERCIAL

## 2020-05-06 VITALS — WEIGHT: 260 LBS | HEART RATE: 78 BPM | BODY MASS INDEX: 37.22 KG/M2 | HEIGHT: 70 IN

## 2020-05-06 DIAGNOSIS — E66.09 CLASS 2 OBESITY DUE TO EXCESS CALORIES WITHOUT SERIOUS COMORBIDITY WITH BODY MASS INDEX (BMI) OF 37.0 TO 37.9 IN ADULT: ICD-10-CM

## 2020-05-06 DIAGNOSIS — F41.1 GENERALIZED ANXIETY DISORDER: Primary | ICD-10-CM

## 2020-05-06 DIAGNOSIS — N97.0 INFERTILITY ASSOCIATED WITH ANOVULATION: ICD-10-CM

## 2020-05-06 DIAGNOSIS — O03.9 SAB (SPONTANEOUS ABORTION): ICD-10-CM

## 2020-05-06 DIAGNOSIS — F98.8 ADD (ATTENTION DEFICIT DISORDER) WITHOUT HYPERACTIVITY: ICD-10-CM

## 2020-05-06 PROCEDURE — 99213 OFFICE O/P EST LOW 20 MIN: CPT | Performed by: NURSE PRACTITIONER

## 2020-05-06 RX ORDER — MEDROXYPROGESTERONE ACETATE 10 MG/1
10 TABLET ORAL DAILY
Qty: 30 TABLET | Refills: 3 | Status: SHIPPED | OUTPATIENT
Start: 2020-05-06 | End: 2020-09-16 | Stop reason: ALTCHOICE

## 2020-05-06 RX ORDER — LETROZOLE 2.5 MG/1
2.5 TABLET, FILM COATED ORAL DAILY
Qty: 5 TABLET | Refills: 3 | Status: SHIPPED | OUTPATIENT
Start: 2020-05-06 | End: 2020-09-16 | Stop reason: ALTCHOICE

## 2020-05-09 ENCOUNTER — TELEPHONE (OUTPATIENT)
Dept: OTHER | Facility: OTHER | Age: 30
End: 2020-05-09

## 2020-05-09 ENCOUNTER — DOCUMENTATION (OUTPATIENT)
Dept: OBGYN CLINIC | Facility: CLINIC | Age: 30
End: 2020-05-09

## 2020-05-26 ENCOUNTER — TELEPHONE (OUTPATIENT)
Dept: OBGYN CLINIC | Facility: CLINIC | Age: 30
End: 2020-05-26

## 2020-05-27 ENCOUNTER — TELEPHONE (OUTPATIENT)
Dept: OBGYN CLINIC | Facility: CLINIC | Age: 30
End: 2020-05-27

## 2020-05-27 ENCOUNTER — TELEPHONE (OUTPATIENT)
Dept: LABOR AND DELIVERY | Facility: HOSPITAL | Age: 30
End: 2020-05-27

## 2020-05-28 ENCOUNTER — OFFICE VISIT (OUTPATIENT)
Dept: OBGYN CLINIC | Facility: CLINIC | Age: 30
End: 2020-05-28
Payer: COMMERCIAL

## 2020-05-28 VITALS
HEIGHT: 70 IN | DIASTOLIC BLOOD PRESSURE: 90 MMHG | WEIGHT: 275.8 LBS | BODY MASS INDEX: 39.48 KG/M2 | SYSTOLIC BLOOD PRESSURE: 130 MMHG

## 2020-05-28 DIAGNOSIS — N97.0 INFERTILITY ASSOCIATED WITH ANOVULATION: Primary | ICD-10-CM

## 2020-05-28 DIAGNOSIS — E66.01 CLASS 2 SEVERE OBESITY DUE TO EXCESS CALORIES WITH SERIOUS COMORBIDITY AND BODY MASS INDEX (BMI) OF 39.0 TO 39.9 IN ADULT (HCC): ICD-10-CM

## 2020-05-28 DIAGNOSIS — Z31.69 ENCOUNTER FOR PRECONCEPTION CONSULTATION: ICD-10-CM

## 2020-05-28 PROCEDURE — 3008F BODY MASS INDEX DOCD: CPT | Performed by: OBSTETRICS & GYNECOLOGY

## 2020-05-28 PROCEDURE — 99213 OFFICE O/P EST LOW 20 MIN: CPT | Performed by: OBSTETRICS & GYNECOLOGY

## 2020-05-28 PROCEDURE — 1036F TOBACCO NON-USER: CPT | Performed by: OBSTETRICS & GYNECOLOGY

## 2020-05-29 PROBLEM — N97.0 INFERTILITY ASSOCIATED WITH ANOVULATION: Status: ACTIVE | Noted: 2020-05-29

## 2020-05-29 PROBLEM — E66.9 CLASS 2 OBESITY IN ADULT: Status: ACTIVE | Noted: 2020-05-29

## 2020-05-29 PROBLEM — E66.812 CLASS 2 OBESITY IN ADULT: Status: ACTIVE | Noted: 2020-05-29

## 2020-05-29 PROBLEM — E28.2 PCOS (POLYCYSTIC OVARIAN SYNDROME): Status: ACTIVE | Noted: 2020-05-29

## 2020-07-15 ENCOUNTER — TELEPHONE (OUTPATIENT)
Dept: LABOR AND DELIVERY | Facility: HOSPITAL | Age: 30
End: 2020-07-15

## 2020-07-15 ENCOUNTER — TELEPHONE (OUTPATIENT)
Dept: OBGYN CLINIC | Facility: MEDICAL CENTER | Age: 30
End: 2020-07-15

## 2020-07-15 ENCOUNTER — TELEPHONE (OUTPATIENT)
Dept: OBGYN CLINIC | Facility: CLINIC | Age: 30
End: 2020-07-15

## 2020-07-15 DIAGNOSIS — N97.0 INFERTILITY ASSOCIATED WITH ANOVULATION: Primary | ICD-10-CM

## 2020-07-15 RX ORDER — LETROZOLE 2.5 MG/1
5 TABLET, FILM COATED ORAL DAILY
Qty: 10 TABLET | Refills: 3 | Status: SHIPPED | OUTPATIENT
Start: 2020-07-15 | End: 2020-09-16 | Stop reason: ALTCHOICE

## 2020-07-15 NOTE — TELEPHONE ENCOUNTER
Dr Francia Blackwell called inquiring about letrozole, asking if it needs to be increased  Pt does need a refill and wondering if it can be done today because she is going on vacation [facility]  Pharmacy on file confirmed

## 2020-08-31 ENCOUNTER — TELEPHONE (OUTPATIENT)
Dept: OBGYN CLINIC | Facility: MEDICAL CENTER | Age: 30
End: 2020-08-31

## 2020-08-31 NOTE — TELEPHONE ENCOUNTER
Pt called in has been bleeding for 4 days super heavy has no pain is on the 4th day of bleeding   Pt was spoken to

## 2020-09-01 ENCOUNTER — PATIENT MESSAGE (OUTPATIENT)
Dept: OBGYN CLINIC | Facility: CLINIC | Age: 30
End: 2020-09-01

## 2020-09-03 ENCOUNTER — TELEPHONE (OUTPATIENT)
Dept: OBGYN CLINIC | Facility: MEDICAL CENTER | Age: 30
End: 2020-09-03

## 2020-09-03 NOTE — TELEPHONE ENCOUNTER
Started to bleed heavy on 8/27- stopped now   Has apt with [address] on 9/14 to get the work up    Will cancel todays apt                        ----- Message from Dixie Byers sent at 9/1/2020  2:22 PM EDT -----  Regarding: FW: Non-Urgent Medical Question  Contact: [phone]    ----- Message -----  From: Javier Grijalva  Sent: 9/1/2020   1:10 PM EDT  To: [facility] Clinical  Subject: Non-Urgent Ivis Armijo tried to call the office a few times but maybe it's easier to reach you or someone on here  About two weeks ago I had what looked to be a faint positive line on a pregnancy test  A few days later I tested again and the line looked fainter and then a couple days after that, no line  There was no bleeding at that point so I figured maybe it was a faulty test and I was just trying to see a positive line on the fainter tests after that  I decided because I'm going to [facility] on the 14th to give myself a break from my usual hormone therapy for the month at that point so I didn't call the office for a refill on the Medroxyprogesterone that I normally take to get my period  About five days ago I began bleeding    at first I assumed I got my period on my own    but as I'm thinking about it and the way I was feeling before hand, I had massive headaches, some back pain, nausea, aversion to smells etc  I'm wondering if this was actually a chemical pregnancy that didn't take  I'm on day five with still a significant amount of bleeding, not much cramping anymore, but I figured if this isn't just a period it should probably be documented      Thanks,    Javier Grijalva

## 2020-09-16 ENCOUNTER — TELEPHONE (OUTPATIENT)
Dept: FAMILY MEDICINE CLINIC | Facility: HOSPITAL | Age: 30
End: 2020-09-16

## 2020-09-16 ENCOUNTER — TELEMEDICINE (OUTPATIENT)
Dept: FAMILY MEDICINE CLINIC | Facility: HOSPITAL | Age: 30
End: 2020-09-16
Payer: COMMERCIAL

## 2020-09-16 VITALS — BODY MASS INDEX: 38.65 KG/M2 | HEIGHT: 70 IN | HEART RATE: 78 BPM | WEIGHT: 270 LBS

## 2020-09-16 DIAGNOSIS — F33.0 MILD EPISODE OF RECURRENT MAJOR DEPRESSIVE DISORDER (HCC): ICD-10-CM

## 2020-09-16 DIAGNOSIS — F98.8 ADD (ATTENTION DEFICIT DISORDER) WITHOUT HYPERACTIVITY: ICD-10-CM

## 2020-09-16 DIAGNOSIS — F41.1 GENERALIZED ANXIETY DISORDER: Primary | ICD-10-CM

## 2020-09-16 PROCEDURE — 99214 OFFICE O/P EST MOD 30 MIN: CPT | Performed by: NURSE PRACTITIONER

## 2020-09-16 RX ORDER — SERTRALINE HYDROCHLORIDE 25 MG/1
25 TABLET, FILM COATED ORAL DAILY
Qty: 30 TABLET | Refills: 1 | Status: SHIPPED | OUTPATIENT
Start: 2020-09-16 | End: 2020-10-14 | Stop reason: SDUPTHER

## 2020-09-16 NOTE — TELEPHONE ENCOUNTER
Patient taking a break from fertility treatments and pregnancy attempts  Was advised to contact pcp to restart her vyvanse and sertraline  Do you want to see her to restart these?   Virtual or in person?    pcb

## 2020-09-16 NOTE — PROGRESS NOTES
Virtual Regular Visit      Assessment/Plan:    Problem List Items Addressed This Visit        Other    ADD (attention deficit disorder) without hyperactivity     Requesting to go back on Vyvanse  Previously diagnosed with ADHD and did well with Vyvanse compared to stimulant medication  Will restart at past 60 mg dose  F/U in 4 weeks  Relevant Medications    sertraline (ZOLOFT) 25 mg tablet    lisdexamfetamine (VYVANSE) 60 MG capsule    Generalized anxiety disorder - Primary     Uncontrolled off sertraline  Requesting to go back on at 25 mg dose (this was effective in past)  F/U in 4 weeks  Relevant Medications    sertraline (ZOLOFT) 25 mg tablet    lisdexamfetamine (VYVANSE) 60 MG capsule    Other Relevant Orders    Ambulatory referral to behavioral health therapists    Mild episode of recurrent major depressive disorder ([facility] [address] )     Worsening depression off sertraline and going through unsuccessful fertility treatments and miscarriages  Willing to restart sertraline  Wants to start therapy  Refer to [facility]  F/U in 4 weeks  Relevant Medications    sertraline (ZOLOFT) 25 mg tablet    lisdexamfetamine (VYVANSE) 60 MG capsule    Other Relevant Orders    Ambulatory referral to behavioral health therapists               Reason for visit is   Chief Complaint   Patient presents with    Follow-up    Virtual Regular Visit        Encounter provider [facility]    Provider located at [address] [place] [address]  [address] [place] [address]      Recent Visits  No visits were found meeting these conditions     Showing recent visits within past 7 days and meeting all other requirements     Today's Visits  Date Type Provider Dept   09/16/20 Telemedicine [facility] Leger Pg, Md   09/16/20 Telephone Afshin Tellez Md   Showing today's visits and meeting all other requirements     Future Appointments  No visits were found meeting these conditions  Showing future appointments within next 150 days and meeting all other requirements        The patient was identified by name and date of birth  Chen Lindsey was informed that this is a telemedicine visit and that the visit is being conducted through [employer] and patient was informed that this is not a secure, HIPAA-complaint platform  She agrees to proceed     My office door was closed  No one else was in the room  She acknowledged consent and understanding of privacy and security of the video platform  The patient has agreed to participate and understands they can discontinue the visit at any time  Patient is aware this is a billable service  Subjective  Chen Lindsey is a 35 y o  female    Wants to get back on sertraline and Vyvanse  Stopped Vyvanse over 1 year ago when she was pregnant  Had miscarriage but did not want to restart because she was planning another pregnancy  Continued with sertraline until about 3 months ago  Stopped abruptly  Feeling more depressed, unmotivated  Has had 2 miscarriages and unsuccessful fertility treatments  Feels worthless because of this  Wants to restart sertraline and get mood back on track before she pursues any further fertility treatments  Difficulty completing tasks  Had been on Vyvanse at 60 mg previously with good control  Stops and starts projects  Avoiding projects          Past Medical History:   Diagnosis Date    Alcohol abuse     in past    Anxiety     Arthritis     bilateral hips    Cyst of breast, left     Depression     Lump of left breast     LAST ASSESSED: 4/13/17    Varicella        Past Surgical History:   Procedure Laterality Date    MN INDUCED ABORTN BY DIL/EVAC N/A 9/18/2019    Procedure: DILATATION AND EVACUATION (D&E) 15 Weeks;  Surgeon: Goldie Giang MD;  Location: [facility];  Service: Gynecology    TONSILLECTOMY         Current Outpatient Medications   Medication Sig Dispense Refill    Magnesium 250 MG TABS Take 1 tablet by mouth daily      melatonin 1 mg Take 20 mg by mouth daily at bedtime       Prenatal MV & Min w/FA-DHA (PRENATAL ADULT GUMMY/DHA/FA PO) Take by mouth      sertraline (ZOLOFT) 25 mg tablet Take 1 tablet (25 mg total) by mouth daily 30 tablet 1    lisdexamfetamine (VYVANSE) 60 MG capsule Take 1 capsule (60 mg total) by mouth every morningMax Daily Amount: 60 mg 30 capsule 0     No current facility-administered medications for this visit  No Known Allergies    Review of Systems   Constitutional: Positive for unexpected weight change  Psychiatric/Behavioral: Positive for agitation, decreased concentration and dysphoric mood  Negative for sleep disturbance and suicidal ideas  The patient is nervous/anxious  Video Exam    Vitals:    09/16/20 1317   Pulse: 78   Weight: 122 kg (270 lb)   Height: 5' 10" (1 778 m)       Physical Exam  Vitals signs reviewed  Constitutional:       Appearance: Normal appearance  Pulmonary:      Effort: Pulmonary effort is normal    Neurological:      Mental Status: She is alert and oriented to person, place, and time  Psychiatric:         Mood and Affect: Mood normal          Behavior: Behavior normal          Thought Content: Thought content normal          Judgment: Judgment normal           I spent 20 minutes directly with the patient during this visit      VIRTUAL VISIT DISCLAIMER    Tonia Mcnallyjabari acknowledges that she has consented to an online visit or consultation  She understands that the online visit is based solely on information provided by her, and that, in the absence of a face-to-face physical evaluation by the physician, the diagnosis she receives is both limited and provisional in terms of accuracy and completeness  This is not intended to replace a full medical face-to-face evaluation by the physician  Tonia William understands and accepts these terms

## 2020-09-16 NOTE — ASSESSMENT & PLAN NOTE
Worsening depression off sertraline and going through unsuccessful fertility treatments and miscarriages  Willing to restart sertraline  Wants to start therapy  Refer to Stacey  F/XIOMARA in 4 weeks

## 2020-09-16 NOTE — ASSESSMENT & PLAN NOTE
Uncontrolled off sertraline  Requesting to go back on at 25 mg dose (this was effective in past)  F/U in 4 weeks

## 2020-09-16 NOTE — ASSESSMENT & PLAN NOTE
Requesting to go back on Vyvanse  Previously diagnosed with ADHD and did well with Vyvanse compared to stimulant medication  Will restart at past 60 mg dose  F/U in 4 weeks

## 2020-09-17 ENCOUNTER — TELEPHONE (OUTPATIENT)
Dept: BEHAVIORAL/MENTAL HEALTH CLINIC | Facility: CLINIC | Age: 30
End: 2020-09-17

## 2020-09-17 NOTE — TELEPHONE ENCOUNTER
This writer outreached to [place] at the request of [facility] Snyder to schedule an in-take assessment for [employer]  Left a voice-mail requesting that she call the office back to schedule an apt

## 2020-10-12 LAB
EXTERNAL HIV SCREEN: NORMAL
HBA1C MFR BLD HPLC: 5.3 %
HCV AB SER-ACNC: 0.1

## 2020-10-14 ENCOUNTER — TELEMEDICINE (OUTPATIENT)
Dept: FAMILY MEDICINE CLINIC | Facility: HOSPITAL | Age: 30
End: 2020-10-14
Payer: COMMERCIAL

## 2020-10-14 VITALS — HEIGHT: 70 IN | BODY MASS INDEX: 37.22 KG/M2 | WEIGHT: 260 LBS | HEART RATE: 82 BPM

## 2020-10-14 DIAGNOSIS — F98.8 ADD (ATTENTION DEFICIT DISORDER) WITHOUT HYPERACTIVITY: ICD-10-CM

## 2020-10-14 DIAGNOSIS — F41.1 GENERALIZED ANXIETY DISORDER: ICD-10-CM

## 2020-10-14 PROCEDURE — 3725F SCREEN DEPRESSION PERFORMED: CPT | Performed by: NURSE PRACTITIONER

## 2020-10-14 PROCEDURE — 99213 OFFICE O/P EST LOW 20 MIN: CPT | Performed by: NURSE PRACTITIONER

## 2020-10-14 PROCEDURE — 1036F TOBACCO NON-USER: CPT | Performed by: NURSE PRACTITIONER

## 2020-10-14 RX ORDER — SERTRALINE HYDROCHLORIDE 25 MG/1
25 TABLET, FILM COATED ORAL DAILY
Qty: 30 TABLET | Refills: 2 | Status: SHIPPED | OUTPATIENT
Start: 2020-10-14 | End: 2020-11-12 | Stop reason: SDUPTHER

## 2020-11-10 ENCOUNTER — TELEMEDICINE (OUTPATIENT)
Dept: FAMILY MEDICINE CLINIC | Facility: HOSPITAL | Age: 30
End: 2020-11-10
Payer: COMMERCIAL

## 2020-11-10 VITALS — BODY MASS INDEX: 36.51 KG/M2 | HEIGHT: 70 IN | WEIGHT: 255 LBS

## 2020-11-10 DIAGNOSIS — Z20.828 EXPOSURE TO SARS-ASSOCIATED CORONAVIRUS: Primary | ICD-10-CM

## 2020-11-10 DIAGNOSIS — Z20.828 EXPOSURE TO SARS-ASSOCIATED CORONAVIRUS: ICD-10-CM

## 2020-11-10 PROCEDURE — U0003 INFECTIOUS AGENT DETECTION BY NUCLEIC ACID (DNA OR RNA); SEVERE ACUTE RESPIRATORY SYNDROME CORONAVIRUS 2 (SARS-COV-2) (CORONAVIRUS DISEASE [COVID-19]), AMPLIFIED PROBE TECHNIQUE, MAKING USE OF HIGH THROUGHPUT TECHNOLOGIES AS DESCRIBED BY CMS-2020-01-R: HCPCS | Performed by: NURSE PRACTITIONER

## 2020-11-10 PROCEDURE — 99214 OFFICE O/P EST MOD 30 MIN: CPT | Performed by: NURSE PRACTITIONER

## 2020-11-10 RX ORDER — LORATADINE 10 MG/1
10 TABLET ORAL DAILY
COMMUNITY

## 2020-11-10 RX ORDER — OMEGA-3S/DHA/EPA/FISH OIL/D3 300MG-1000
400 CAPSULE ORAL DAILY
COMMUNITY

## 2020-11-11 LAB — SARS-COV-2 RNA SPEC QL NAA+PROBE: DETECTED

## 2020-11-12 ENCOUNTER — TELEMEDICINE (OUTPATIENT)
Dept: FAMILY MEDICINE CLINIC | Facility: HOSPITAL | Age: 30
End: 2020-11-12
Payer: COMMERCIAL

## 2020-11-12 VITALS — WEIGHT: 255 LBS | HEIGHT: 70 IN | TEMPERATURE: 99 F | BODY MASS INDEX: 36.51 KG/M2

## 2020-11-12 DIAGNOSIS — U07.1 COVID-19 VIRUS INFECTION: Primary | ICD-10-CM

## 2020-11-12 DIAGNOSIS — F98.8 ADD (ATTENTION DEFICIT DISORDER) WITHOUT HYPERACTIVITY: ICD-10-CM

## 2020-11-12 DIAGNOSIS — F41.1 GENERALIZED ANXIETY DISORDER: ICD-10-CM

## 2020-11-12 PROCEDURE — 99213 OFFICE O/P EST LOW 20 MIN: CPT | Performed by: NURSE PRACTITIONER

## 2020-11-12 RX ORDER — SERTRALINE HYDROCHLORIDE 25 MG/1
25 TABLET, FILM COATED ORAL DAILY
Qty: 30 TABLET | Refills: 2 | Status: SHIPPED | OUTPATIENT
Start: 2020-11-12 | End: 2021-02-10

## 2020-11-16 ENCOUNTER — TELEMEDICINE (OUTPATIENT)
Dept: FAMILY MEDICINE CLINIC | Facility: HOSPITAL | Age: 30
End: 2020-11-16
Payer: COMMERCIAL

## 2020-11-16 VITALS — HEIGHT: 70 IN | BODY MASS INDEX: 36.51 KG/M2 | WEIGHT: 255 LBS

## 2020-11-16 DIAGNOSIS — U07.1 COVID-19 VIRUS INFECTION: Primary | ICD-10-CM

## 2020-11-16 PROCEDURE — 3008F BODY MASS INDEX DOCD: CPT | Performed by: NURSE PRACTITIONER

## 2020-11-16 PROCEDURE — 99213 OFFICE O/P EST LOW 20 MIN: CPT | Performed by: NURSE PRACTITIONER

## 2020-11-16 PROCEDURE — 1036F TOBACCO NON-USER: CPT | Performed by: NURSE PRACTITIONER

## 2020-12-21 DIAGNOSIS — F98.8 ADD (ATTENTION DEFICIT DISORDER) WITHOUT HYPERACTIVITY: ICD-10-CM

## 2021-01-22 DIAGNOSIS — F98.8 ADD (ATTENTION DEFICIT DISORDER) WITHOUT HYPERACTIVITY: ICD-10-CM

## 2021-02-03 ENCOUNTER — TELEPHONE (OUTPATIENT)
Dept: FAMILY MEDICINE CLINIC | Facility: HOSPITAL | Age: 31
End: 2021-02-03

## 2021-02-09 DIAGNOSIS — F41.1 GENERALIZED ANXIETY DISORDER: ICD-10-CM

## 2021-02-10 RX ORDER — SERTRALINE HYDROCHLORIDE 25 MG/1
TABLET, FILM COATED ORAL
Qty: 90 TABLET | Refills: 0 | Status: SHIPPED | OUTPATIENT
Start: 2021-02-10 | End: 2021-05-06

## 2021-02-24 DIAGNOSIS — F98.8 ADD (ATTENTION DEFICIT DISORDER) WITHOUT HYPERACTIVITY: ICD-10-CM

## 2021-03-14 ENCOUNTER — OFFICE VISIT (OUTPATIENT)
Dept: URGENT CARE | Facility: CLINIC | Age: 31
End: 2021-03-14
Payer: COMMERCIAL

## 2021-03-14 VITALS
SYSTOLIC BLOOD PRESSURE: 120 MMHG | BODY MASS INDEX: 35.67 KG/M2 | OXYGEN SATURATION: 100 % | HEIGHT: 71 IN | HEART RATE: 98 BPM | DIASTOLIC BLOOD PRESSURE: 78 MMHG | RESPIRATION RATE: 16 BRPM | TEMPERATURE: 98.5 F | WEIGHT: 254.8 LBS

## 2021-03-14 DIAGNOSIS — S91.132A PUNCTURE WOUND OF GREAT TOE OF LEFT FOOT, INITIAL ENCOUNTER: Primary | ICD-10-CM

## 2021-03-14 PROCEDURE — 99213 OFFICE O/P EST LOW 20 MIN: CPT | Performed by: PREVENTIVE MEDICINE

## 2021-03-14 RX ORDER — CEPHALEXIN 500 MG/1
500 CAPSULE ORAL EVERY 12 HOURS SCHEDULED
Qty: 10 CAPSULE | Refills: 0 | Status: SHIPPED | OUTPATIENT
Start: 2021-03-14 | End: 2021-03-19

## 2021-03-14 RX ORDER — CETIRIZINE HYDROCHLORIDE 10 MG/1
10 TABLET ORAL DAILY
COMMUNITY
End: 2021-08-24

## 2021-03-14 NOTE — PROGRESS NOTES
[facility]        NAME: Aleah Umanzor is a 35 y o  female  : 1990    MRN: [id number]  DATE: 2021  TIME: 11:46 AM    Assessment and Plan   Puncture wound of great toe of left foot, initial encounter [S91 132A]  1  Puncture wound of great toe of left foot, initial encounter  cephalexin (KEFLEX) 500 mg capsule         Patient Instructions       Follow up with PCP in 3-5 days  Proceed to  ER if symptoms worsen  Chief Complaint     Chief Complaint   Patient presents with    Puncture Wound     onset yesterday- wearing sneakers and stepped on a board with a nail  Nail went through sneaker into great left toe  History of Present Illness        They are renovating home when she stepped on a nail with her left great toe  Review of Systems   Review of Systems   Skin: Positive for wound           Current Medications       Current Outpatient Medications:     cetirizine (ZyrTEC) 10 mg tablet, Take 10 mg by mouth daily, Disp: , Rfl:     cholecalciferol (VITAMIN D3) 400 units tablet, Take 400 Units by mouth daily, Disp: , Rfl:     lisdexamfetamine (VYVANSE) 60 MG capsule, Take 1 capsule (60 mg total) by mouth every morningMax Daily Amount: 60 mg, Disp: 30 capsule, Rfl: 0    loratadine (CLARITIN) 10 mg tablet, Take 10 mg by mouth daily, Disp: , Rfl:     Magnesium 250 MG TABS, Take 1 tablet by mouth daily, Disp: , Rfl:     melatonin 1 mg, Take 20 mg by mouth daily at bedtime , Disp: , Rfl:     metFORMIN (GLUCOPHAGE) 500 mg tablet, Take 500 mg by mouth 3 (three) times a day before meals, Disp: , Rfl:     Prenatal MV & Min w/FA-DHA (PRENATAL ADULT GUMMY/DHA/FA PO), Take by mouth, Disp: , Rfl:     sertraline (ZOLOFT) 25 mg tablet, TAKE 1 TABLET BY MOUTH EVERY DAY, Disp: 90 tablet, Rfl: 0    cephalexin (KEFLEX) 500 mg capsule, Take 1 capsule (500 mg total) by mouth every 12 (twelve) hours for 5 days, Disp: 10 capsule, Rfl: 0    Current Allergies     Allergies as of 2021    (No Known Allergies)            The following portions of the patient's history were reviewed and updated as appropriate: allergies, current medications, past family history, past medical history, past social history, past surgical history and problem list      Past Medical History:   Diagnosis Date    Alcohol abuse     in past    Anxiety     Arthritis     bilateral hips    Cyst of breast, left     Depression     Lump of left breast     LAST ASSESSED: 17    Varicella        Past Surgical History:   Procedure Laterality Date    SC INDUCED ABORTN BY DIL/EVAC N/A 2019    Procedure: DILATATION AND EVACUATION (D&E) 15 Weeks;  Surgeon: Ronda Alfaro MD;  Location:  MAIN OR;  Service: Gynecology    TONSILLECTOMY         Family History   Problem Relation Age of Onset    No Known Problems Mother     Substance Abuse Father         father  in  [place])    Alcohol abuse Father     Coronary artery disease Paternal Grandfather     Arthritis Paternal Grandfather     Alcohol abuse Other     Depression Other     Substance Abuse Other     Arthritis Family     No Known Problems Sister     No Known Problems Brother     No Known Problems Maternal Grandmother          Medications have been verified  Objective   /78   Pulse 98   Temp 98 5 °F (36 9 °C) (Tympanic)   Resp 16   Ht 5' 11" (1 803 m)   Wt 116 kg (254 lb 12 8 oz)   SpO2 100%   BMI 35 54 kg/m²   No LMP recorded  Physical Exam     Physical Exam  Skin:     Comments: Very small puncture wound left great toe, minimal erythema        note, she is up-to-date on tetanus injections

## 2021-03-17 ENCOUNTER — OFFICE VISIT (OUTPATIENT)
Dept: FAMILY MEDICINE CLINIC | Facility: HOSPITAL | Age: 31
End: 2021-03-17
Payer: COMMERCIAL

## 2021-03-17 VITALS
WEIGHT: 255.6 LBS | BODY MASS INDEX: 35.78 KG/M2 | HEART RATE: 94 BPM | HEIGHT: 71 IN | OXYGEN SATURATION: 98 % | SYSTOLIC BLOOD PRESSURE: 126 MMHG | DIASTOLIC BLOOD PRESSURE: 76 MMHG | TEMPERATURE: 98.8 F

## 2021-03-17 DIAGNOSIS — F41.1 GENERALIZED ANXIETY DISORDER: Primary | ICD-10-CM

## 2021-03-17 DIAGNOSIS — F33.0 MILD EPISODE OF RECURRENT MAJOR DEPRESSIVE DISORDER (HCC): ICD-10-CM

## 2021-03-17 PROCEDURE — 99214 OFFICE O/P EST MOD 30 MIN: CPT | Performed by: NURSE PRACTITIONER

## 2021-03-17 PROCEDURE — 3725F SCREEN DEPRESSION PERFORMED: CPT | Performed by: NURSE PRACTITIONER

## 2021-03-17 PROCEDURE — 1036F TOBACCO NON-USER: CPT | Performed by: NURSE PRACTITIONER

## 2021-03-17 PROCEDURE — 3008F BODY MASS INDEX DOCD: CPT | Performed by: NURSE PRACTITIONER

## 2021-03-17 NOTE — PATIENT INSTRUCTIONS
Obesity   AMBULATORY CARE:   Obesity  is when your body mass index (BMI) is greater than 30  Your healthcare provider will use your height and weight to measure your BMI  The risks of obesity include  many health problems, such as injuries or physical disability  You may need tests to check for the following:  · Diabetes    · High blood pressure or high cholesterol    · Heart disease    · Gallbladder or liver disease    · Cancer of the colon, breast, prostate, liver, or kidney    · Sleep apnea    · Arthritis or gout    Seek care immediately if:   · You have a severe headache, confusion, or difficulty speaking  · You have weakness on one side of your body  · You have chest pain, sweating, or shortness of breath  Contact your healthcare provider if:   · You have symptoms of gallbladder or liver disease, such as pain in your upper abdomen  · You have knee or hip pain and discomfort while walking  · You have symptoms of diabetes, such as intense hunger and thirst, and frequent urination  · You have symptoms of sleep apnea, such as snoring or daytime sleepiness  · You have questions or concerns about your condition or care  Treatment for obesity  focuses on helping you lose weight to improve your health  Even a small decrease in BMI can reduce the risk for many health problems  Your healthcare provider will help you set a weight-loss goal   · Lifestyle changes  are the first step in treating obesity  These include making healthy food choices and getting regular physical activity  Your healthcare provider may suggest a weight-loss program that involves coaching, education, and therapy  · Medicine  may help you lose weight when it is used with a healthy diet and physical activity  · Surgery  can help you lose weight if you are very obese and have other health problems  There are several types of weight-loss surgery  Ask your healthcare provider for more information      Be successful losing weight:   · Set small, realistic goals  An example of a small goal is to walk for 20 minutes 5 days a week  Anther goal is to lose 5% of your body weight  · Tell friends, family members, and coworkers about your goals  and ask for their support  Ask a friend to lose weight with you, or join a weight-loss support group  · Identify foods or triggers that may cause you to overeat , and find ways to avoid them  Remove tempting high-calorie foods from your home and workplace  Place a bowl of fresh fruit on your kitchen counter  If stress causes you to eat, then find other ways to cope with stress  · Keep a diary to track what you eat and drink  Also write down how many minutes of physical activity you do each day  Weigh yourself once a week and record it in your diary  Eating changes: You will need to eat 500 to 1,000 fewer calories each day than you currently eat to lose 1 to 2 pounds a week  The following changes will help you cut calories:  · Eat smaller portions  Use small plates, no larger than 9 inches in diameter  Fill your plate half full of fruits and vegetables  Measure your food using measuring cups until you know what a serving size looks like  · Eat 3 meals and 1 or 2 snacks each day  Plan your meals in advance  Deniz Mcnair and eat at home most of the time  Eat slowly  Do not skip meals  Skipping meals can lead to overeating later in the day  This can make it harder for you to lose weight  Talk with a dietitian to help you make a meal plan and schedule that is right for you  · Eat fruits and vegetables at every meal   They are low in calories and high in fiber, which makes you feel full  Do not add butter, margarine, or cream sauce to vegetables  Use herbs to season steamed vegetables  · Eat less fat and fewer fried foods  Eat more baked or grilled chicken and fish  These protein sources are lower in calories and fat than red meat  Limit fast food   Dress your salads with olive oil and vinegar instead of bottled dressing  · Limit the amount of sugar you eat  Do not drink sugary beverages  Limit alcohol  Activity changes:  Physical activity is good for your body in many ways  It helps you burn calories and build strong muscles  It decreases stress and depression, and improves your mood  It can also help you sleep better  Talk to your healthcare provider before you begin an exercise program   · Exercise for at least 30 minutes 5 days a week  Start slowly  Set aside time each day for physical activity that you enjoy and that is convenient for you  It is best to do both weight training and an activity that increases your heart rate, such as walking, bicycling, or swimming  · Find ways to be more active  Do yard work and housecleaning  Walk up the stairs instead of using elevators  Spend your leisure time going to events that require walking, such as outdoor festivals or fairs  This extra physical activity can help you lose weight and keep it off  Follow up with your healthcare provider as directed: You may need to meet with a dietitian  Write down your questions so you remember to ask them during your visits  © Copyright [facility] [address] Information is for End User's use only and may not be sold, redistributed or otherwise used for commercial purposes  All illustrations and images included in CareNotes® are the copyrighted property of [employer]  or [facility] Russell Castelan   The above information is an  only  It is not intended as medical advice for individual conditions or treatments  Talk to your doctor, nurse or pharmacist before following any medical regimen to see if it is safe and effective for you

## 2021-03-17 NOTE — PROGRESS NOTES
Assessment/Plan:    Mild episode of recurrent major depressive disorder (HCC)  Controlled on current sertraline dose  Continue on this dose  F/U in 6 months  Call with worsening mood  Generalized anxiety disorder  Controlled on current sertraline dose  Continue on this dose  F/U in 6 months  Call with worsening mood  ADD (attention deficit disorder) without hyperactivity  Controlled  Discussed executive function training and ideas  Continue on current Vyvanse dose  F/U in 6 months  Diagnoses and all orders for this visit:    Generalized anxiety disorder    Mild episode of recurrent major depressive disorder (HCC)          Subjective:      Patient ID: Cecilia Hernandez is a 35 y o  female  Doing well  Has taken a break from fertility treatments  Reports it was just too much physically and mentally  Has a lot of good things going on  Mood is up and down but states no real lows  Felt 50 mg sertraline made her feel blunted  Likes that 25 mg allows her to be sad when she should be sad  Reports that she is able to get herself out of any lows she is having  Anxiety is controlled  Feels Vyvanse is effective but still struggles at times with getting tasks done and staying on task  The following portions of the patient's history were reviewed and updated as appropriate: allergies, current medications, past family history, past medical history, past social history, past surgical history and problem list     Review of Systems   Constitutional: Negative for activity change, appetite change and unexpected weight change  Psychiatric/Behavioral: Negative for dysphoric mood, sleep disturbance and suicidal ideas  The patient is not nervous/anxious  Objective:  Vitals:    03/17/21 1453   BP: 126/76   Pulse: 94   Temp: 98 8 °F (37 1 °C)   SpO2: 98%      Physical Exam  Constitutional:       Appearance: Normal appearance     Cardiovascular:      Rate and Rhythm: Normal rate and regular rhythm  Heart sounds: Normal heart sounds  Pulmonary:      Effort: Pulmonary effort is normal       Breath sounds: Normal breath sounds  Skin:     General: Skin is warm and dry  Neurological:      Mental Status: She is alert and oriented to person, place, and time  Psychiatric:         Mood and Affect: Mood normal          Behavior: Behavior normal          Thought Content: Thought content normal          Judgment: Judgment normal          BMI Counseling: Body mass index is 35 65 kg/m²  The BMI is above normal  Nutrition recommendations include reducing portion sizes, decreasing overall calorie intake, 3-5 servings of fruits/vegetables daily, reducing fast food intake, consuming healthier snacks, decreasing soda and/or juice intake, moderation in carbohydrate intake and increasing intake of lean protein  Exercise recommendations include moderate aerobic physical activity for 150 minutes/week

## 2021-03-17 NOTE — ASSESSMENT & PLAN NOTE
Controlled on current sertraline dose  Continue on this dose  F/U in 6 months  Call with worsening mood

## 2021-03-17 NOTE — ASSESSMENT & PLAN NOTE
Controlled  Discussed executive function training and ideas  Continue on current Vyvanse dose  F/U in 6 months

## 2021-03-28 DIAGNOSIS — F98.8 ADD (ATTENTION DEFICIT DISORDER) WITHOUT HYPERACTIVITY: ICD-10-CM

## 2021-03-28 NOTE — PRE-PROCEDURE INSTRUCTIONS
Pre-Surgery Instructions:   Medication Instructions    Magnesium 250 MG TABS Instructed patient per Anesthesia Guidelines   melatonin 1 mg Instructed patient per Anesthesia Guidelines   Prenatal MV & Min w/FA-DHA (PRENATAL ADULT GUMMY/DHA/FA PO) Instructed patient per Anesthesia Guidelines   sertraline (ZOLOFT) 50 mg tablet Instructed patient per Anesthesia Guidelines  Pre op instructions reviewed; verbalized understanding   Antidepressant Med Class     Continue to take this medication on your normal schedule  If this is an oral medication and you take it in the morning, then you may take this medicine with a sip of water  Herbal Med Class     Stop taking this herbal medications at least one week prior to surgery/procedure  Orientation to room/Bed in low position, brakes on/Side rails x 2 or 4 up, assess large gaps, such that a patient could get extremity or other body part entrapped, use additional safety procedures

## 2021-05-06 DIAGNOSIS — F41.1 GENERALIZED ANXIETY DISORDER: ICD-10-CM

## 2021-05-06 RX ORDER — SERTRALINE HYDROCHLORIDE 25 MG/1
TABLET, FILM COATED ORAL
Qty: 90 TABLET | Refills: 1 | Status: SHIPPED | OUTPATIENT
Start: 2021-05-06 | End: 2021-10-10

## 2021-05-26 ENCOUNTER — TELEPHONE (OUTPATIENT)
Dept: OBGYN CLINIC | Facility: MEDICAL CENTER | Age: 31
End: 2021-05-26

## 2021-06-15 ENCOUNTER — INITIAL PRENATAL (OUTPATIENT)
Dept: OBGYN CLINIC | Facility: MEDICAL CENTER | Age: 31
End: 2021-06-15

## 2021-06-15 DIAGNOSIS — Z34.91 ENCOUNTER FOR PREGNANCY RELATED EXAMINATION IN FIRST TRIMESTER: Primary | ICD-10-CM

## 2021-06-15 PROCEDURE — OBC: Performed by: OBSTETRICS & GYNECOLOGY

## 2021-06-15 NOTE — PROGRESS NOTES
OB INTAKE INTERVIEW      Pt presents for OB intake  Pre pregnancy weight= 255 pounds      OB History    Para Term  AB Living   3 0 0 0 2 0   SAB TAB Ectopic Multiple Live Births   1 0 0 0 0      # Outcome Date GA Lbr Geovany/2nd Weight Sex Delivery Anes PTL Lv   3 Current            2 SAB      SAB         Birth Comments: early first trimester SAB   1 AB 19 15w3d       FD      Birth Comments: D&E      Complications: Abruptio Placenta,  premature rupture of membranes (PPROM) with unknown onset of labor     H/o infertility in past but conceived naturally    Hx of  delivery prior to 36 weeks 6 days: yes  Prior PPROM and placental abruption at 15 weeks 3 days     Last Menstrual Period:   Patient's last menstrual period was 2021 (exact date)  Ultrasound date:   2021 6 weeks 6 days (US completed at infertility office)  Estimated date of delivery:   Estimated Date of Delivery: 2021  confirmed by [address]  ? History of Diabetes: denies  History of Hypertension: denies      Infection Screening: Does the pt have a hx of MRSA? denies    H&P visit scheduled  ?  Interview education  Information on [facility] Pregnancy Essentials reviewed  Handouts given: How to Access Pregnancy Essentials Guide  [facility] Me support center  [facility] information sheet  COVID-19: Information on COVID-19 mRNA vaccine given    Interview education    [facility]  Discussed genetic testing-    - referral to MFM given  Appointment scheduled        - Completed expanded carrier screening with infertility specialist   All results negative     Discussed Tdap and Influenza vaccines         Depression Screening Follow-up Plan: Patient's depression screening was negative  with an [place] score of  2                  The patient was oriented to our practice and all questions were answered    Interviewed by: Wendy Hendrix, RN 06/15/21

## 2021-06-23 ENCOUNTER — TELEPHONE (OUTPATIENT)
Dept: PERINATAL CARE | Facility: CLINIC | Age: 31
End: 2021-06-23

## 2021-06-23 NOTE — TELEPHONE ENCOUNTER
Called patient to confirm Maternal Fetal Medicine virtual appt scheduled for 6/24/21  1:00  LETHA HAYES  Confirmed with patient that she will receive a prompt, by her preference of text  Explained procedure for virtual visit and requested she be ready to log into appointment approximately 10 minutes prior to scheduled start time  Reminder the [facility] Provider will send her the link to join virtual appt near the scheduled appointment time  Also confirmed with pt current text info and current insurance information  Patient instructed to call [facility] office @ #[phone] for technical support issues or any questions regarding the procedure for virtual appt       LEFT VOICEMAIL FOR PT WITH INFORMATION ABOVE

## 2021-06-24 ENCOUNTER — TELEMEDICINE (OUTPATIENT)
Dept: PERINATAL CARE | Facility: CLINIC | Age: 31
End: 2021-06-24

## 2021-06-24 DIAGNOSIS — Z36.9 UNSPECIFIED ANTENATAL SCREENING: Primary | ICD-10-CM

## 2021-06-24 DIAGNOSIS — Z31.5 ENCOUNTER FOR PROCREATIVE GENETIC COUNSELING: ICD-10-CM

## 2021-06-24 DIAGNOSIS — Z84.89 FAMILY HISTORY OF GENETIC DISORDER: ICD-10-CM

## 2021-06-24 PROCEDURE — NC001 PR NO CHARGE

## 2021-06-25 LAB
ABO GROUP BLD: NORMAL
APPEARANCE UR: CLEAR
BACTERIA UR CULT: NORMAL
BACTERIA URNS QL MICRO: NORMAL
BASOPHILS # BLD AUTO: 0 X10E3/UL (ref 0–0.2)
BASOPHILS NFR BLD AUTO: 0 %
BILIRUB UR QL STRIP: NEGATIVE
BLD GP AB SCN SERPL QL: NEGATIVE
CASTS URNS QL MICRO: NORMAL /LPF
COLOR UR: YELLOW
EOSINOPHIL # BLD AUTO: 0.1 X10E3/UL (ref 0–0.4)
EOSINOPHIL NFR BLD AUTO: 1 %
EPI CELLS #/AREA URNS HPF: NORMAL /HPF (ref 0–10)
ERYTHROCYTE [DISTWIDTH] IN BLOOD BY AUTOMATED COUNT: 12.7 % (ref 11.7–15.4)
GLUCOSE UR QL: NEGATIVE
HBV SURFACE AG SERPL QL IA: NEGATIVE
HCT VFR BLD AUTO: 40.1 % (ref 34–46.6)
HGB BLD-MCNC: 13.2 G/DL (ref 11.1–15.9)
HGB UR QL STRIP: NEGATIVE
HIV 1+2 AB+HIV1 P24 AG SERPL QL IA: NON REACTIVE
IMM GRANULOCYTES # BLD: 0.1 X10E3/UL (ref 0–0.1)
IMM GRANULOCYTES NFR BLD: 1 %
KETONES UR QL STRIP: NEGATIVE
LEUKOCYTE ESTERASE UR QL STRIP: ABNORMAL
LYMPHOCYTES # BLD AUTO: 2.1 X10E3/UL (ref 0.7–3.1)
LYMPHOCYTES NFR BLD AUTO: 23 %
Lab: NORMAL
MCH RBC QN AUTO: 29.5 PG (ref 26.6–33)
MCHC RBC AUTO-ENTMCNC: 32.9 G/DL (ref 31.5–35.7)
MCV RBC AUTO: 90 FL (ref 79–97)
MICRO URNS: ABNORMAL
MONOCYTES # BLD AUTO: 0.7 X10E3/UL (ref 0.1–0.9)
MONOCYTES NFR BLD AUTO: 7 %
NEUTROPHILS # BLD AUTO: 6.3 X10E3/UL (ref 1.4–7)
NEUTROPHILS NFR BLD AUTO: 68 %
NITRITE UR QL STRIP: NEGATIVE
PH UR STRIP: 7.5 [PH] (ref 5–7.5)
PLATELET # BLD AUTO: 299 X10E3/UL (ref 150–450)
PROT UR QL STRIP: NEGATIVE
RBC # BLD AUTO: 4.48 X10E6/UL (ref 3.77–5.28)
RBC #/AREA URNS HPF: NORMAL /HPF (ref 0–2)
RH BLD: POSITIVE
RPR SER QL: NON REACTIVE
RUBV IGG SERPL IA-ACNC: 1.33 INDEX
SP GR UR: 1.01 (ref 1–1.03)
UROBILINOGEN UR STRIP-ACNC: 0.2 MG/DL (ref 0.2–1)
WBC # BLD AUTO: 9.2 X10E3/UL (ref 3.4–10.8)
WBC #/AREA URNS HPF: NORMAL /HPF (ref 0–5)

## 2021-06-27 NOTE — PROGRESS NOTES
Genetic Counseling   High-Risk Gestation Note    Appointment Date:  2021  Referred By: Natalie Nuno MD  YOB: 1990  Partner:  Lokesh Singerchris  Indication for Visit:  Prenatal testing and screening options  Pregnancy History:   Estimated Date of Delivery: 22  Estimated Gestational Age: 12w2d    Virtual Regular Visit      Assessment/Plan:    Problem List Items Addressed This Visit     None      Visit Diagnoses     Unspecified  screening    -  Primary    Family history of genetic disorder        Encounter for procreative genetic counseling                   Reason for visit is   Chief Complaint   Patient presents with    Virtual Regular Visit        Encounter provider Talib Gore    Provider located at [address] [place] [id number]  [phone]      Recent Visits  Date Type Provider Dept   21 Telephone Stacy Suman, [address]   Showing recent visits within past 7 days and meeting all other requirements  Future Appointments  No visits were found meeting these conditions  Showing future appointments within next 150 days and meeting all other requirements       The patient was identified by name and date of birth  Lotus Comfort was informed that this is a telemedicine visit and that the visit is being conducted through [address] [place] [address] Road [facility] and patient was informed that this is a secure, HIPAA-compliant platform  She agrees to proceed     My office door was closed  No one else was in the room  She acknowledged consent and understanding of privacy and security of the video platform  The patient has agreed to participate and understands they can discontinue the visit at any time  Patient is aware this is a billable service  Letty Campo is a 28 y o  female who presented for genetic counseling to discuss the available prenatal testing and screening options    We reviewed that The risk of Down syndrome at age 28 at delivery is 1/840, and the risk for any chromosomal abnormality at this age is 1/385  The differences between full chromosome aneuploidies and copy number variants (microdeletions and microduplications) was also discussed  We reviewed that copy number variants occur in about 0 4% of all pregnancies  Therefore, for patients under age 39 the risk for copy number variants is higher than the risk for Down syndrome  The risks, benefits, and limitations of amniocentesis were discussed with the patient  Amniocentesis is performed under direct real time ultrasound visualization to avoid both the fetus and the placenta  Once amniotic fluid is withdrawn, laboratory analysis is performed and amniotic fluid alpha-fetoprotein, as well as chromosome and/or microarray analysis is undertaken  The risk of genetic amniocentesis includes, but is not limited to less than 1 in 300 pregnancy loss rate or  delivery rate if 23 weeks or greater, infection, bleeding, rupture of membranes, failure of cells to grow, karyotype error, laboratory error, etc   Occasionally a repeat amniocentesis is necessary due to cell culture failure  Chromosome/microarray analysis from amniocentesis is 99 9% accurate and alpha-fetoprotein analysis can detect approximately 95% of open neural tube defects  Chorionic villus sampling (CVS) is another diagnostic testing option that is available earlier than amniocentesis, between 10-14 weeks gestation  Like amniocentesis, CVS is 99% accurate for detecting chromosomal problems  Unlike amniocentesis, CVS cannot detect alpha-fetoprotein levels in order to determine the risk for open neural tube defects  MSAFP testing would need to be performed at 15-20 weeks gestation for this purpose  The risk of CVS includes, but is not limited to, less than a 1 in 300 risk for pregnancy loss    There is also a 1% risk for maternal cell contamination and cell culture failure, in which case the CVS would need to be followed-up with amniocentesis  We reviewed the testing option of cell free fetal DNA screening (also known as noninvasive prenatal testing or NIPT)  We discussed that it is a serum test to identify fragments of fetal DNA in maternal blood  We reviewed the benefits and limitations of cell free fetal DNA screening in detecting Down syndrome, Trisomy 13, Trisomy 25 and sex chromosome aneuploidies  We also discussed that cell free fetal DNA screening does not detect additional chromosomal abnormalities and the possibility of a failed test result  As cell free fetal DNA screening does not detect open neural tube defects, MSAFP screening is available at 15-20 weeks gestation  Sequential screening consists of first trimester measurement of nuchal translucency combined with first trimester biochemical analysis, as well as second trimester biochemical analysis  It is able to detect approximately 95% of pregnancies in which the fetus has Down syndrome, 90% of pregnancies in which the fetus has trisomy 25 and 80% of pregnancies which the fetus has an open neural tube defect  It can also indirectly identify other chromosomal abnormalities, copy number variants, genetic syndromes, or adverse pregnancy outcomes if serum analyte levels are abnormal     We discussed the availability of an ultrasound between 11-14 weeks gestation to measure the nuchal translucency (NT), which can assess for chromosome abnormalities, cardiac defects, and other adverse pregnancy outcomes  We reviewed that level II anatomy ultrasound is typically performed at approximately 20 weeks gestation  Level II ultrasound evaluation is between 60-80% accurate in detecting major physical birth defects and variations in fetal development that may be associated with chromosome abnormalities  Level II ultrasound evaluation is not able to detect all birth defects or health problems      After discussing the available prenatal screening and testing options Kali Novoa elected to pursue cell free fetal DNA screening  She was informed that the results will disclose the fetal sex and will be available in her MyChart to review  The patient opted to  a [facility] [id number] lab slip, to be drawn at a [facility] lab  Results take approximately 7-10 days  The [id number] cost estimate information was also provided to the patient and she was encouraged to find out how much it would be prior to getting her blood drawn  The maximum out of pocket cost of $299 through the Every Mom Pledge program was also discussed with the patient  The patient declined CVS and amniocentesis secondary to procedural related complications  She may reconsider diagnostic testing should the cell free fetal DNA screening come back abnormal   Kali Novoa is also planning on pursuing NT ultrasound, MSAFP screening and Level II ultrasound at the appropriate times  Histories for the patient and her partner's family were taken during our session  Kali Novoa states that her brother had a pregnancy with Trisomy 15  We reviewed that this history does not increase the risk to the pregnancy over her age related risk  Kali Novoa also reports a male cousin (from a maternal uncle) with autism, etiology unknown  We reviewed the general population risk for a diagnosis of autism, which is estimated at approximately 1/  We also reviewed the possible etiologies of autism, including multifactorial and genetic  Autism may be secondary to a chromosomal abnormality or single gene disorder; a genetic cause can now be identified in up to approximately 35-40% of cases  However there are over 800 genes associated with autism that have been identified to date, making a prenatal diagnosis and risk assessment difficult without records on the affected individual   Further review of family history for the patient and her partner was noncontributory    The family history was not significant for other genetic diseases or disorders, intellectual disability, birth defects, fetal loss, or consanguinity  Patient reports being of Pashto/Albanian/Polish descent and that her  is of English/Malagasy/Albanian descent  She denies either of them having known Ashkenazi Alevism ancestry  The patient had expanded carrier screening through [facility] and was negative for 283 conditions  Lastly, we discussed the fact that everyone in the general population regardless of age, family history, or medical background has approximately a 3-5% risk of having a child with some type of congenital anomaly, genetic disease or intellectual disability  Currently there are no tests available to rule out all birth defects or health problems  Galo was provided with our contact information  I encouraged her to call with any questions or concerns  Plan/Tests Ordered:  1) Patient declined CVS, amniocentesis, Sequential screen  2) Patient elected NIPT - patient picked up Elsie Sanches today at [facility] location  3) NT ultrasound scheduled for 6/30/21  4) MSAFP screening at 15-20 weeks gestation - to be ordered by [facility] office  5) Level II anatomy ultrasound at approximately 20 weeks gestation        HPI     Past Medical History:   Diagnosis Date    Alcohol abuse     in past    Anxiety     Arthritis     bilateral hips    Cyst of breast, left     Depression     Female infertility     Lump of left breast     LAST ASSESSED: 4/13/17    PCOS (polycystic ovarian syndrome)     Polycystic ovary syndrome     Substance abuse ([facility] [address] )     Marijuana use in past    Varicella        Past Surgical History:   Procedure Laterality Date    NE INDUCED ABORTN BY DIL/EVAC N/A 9/18/2019    Procedure: DILATATION AND EVACUATION (D&E) 15 Weeks;  Surgeon: Sherwin Griffin MD;  [facility]: [facility];  Service: Gynecology    TONSILLECTOMY         Current Outpatient Medications   Medication Sig Dispense Refill    aspirin (ECOTRIN LOW STRENGTH) 81 mg EC tablet Take 162 mg by mouth daily      cetirizine (ZyrTEC) 10 mg tablet Take 10 mg by mouth daily (Patient not taking: Reported on 6/30/2021)      cholecalciferol (VITAMIN D3) 400 units tablet Take 400 Units by mouth daily      lisdexamfetamine (VYVANSE) 60 MG capsule Take 1 capsule (60 mg total) by mouth every morningMax Daily Amount: 60 mg (Patient not taking: Reported on 6/30/2021) 30 capsule 0    loratadine (CLARITIN) 10 mg tablet Take 10 mg by mouth daily      Magnesium 250 MG TABS Take 1 tablet by mouth daily      melatonin 1 mg Take 20 mg by mouth daily at bedtime  (Patient not taking: Reported on 6/28/2021)      metFORMIN (GLUCOPHAGE) 500 mg tablet Take 500 mg by mouth 3 (three) times a day before meals (Patient not taking: Reported on 6/28/2021)      Prenatal MV & Min w/FA-DHA (PRENATAL ADULT GUMMY/DHA/FA PO) Take by mouth      sertraline (ZOLOFT) 25 mg tablet TAKE 1 TABLET BY MOUTH EVERY DAY 90 tablet 1     No current facility-administered medications for this visit  No Known Allergies    Review of Systems    Video Exam    There were no vitals filed for this visit  Physical Exam     I spent 60 minutes directly with the patient during this visit      VIRTUAL VISIT DISCLAIMER    Danette Pickard acknowledges that she has consented to an online visit or consultation  She understands that the online visit is based solely on information provided by her, and that, in the absence of a face-to-face physical evaluation by the physician, the diagnosis she receives is both limited and provisional in terms of accuracy and completeness  This is not intended to replace a full medical face-to-face evaluation by the physician  Danette Pickard understands and accepts these terms

## 2021-06-28 ENCOUNTER — INITIAL PRENATAL (OUTPATIENT)
Dept: OBGYN CLINIC | Facility: MEDICAL CENTER | Age: 31
End: 2021-06-28
Payer: COMMERCIAL

## 2021-06-28 DIAGNOSIS — Z87.51 HISTORY OF PRETERM DELIVERY: ICD-10-CM

## 2021-06-28 DIAGNOSIS — Z3A.13 13 WEEKS GESTATION OF PREGNANCY: Primary | ICD-10-CM

## 2021-06-28 DIAGNOSIS — E28.2 PCOS (POLYCYSTIC OVARIAN SYNDROME): ICD-10-CM

## 2021-06-28 PROCEDURE — 99214 OFFICE O/P EST MOD 30 MIN: CPT | Performed by: OBSTETRICS & GYNECOLOGY

## 2021-06-28 PROCEDURE — 87491 CHLMYD TRACH DNA AMP PROBE: CPT | Performed by: OBSTETRICS & GYNECOLOGY

## 2021-06-28 PROCEDURE — 87591 N.GONORRHOEAE DNA AMP PROB: CPT | Performed by: OBSTETRICS & GYNECOLOGY

## 2021-06-28 RX ORDER — ASPIRIN 81 MG/1
162 TABLET ORAL DAILY
COMMUNITY
End: 2021-12-13

## 2021-06-30 ENCOUNTER — ROUTINE PRENATAL (OUTPATIENT)
Dept: PERINATAL CARE | Facility: OTHER | Age: 31
End: 2021-06-30
Payer: COMMERCIAL

## 2021-06-30 VITALS
BODY MASS INDEX: 37.57 KG/M2 | HEART RATE: 103 BPM | DIASTOLIC BLOOD PRESSURE: 88 MMHG | SYSTOLIC BLOOD PRESSURE: 120 MMHG | WEIGHT: 268.4 LBS | HEIGHT: 71 IN

## 2021-06-30 DIAGNOSIS — Z3A.13 13 WEEKS GESTATION OF PREGNANCY: ICD-10-CM

## 2021-06-30 DIAGNOSIS — Z36.82 ENCOUNTER FOR ANTENATAL SCREENING FOR NUCHAL TRANSLUCENCY: ICD-10-CM

## 2021-06-30 DIAGNOSIS — O99.211 MATERNAL OBESITY, ANTEPARTUM, FIRST TRIMESTER: Primary | ICD-10-CM

## 2021-06-30 PROCEDURE — 76813 OB US NUCHAL MEAS 1 GEST: CPT | Performed by: OBSTETRICS & GYNECOLOGY

## 2021-06-30 PROCEDURE — 99203 OFFICE O/P NEW LOW 30 MIN: CPT | Performed by: OBSTETRICS & GYNECOLOGY

## 2021-06-30 PROCEDURE — 1036F TOBACCO NON-USER: CPT | Performed by: OBSTETRICS & GYNECOLOGY

## 2021-06-30 PROCEDURE — 3008F BODY MASS INDEX DOCD: CPT | Performed by: OBSTETRICS & GYNECOLOGY

## 2021-06-30 NOTE — LETTER
June 30, 2021     Vaishali Ferrari MD  [address]  [place] [address]    Patient: Robert Quiñones   YOB: 1990   Date of Visit: 6/30/2021       Dear Dr Luke Beltran: Thank you for referring Robert Quiñones to me for evaluation  Below are my notes for this consultation  If you have questions, please do not hesitate to call me  I look forward to following your patient along with you  Sincerely,        Dianna Patel MD        CC: No Recipients  Dianna Patel MD  6/30/2021  1:05 PM  Sign when Signing Visit   Please refer to the [facility] ultrasound report in Ob Procedures for additional information regarding today's visit

## 2021-06-30 NOTE — PROGRESS NOTES
Please refer to the [facility] ultrasound report in Ob Procedures for additional information regarding today's visit

## 2021-07-01 LAB
C TRACH DNA SPEC QL NAA+PROBE: NEGATIVE
N GONORRHOEA DNA SPEC QL NAA+PROBE: NEGATIVE

## 2021-07-06 ENCOUNTER — TELEPHONE (OUTPATIENT)
Dept: PERINATAL CARE | Facility: CLINIC | Age: 31
End: 2021-07-06

## 2021-07-06 NOTE — TELEPHONE ENCOUNTER
----- Message from Davey Moralez MD sent at 7/6/2021  2:43 PM EDT -----   I reviewed the lab study today and the results revealed low risks for trisomy 21, 25, 15, and sex chromosome aneuploidies

## 2021-07-06 NOTE — TELEPHONE ENCOUNTER
Spoke with pt and provided her with the results of the [address]  Pt provided with gender after name and  confirmation  Pt instructed on MSAFP being ordered by OB and timing of the test   PT receptive to information and declines questions at this time

## 2021-07-08 NOTE — ASSESSMENT & PLAN NOTE
Review of the the M recommendations and the plan of care with Jennifer Wang  We will check the cervical length often and plan on surveillance  She is not that strongly feeling a cerclage

## 2021-07-08 NOTE — PROGRESS NOTES
Initial Prenatal Visit  [facility] [address] [place] [address]    Assessment/Plan:  Sandy Dennis is a 28y o  year old  at 14w2d who presents for initial prenatal visit  Supervision of normal pregnancy  - Prenatal labs reviewed and normal   Blood type: o postive   - Aneuploidy screening discussed  Patient opts for cell free DNA testing   - Routine cervical cancer screening: Pap Up to date  - Routine STI Screening: GC/Chlamydia sent today  HIV/Hep B/Syphilis ordered in prenatal panel   - Patient Education: Patient was counseled regarding diet, exercise, weight gain, foods to avoid, vaccines in pregnancy, aneuploidy screening, travel precautions to include seat belt use and VTE risk reduction  She has been provided our pregnancy packet which includes how and when to contact providers, medication recommendations, dietary suggestions, breastfeeding information as well as websites for additional information, hospital and delivery concerns  Additional Pregnancy Problems:   1  13 weeks gestation of pregnancy  -     Chlamydia/GC amplified DNA by PCR    2  History of  delivery  Assessment & Plan:  Review of the the [facility] recommendations and the plan of care with Sandy Dennis  We will check the cervical length often and plan on surveillance  She is not that strongly feeling a cerclage  3  PCOS (polycystic ovarian syndrome)  Assessment & Plan:  Early GTT to be done           Subjective:   CC:  Desires prenatal care  Harriet Oh is a 28 y o   female who presents for prenatal care  Pregnancy ROS: no leakage of fluid, pelvic pain, or vaginal bleeding  yes nausea/vomiting      The following portions of the patient's history were reviewed and updated as appropriate: allergies, current medications, past family history, past medical history, obstetric history, gynecologic history, past social history, past surgical history and problem list       Objective:  LMP 2021 (Exact Date)   Pregravid Weight/BMI: 116 kg (255 lb) (BMI 35 58)  Current Weight:     Total Weight Gain: Not found  Pre- Vitals      Most Recent Value   Prenatal Assessment   Fetal Heart Rate  160   Prenatal Vitals   Urine Albumin/Glucose   Dilation/Effacement/Station   Vaginal Drainage   Edema         General: Well appearing, no distress  Respiratory: Normal respiratory rate, lungs clear to auscultation, no wheezing or rales  Cardiovascular: Regular rate and rhythm, no murmurs, rubs, or gallops  Breasts: Normal bilaterally, nontender without masses, asymmetry, or nipple discharge  Abdomen: Soft, gravid, nontender  : Urethra normal  Normal labia majora and minora  Vagina normal   No vaginal bleeding  No vaginal discharge  Cervix visually closed  Extremities: Warm and well perfused  Non tender  No edema

## 2021-07-14 ENCOUNTER — TELEPHONE (OUTPATIENT)
Dept: PERINATAL CARE | Facility: CLINIC | Age: 31
End: 2021-07-14

## 2021-07-14 NOTE — TELEPHONE ENCOUNTER
Left patient a message that her MFM appointment had to be rescheduled  The new time, date and location were provided - 8/18/21  8:45  [place]  The patient has been instructed to please call us back at [phone] with any questions or concerns

## 2021-07-22 ENCOUNTER — ROUTINE PRENATAL (OUTPATIENT)
Dept: PERINATAL CARE | Facility: OTHER | Age: 31
End: 2021-07-22
Payer: COMMERCIAL

## 2021-07-22 VITALS
WEIGHT: 271 LBS | SYSTOLIC BLOOD PRESSURE: 125 MMHG | DIASTOLIC BLOOD PRESSURE: 85 MMHG | HEART RATE: 78 BPM | HEIGHT: 71 IN | BODY MASS INDEX: 37.94 KG/M2

## 2021-07-22 DIAGNOSIS — O99.212 OBESITY AFFECTING PREGNANCY IN SECOND TRIMESTER: ICD-10-CM

## 2021-07-22 DIAGNOSIS — Z3A.16 16 WEEKS GESTATION OF PREGNANCY: Primary | ICD-10-CM

## 2021-07-22 DIAGNOSIS — O09.899 HISTORY OF PRETERM DELIVERY, CURRENTLY PREGNANT: ICD-10-CM

## 2021-07-22 DIAGNOSIS — Z36.86 ENCOUNTER FOR ANTENATAL SCREENING FOR CERVICAL LENGTH: ICD-10-CM

## 2021-07-22 PROBLEM — N97.0 INFERTILITY ASSOCIATED WITH ANOVULATION: Status: RESOLVED | Noted: 2020-05-29 | Resolved: 2021-07-22

## 2021-07-22 PROBLEM — Z31.69 ENCOUNTER FOR PRECONCEPTION CONSULTATION: Status: RESOLVED | Noted: 2019-10-29 | Resolved: 2021-07-22

## 2021-07-22 PROBLEM — O99.210 OBESITY AFFECTING PREGNANCY: Status: ACTIVE | Noted: 2020-05-29

## 2021-07-22 PROCEDURE — 76815 OB US LIMITED FETUS(S): CPT | Performed by: OBSTETRICS & GYNECOLOGY

## 2021-07-22 PROCEDURE — 99213 OFFICE O/P EST LOW 20 MIN: CPT | Performed by: OBSTETRICS & GYNECOLOGY

## 2021-07-22 PROCEDURE — 76817 TRANSVAGINAL US OBSTETRIC: CPT | Performed by: OBSTETRICS & GYNECOLOGY

## 2021-07-22 NOTE — LETTER
July 22, 2021     Jose Cordon MD  [address]    Patient: Sophy Varma   YOB: 1990   Date of Visit: 7/22/2021       Dear Dr Tanisha Pink: Thank you for referring Sophy Varma to me for evaluation  Below are my notes for this consultation  If you have questions, please do not hesitate to call me  I look forward to following your patient along with you  Sincerely,        Usha Johnson MD        CC: No Recipients  Usha Johnson MD  7/22/2021  3:40 PM  Sign when Signing Visit  [address]: Ms Kelly Mccauley was seen today at 16w2d for serial cervical length screening ultrasound  See ultrasound report under "OB Procedures" tab    Please don't hesitate to contact our office with any concerns or questions   -Usha Johnson MD

## 2021-07-22 NOTE — PROGRESS NOTES
[address]: Ms Smita Pruitt was seen today at 16w2d for serial cervical length screening ultrasound  See ultrasound report under "OB Procedures" tab    Please don't hesitate to contact our office with any concerns or questions   -Alanis Seo MD

## 2021-07-22 NOTE — PROGRESS NOTES
Ultrasound Probe Disinfection    A transvaginal ultrasound was performed  Prior to use, disinfection was performed with High Level Disinfection Process ([employer])  Probe serial number B3: [id number] was used        Hershel Fall 07/22/21  2:55 PM

## 2021-07-27 ENCOUNTER — ROUTINE PRENATAL (OUTPATIENT)
Dept: OBGYN CLINIC | Facility: MEDICAL CENTER | Age: 31
End: 2021-07-27
Payer: COMMERCIAL

## 2021-07-27 VITALS — WEIGHT: 213 LBS | DIASTOLIC BLOOD PRESSURE: 70 MMHG | SYSTOLIC BLOOD PRESSURE: 122 MMHG | BODY MASS INDEX: 29.71 KG/M2

## 2021-07-27 DIAGNOSIS — Z34.92 SECOND TRIMESTER PREGNANCY: ICD-10-CM

## 2021-07-27 DIAGNOSIS — O99.212 OBESITY AFFECTING PREGNANCY IN SECOND TRIMESTER: ICD-10-CM

## 2021-07-27 DIAGNOSIS — Z3A.17 17 WEEKS GESTATION OF PREGNANCY: Primary | ICD-10-CM

## 2021-07-27 LAB
BASOPHILS # BLD AUTO: 0.03 THOUSANDS/ΜL (ref 0–0.1)
BASOPHILS NFR BLD AUTO: 0 % (ref 0–1)
EOSINOPHIL # BLD AUTO: 0.06 THOUSAND/ΜL (ref 0–0.61)
EOSINOPHIL NFR BLD AUTO: 1 % (ref 0–6)
ERYTHROCYTE [DISTWIDTH] IN BLOOD BY AUTOMATED COUNT: 13.5 % (ref 11.6–15.1)
GLUCOSE 1H P 50 G GLC PO SERPL-MCNC: 87 MG/DL (ref 40–134)
HCT VFR BLD AUTO: 38.2 % (ref 34.8–46.1)
HGB BLD-MCNC: 12.7 G/DL (ref 11.5–15.4)
IMM GRANULOCYTES # BLD AUTO: 0.07 THOUSAND/UL (ref 0–0.2)
IMM GRANULOCYTES NFR BLD AUTO: 1 % (ref 0–2)
LYMPHOCYTES # BLD AUTO: 2.63 THOUSANDS/ΜL (ref 0.6–4.47)
LYMPHOCYTES NFR BLD AUTO: 25 % (ref 14–44)
MCH RBC QN AUTO: 29.9 PG (ref 26.8–34.3)
MCHC RBC AUTO-ENTMCNC: 33.2 G/DL (ref 31.4–37.4)
MCV RBC AUTO: 90 FL (ref 82–98)
MONOCYTES # BLD AUTO: 0.81 THOUSAND/ΜL (ref 0.17–1.22)
MONOCYTES NFR BLD AUTO: 8 % (ref 4–12)
NEUTROPHILS # BLD AUTO: 7.09 THOUSANDS/ΜL (ref 1.85–7.62)
NEUTS SEG NFR BLD AUTO: 65 % (ref 43–75)
NRBC BLD AUTO-RTO: 0 /100 WBCS
PLATELET # BLD AUTO: 312 THOUSANDS/UL (ref 149–390)
PMV BLD AUTO: 10.2 FL (ref 8.9–12.7)
RBC # BLD AUTO: 4.25 MILLION/UL (ref 3.81–5.12)
WBC # BLD AUTO: 10.69 THOUSAND/UL (ref 4.31–10.16)

## 2021-07-27 PROCEDURE — 36415 COLL VENOUS BLD VENIPUNCTURE: CPT | Performed by: OBSTETRICS & GYNECOLOGY

## 2021-07-27 PROCEDURE — 85025 COMPLETE CBC W/AUTO DIFF WBC: CPT | Performed by: OBSTETRICS & GYNECOLOGY

## 2021-07-27 PROCEDURE — 99213 OFFICE O/P EST LOW 20 MIN: CPT | Performed by: OBSTETRICS & GYNECOLOGY

## 2021-07-27 PROCEDURE — 82105 ALPHA-FETOPROTEIN SERUM: CPT | Performed by: OBSTETRICS & GYNECOLOGY

## 2021-07-27 PROCEDURE — 82950 GLUCOSE TEST: CPT | Performed by: OBSTETRICS & GYNECOLOGY

## 2021-07-30 LAB
2ND TRIMESTER 4 SCREEN SERPL-IMP: NORMAL
AFP ADJ MOM SERPL: 0.53
AFP INTERP AMN-IMP: NORMAL
AFP INTERP SERPL-IMP: NORMAL
AFP INTERP SERPL-IMP: NORMAL
AFP SERPL-MCNC: 16.7 NG/ML
AGE AT DELIVERY: 30.7 YR
GA METHOD: NORMAL
GA: 17 WEEKS
IDDM PATIENT QL: NO
MULTIPLE PREGNANCY: NO
NEURAL TUBE DEFECT RISK FETUS: NORMAL %

## 2021-08-05 ENCOUNTER — ROUTINE PRENATAL (OUTPATIENT)
Dept: PERINATAL CARE | Facility: OTHER | Age: 31
End: 2021-08-05
Payer: COMMERCIAL

## 2021-08-05 VITALS
WEIGHT: 271.4 LBS | DIASTOLIC BLOOD PRESSURE: 85 MMHG | HEART RATE: 98 BPM | SYSTOLIC BLOOD PRESSURE: 136 MMHG | BODY MASS INDEX: 37.99 KG/M2 | HEIGHT: 71 IN

## 2021-08-05 DIAGNOSIS — O09.899 HISTORY OF PRETERM DELIVERY, CURRENTLY PREGNANT: Primary | ICD-10-CM

## 2021-08-05 DIAGNOSIS — Z3A.18 18 WEEKS GESTATION OF PREGNANCY: ICD-10-CM

## 2021-08-05 PROCEDURE — 76815 OB US LIMITED FETUS(S): CPT | Performed by: OBSTETRICS & GYNECOLOGY

## 2021-08-05 PROCEDURE — 3008F BODY MASS INDEX DOCD: CPT | Performed by: OBSTETRICS & GYNECOLOGY

## 2021-08-05 PROCEDURE — 99213 OFFICE O/P EST LOW 20 MIN: CPT | Performed by: OBSTETRICS & GYNECOLOGY

## 2021-08-05 PROCEDURE — 76817 TRANSVAGINAL US OBSTETRIC: CPT | Performed by: OBSTETRICS & GYNECOLOGY

## 2021-08-05 NOTE — PATIENT INSTRUCTIONS
Thank you for choosing us for your  care today  If you have any questions about your ultrasound or care, please do not hesitate to contact us or your primary obstetrician  Some general instructions for your pregnancy are:     Protect against coronavirus: get vaccinated and mask up  Pregnant women are increased risk of severe COVID  Notify your primary care doctor if you have any symptoms including cough, shortness of breath or difficulty breathing, fever, chills, muscle pain, sore throat, or loss of taste or smell   Exercise: Aim for 22 minutes per day (150 minutes per week) of regular exercise  Walking is great!  Nutrition: aim for calcium-rich and iron-rich foods as well as healthy sources of protein   Learn about Preeclampsia: preeclampsia is a common, serious high blood pressure complication in pregnancy  A blood pressure of 634WNTT (systolic or top number) or 97EOCP (diastolic or bottom number) is not normal and needs evaluation by your doctor  Aspirin is sometimes prescribed in early pregnancy to prevent preeclampsia in women with risk factors - ask your obstetrician if you should be on this medication   If you smoke, try to reduce how many cigarettes you smoke or try to quit completely  Do not vape   Other warning signs to watch out for in pregnancy or postpartum: chest pain, obstructed breathing or shortness of breath, seizures, thoughts of hurting yourself or your baby, bleeding, a painful or swollen leg, fever, or headache (see [employer] POST-BIRTH Warning Signs campaign)  If these happen call 911  Itching is also not normal in pregnancy and if you experience this, especially over your hands and feet, potentially worse at night, notify your doctors

## 2021-08-05 NOTE — PROGRESS NOTES
[address]: Ms Ilana Sinclair was seen today for serial cervical length screening ultrasound  See ultrasound report under "OB Procedures" tab  The time spent on this established patient on the encounter date included 5 minutes previsit service time reviewing records and precharting, 10 minutes face-to-face service time counseling regarding results and coordinating care, and  3 minutes charting, totalling 18 minutes  Please don't hesitate to contact our office with any concerns or questions    Chris Birch MD

## 2021-08-05 NOTE — PROGRESS NOTES
Ultrasound Probe Disinfection    A transvaginal ultrasound was performed  Prior to use, disinfection was performed with High Level Disinfection Process (Trophon)  Probe serial number F2: [id number] was used        Janelle Singer  08/05/21  9:31 AM

## 2021-08-17 NOTE — PROGRESS NOTES
Please refer to the [facility] ultrasound report in Ob Procedures for additional information regarding today's visit

## 2021-08-18 ENCOUNTER — ROUTINE PRENATAL (OUTPATIENT)
Dept: PERINATAL CARE | Facility: CLINIC | Age: 31
End: 2021-08-18
Payer: COMMERCIAL

## 2021-08-18 VITALS
BODY MASS INDEX: 39.69 KG/M2 | HEIGHT: 70 IN | HEART RATE: 91 BPM | WEIGHT: 277.2 LBS | SYSTOLIC BLOOD PRESSURE: 122 MMHG | DIASTOLIC BLOOD PRESSURE: 74 MMHG

## 2021-08-18 DIAGNOSIS — O09.292 PRIOR PREGNANCY WITH PLACENTA ABRUPTION, ANTEPARTUM, SECOND TRIMESTER: ICD-10-CM

## 2021-08-18 DIAGNOSIS — Z3A.20 20 WEEKS GESTATION OF PREGNANCY: Primary | ICD-10-CM

## 2021-08-18 DIAGNOSIS — Z36.86 ENCOUNTER FOR ANTENATAL SCREENING FOR CERVICAL LENGTH: ICD-10-CM

## 2021-08-18 DIAGNOSIS — O09.892 HISTORY OF PREMATURE DELIVERY, CURRENTLY PREGNANT, SECOND TRIMESTER: ICD-10-CM

## 2021-08-18 DIAGNOSIS — O99.212 MATERNAL OBESITY, ANTEPARTUM, SECOND TRIMESTER: ICD-10-CM

## 2021-08-18 PROCEDURE — 76811 OB US DETAILED SNGL FETUS: CPT | Performed by: OBSTETRICS & GYNECOLOGY

## 2021-08-18 PROCEDURE — 76817 TRANSVAGINAL US OBSTETRIC: CPT | Performed by: OBSTETRICS & GYNECOLOGY

## 2021-08-18 PROCEDURE — 99213 OFFICE O/P EST LOW 20 MIN: CPT | Performed by: OBSTETRICS & GYNECOLOGY

## 2021-08-18 NOTE — LETTER
August 18, 2021     Yvetta Hashimoto, [place]    Patient: Kim Hough   YOB: 1990   Date of Visit: 8/18/2021       Dear Dr Myesha Díaz: Thank you for referring Kim Session to me for evaluation  Below are my notes for this consultation  If you have questions, please do not hesitate to call me  I look forward to following your patient along with you  Sincerely,        Kristie Richards MD        CC: No Recipients  Kristie Richards MD  8/17/2021  8:07 AM  Sign when Signing Visit   Please refer to the [facility] ultrasound report in Ob Procedures for additional information regarding today's visit

## 2021-08-18 NOTE — PROGRESS NOTES
Ultrasound Probe Disinfection    A transvaginal ultrasound was performed  Prior to use, disinfection was performed with High Level Disinfection Process (Trophon)  Probe serial number B2: [id number] was used        Ortega Oakley  08/18/21  8:42 AM

## 2021-08-23 PROBLEM — O09.899 HISTORY OF PRETERM DELIVERY, CURRENTLY PREGNANT: Status: RESOLVED | Noted: 2019-10-29 | Resolved: 2021-08-23

## 2021-08-23 PROBLEM — O99.210 OBESITY AFFECTING PREGNANCY: Status: RESOLVED | Noted: 2020-05-29 | Resolved: 2021-08-23

## 2021-08-23 PROBLEM — Z3A.20 20 WEEKS GESTATION OF PREGNANCY: Status: ACTIVE | Noted: 2021-07-22

## 2021-08-23 NOTE — PATIENT INSTRUCTIONS
COVID-19 and Pregnancy   AMBULATORY CARE:   What you need to know about coronavirus disease 2019 (COVID-19) and pregnancy:  Pregnancy increases your risk for severe COVID-19 illness  COVID-19 can also lead to  delivery of your baby  Most babies who become infected with the new virus do not develop serious effects, but some do  It is important for you and your baby to stay safe during pregnancy and delivery  Signs and symptoms of COVID-19 in newborns: The following signs and symptoms may be from COVID-19, but they are also common in newborns  Your 's healthcare provider may recommend testing to confirm or rule out COVID-19  Your  may need a second test if the first is negative  · Fever    · Not moving arms or legs much, or being too sleepy to feed    · A runny nose or cough    · Fast breathing, or trouble breathing    · Vomiting, diarrhea, or not feeding well    If you think you, your baby, or someone in your home may be infected:  Do the following to protect others:  · If emergency care is needed,  tell the  about the possible infection, or call ahead and tell the emergency department  · Call a healthcare provider  for instructions if symptoms are mild  Anyone who may be infected should not  arrive without calling first  The provider will need to protect staff members and other patients  · The person who may be infected needs to wear a face covering  while getting medical care  This will help lower the risk of infecting others  Coverings are not used for anyone who is younger than 2 years, has breathing problems, or cannot remove it  The provider can give you instructions for anyone who cannot wear a covering  Call your local emergency number (911 in the [address] [place] [address],[address]) or go to the emergency department if:   · You have trouble breathing or shortness of breath at rest     · You have chest pain or pressure that lasts longer than 5 minutes      · You become confused or hard to wake     · Your lips or face are blue  · You have a fever of 104°F (40°C) or higher  Call your doctor if:   · You have signs or symptoms of COVID-19  Try to call within 24 hours of when you start to feel sick  · You do not  have symptoms of COVID-19 but had close physical contact within 14 days with someone who tested positive  · You have questions or concerns about your condition or care  How the 2019 coronavirus spreads: The virus spreads quickly and easily  The virus can be passed starting 2 days before symptoms begin or before a positive test if symptoms never begin  The following are ways the virus is thought to spread, but more information may be coming:  · Droplets are the main way all coronaviruses spread  The virus travels in droplets that form when a person talks, coughs, or sneezes  The droplets can also float in the air for minutes or hours  Infection happens when you breathe in the droplets or get them in your eyes or nose  Close personal contact with an infected person increases your risk for infection  This means being within 6 feet (2 meters) of the person for at least 15 minutes over 24 hours  · Person-to-person contact can spread the virus  For example, a person with the virus on his or her hands can spread it by shaking hands with someone  · The virus can stay on objects and surfaces for a short time  You may become infected by touching the object or surface and then touching your eyes or mouth  · An infected animal may be able to infect a person who touches it  This may happen at live markets or on a farm  Protect yourself and your baby while you are pregnant: If you have COVID-19 during your pregnancy, healthcare providers will monitor you and your baby closely  Work with your healthcare provider or obstetrician  If you do not have either, experts recommend you contact a local community health center or health department   The best way to prevent infection is to avoid anyone who is infected, but this can be hard to do  An infected person can spread the virus before signs or symptoms develop, or even if signs or symptoms never develop  The following can help keep you and your baby safe:     · Wash your hands throughout the day  Use soap and water  Rub your soapy hands together, lacing your fingers  Wash the front and back of each hand, and in between your fingers  Use the fingers of one hand to scrub under the fingernails of the other hand  Wash for at least 20 seconds  Rinse with warm, running water for several seconds  Dry your hands with a clean towel or paper towel  Use hand  that contains alcohol if soap and water are not available  If you must go out, wash your hands before you leave your home and when you get home  Wash your hands after you put items away  Be careful about what you touch while you are out  · Protect yourself from sneezes and coughs  Turn your face away and cover your mouth and nose if you are around someone who is sneezing or coughing  This helps protect you from the person's droplets  Cover your mouth and nose with a tissue when you need to sneeze or cough  Use the bend of your arm if you do not have a tissue  Throw the tissue away  Then wash your hands or use hand   · Make a habit of not touching your face  If you get the virus on your hands, you can transfer it to your eyes, nose, or mouth and become infected  · Follow worldwide, national, and local social distancing guidelines  Social distancing means staying far enough away physically from others that the virus cannot spread from one person to another  If you must go out, avoid crowds and large gatherings  Gatherings or crowds of 10 or more individuals can cause the virus to spread  Avoid places such as [place], beaches, sporting events, and tourist attractions   For events such as parties, holiday meals, Jainism services, and conferences, attend virtually (on a computer), if possible  · Wear a face covering (mask) around anyone who does not live in your home  A covering helps protect the person wearing it from being infected or passing the virus to others  Do not  wear a plastic face shield instead of a covering  You can use both together for extra protection  Use a disposable non-medical mask, or make a cloth covering with at least 2 layers  You can also create layers by putting a cloth covering over a disposable non-medical mask  Cover your mouth and your nose  Securely fasten it under your chin and on the sides of your face  A face covering is not a substitute for other safety measures  Continue social distancing and washing your hands often  Do not put a face shield or covering on your   These increase the risk for sudden infant death syndrome (SIDS)  · Stay at least 6 feet (2 meters) away from anyone who does not live in your home  Keep this distance every time you go out of your home and are around another person  Do not shake hands with, hug, or kiss a person as a greeting  Stand or walk as far from others as possible, especially around anyone who is sneezing or coughing  If you must use public transportation (such as a bus or subway), try to sit or stand away from others  Do not go to someone else's home unless it is necessary  Do not go over to visit, even if you are lonely, or the person is  Only go if you need to help him or her  · Stay safe if you must go out to work  Keep physical distance between you and other workers as much as possible  Follow your employer's rules so everyone stays safe  · Clean and disinfect high-touch surfaces and objects in your home often  Use disinfecting wipes or make a solution of 4 teaspoons of bleach in 1 quart (4 cups) of water  Clean surfaces and objects in the room where your baby will be sleeping, especially right before you give birth  Wash your hands after you clean and disinfect   Be careful with cleaning products  Read the labels to make sure they are safe to use during pregnancy  Open windows to make sure you have good ventilation  What you can do to have a healthy pregnancy during the COVID-19 outbreak:   · Keep all prenatal and  appointments  You may be able to have certain prenatal appointments without having to go into the provider's office  Some providers offer phone, video, or other types of appointments  You may also be able to get prescriptions for a few months at a time  This will help lower the number of trips you need to make to the pharmacy for refills  If you do need to go into your provider's office, take precautions  Put a face covering on before you go into the office  Do not stand or sit within 6 feet (2 meters) of anyone in the waiting room, if possible  Do not stand or sit near anyone who is not wearing a face covering  · Get recommended vaccines  Your healthcare provider can tell you if you need vaccines not listed below, and when to get them  ? Ask about the COVID-19 vaccine  Your healthcare provider may recommend that you get the vaccine now if you are at high risk for COVID-19  Make sure you understand the risks and benefits of getting the vaccine during pregnancy  Do not get a COVID-19 vaccine until you and your healthcare provider decide it is right for you  Even after you get the vaccine, continue wearing a face covering, handwashing, and social distancing  These are still the best ways to prevent infection  ? Get the influenza (flu) vaccine  Try to get the vaccine as soon as recommended, usually starting in September or October  ? Get the Tdap vaccine  The Tdap vaccine protects you from tetanus, diphtheria, and pertussis  If possible, get the vaccine during weeks 27 to 36 of your pregnancy  You should get a dose of Tdap with each pregnancy  · Take prenatal vitamins as directed  Your prenatal vitamins should contain folic acid   You need about 600 micrograms (mcg) of folic acid each day during pregnancy  Folic acid helps to form your baby's brain and spinal cord in early pregnancy  · Eat a variety of healthy foods  Healthy foods are important, even if you take a prenatal vitamin  Healthy foods contain nutrients that help keep your immune system strong  Examples of healthy foods include vegetables, fruits, whole-grain breads and cereals, lean meats and poultry, fish, low-fat dairy products, and cooked beans  Do not have raw, undercooked, or unpasteurized food or drinks  Unpasteurized foods are foods that have not gone through the heating process (pasteurization) that destroys bacteria  Your healthcare provider or a dietitian can help you create healthy meal plans  · Talk to your healthcare provider about exercise  Moderate exercise can help keep your immune system strong  Your healthcare provider can help you plan an exercise program that is safe for you during pregnancy  You may need to exercise at home if you cannot exercise outdoors, such as walking in a park  If you want to do pregnancy yoga or other group activities, be safe  Stay at least 6 feet (2 meters) away from others in the class, and the instructor  Wash your hands before you leave the building  Follow the facility's instructions for preventing infections  · Try to lower your stress  You may be feeling more stressed than usual because of the COVID-19 outbreak  You may also feel stress from not being able to share your pregnancy with others  For example, you may not be able to have someone with you during prenatal visits or ultrasounds  Talk to your healthcare providers about ways to manage stress during this time  Pick 1 or 2 times a day to watch the news  Constant news watching about COVID-19 can increase your stress levels  Set a sleep schedule to go to bed and wake up at the same times each day  · Do not smoke cigarettes, drink alcohol, or use drugs    Nicotine and other chemicals in cigarettes and cigars can harm your baby and your health  Alcohol can increase your risk for a miscarriage  Your baby may also be born too small or have other health problems  Certain drugs can be passed to your baby before he or she is born  Some can be passed through breast milk  It is best to quit cigarettes, alcohol, and drugs before you become pregnant and not start again after your baby is born  Ask your healthcare provider for information if you currently use any of these and need help to quit  Protect your  during delivery and while you are in the hospital:  It is not known for sure if an unborn baby can be infected with the virus that causes COVID-19  Some newborns have tested positive for the virus  The newborns may have been infected before, during, or after birth  The greatest risk is for a  to be in close contact with an infected person  Your baby may be tested for the virus soon after being born if you have COVID-23  He or she may be tested again before you leave the hospital  This depends on whether your baby has any signs or symptoms of COVID-19  You will be able to make choices for you and your baby during your hospital stay  Talk to healthcare providers about the following:  · Ask about temporary separation if you have COVID-19  Temporary separation means your  is moved to a different room from you  You will be able to make the decision if you want to do this  Separation will help lower your 's risk for being infected  You will still be able to give your  breast milk  You may need to pump the milk from your breasts  Someone who does not have COVID-19 will then feed the pumped milk to your   You may instead choose to have your baby brought to you when you want to breastfeed  Take precautions to keep your baby safe  Wash your hands and the skin around your nipples before you hold your baby  Wear a face covering while you breastfeed      · Be careful if you have COVID-19 and do not choose temporary separation  Healthcare providers will keep your  at least 6 feet (2 meters) away from you as much as possible  Your  may be placed in an incubator  The incubator will help protect your  from infection  Always wash your hands and put on a face covering when you hold, touch, or have close contact with your   · Ask about visitors  The facility may not be allowing any visitors to newborns during this time  If you are allowed visitors, you may need to limit how many you can have at a time  Do not allow anyone who has known or suspected COVID-19 to visit  Even without signs or symptoms, the person can infect your  or others in the room  All visitors need to wash their hands and put on clean face coverings before entering your room  The face covering needs to stay on during the whole visit  Do not let anyone take the face covering down to make faces at your baby, talk, sneeze, or cough  Do not let anyone kiss you or your baby  Protect your  at home:   · You can choose to continue temporary separation if you have COVID-19  You can do this if an adult who does not have COVID-19 can care for your   Your healthcare provider can give you instructions on how to do this safely at home  Only have close contact with your  when needed  Remember to wash your hands and put on a clean face covering first  You may need to continue pumping your breast milk  A healthy adult can feed the pumped breast milk to your   You may instead choose to have your baby brought to you when you want to breastfeed  Take precautions to keep your baby safe  Wash your hands and the skin around your nipples before you hold your baby  You will also need to wear a face covering while you breastfeed  · Use face coverings safely    Everyone who has COVID-19 needs to wear a clean face covering while being within 6 feet (2 meters) of your   This includes other children in your home who are 2 years or older  Do not put a face covering or plastic face shield on your   Any covering increases your 's risk for sudden infant death syndrome (SIDS)  Do not use coverings on children younger than 2 years or on anyone who has breathing problems or cannot remove it  · Be careful about visitors  Continue precautions you used in the hospital  Do not allow anyone who has known or suspected COVID-19 to come over to see your   Have visitors put on clean face coverings before they enter your home  Have them wash their hands as soon as they come in  The face covering needs to stay on during the whole visit  · Keep all checkup appointments  You may be able to have some appointments by phone or video meeting  Other appointments will need to be in person, such as for vaccines  Vaccines are normally given to babies at certain ages  Until COVID-19 is under control, your 's provider will give you a vaccine schedule  It is important for your  to get all recommended vaccines  What you need to know about breastfeeding:  Breastfeeding for the first 6 months decreases your baby's risk for respiratory (lung) infections, allergies, asthma, and stomach problems  Breast milk also helps your baby develop a strong immune system  Breast milk is considered safe, even if you have COVID-19  Experts currently believe the virus that causes COVID-19 does not spread in breast milk  Do the following to help protect your baby:  · Wash your hands before every breastfeeding or pumping session  Even if you do not have COVID-19, you can transfer the virus from your hands to your baby or the pump  Use soap and water to wash your hands whenever possible  Use hand  that contains alcohol if soap and water are not available  · Clean and sanitize your breast pump after each use    Follow the 's directions for cleaning and sanitizing the pump  It is important not to use it until it is clean and sanitized  · If you have COVID-19:      ? Wear a face covering while you breastfeed or pump  This will help prevent you from passing the virus through droplets when you talk, cough, sneeze, or laugh  The virus can stay on surfaces such as a breast pump for hours to days  ? Have someone who is not infected bottle feed your baby, if possible  Have the person wash his or her hands with soap and water before each feeding  The person can feed your  pumped breast milk or formula  Follow up with your doctor or obstetrician as directed:  Write down your questions so you remember to ask them during your visits  For more information:   · Centers for Disease Control and Prevention  [address] , [address]  Phone: 1- 936 - 983-3742  Web Address: [employer]     © [address] [place] [address]  Information is for End User's use only and may not be sold, redistributed or otherwise used for commercial purposes  All illustrations and images included in CareNotes® are the copyrighted property of A D A M , Inc  or [facility] [employer]   The above information is an  only  It is not intended as medical advice for individual conditions or treatments  Talk to your doctor, nurse or pharmacist before following any medical regimen to see if it is safe and effective for you  Pregnancy at 23 to 26 [address]:   What changes are happening in my body? You are now close to or at the beginning of the third trimester  The third trimester starts at 24 weeks and ends with delivery  As your baby gets larger, you may develop certain symptoms  These may include pain in your back or down the sides of your abdomen  You may also have stretch marks on your abdomen, breasts, thighs, or buttocks  You may also have constipation  How do I care for myself at this stage of my pregnancy?    · Eat a variety of healthy foods   Healthy foods include fruits, vegetables, whole-grain breads, low-fat dairy foods, beans, lean meats, and fish  Drink liquids as directed  Ask how much liquid to drink each day and which liquids are best for you  Limit caffeine to less than 200 milligrams each day  Limit your intake of fish to 2 servings each week  Choose fish low in mercury such as canned light tuna, shrimp, salmon, cod, or tilapia  Do not  eat fish high in mercury such as swordfish, tilefish, michelle mackerel, and shark  · Manage back pain  Do not stand for long periods of time or lift heavy items  Use good posture while you stand, squat, or bend  Wear low-heeled shoes with good support  Rest may also help to relieve back pain  Ask your healthcare provider about exercises you can do to strengthen your back muscles  · Take prenatal vitamins as directed  Your need for certain vitamins and minerals, such as folic acid, increases during pregnancy  Prenatal vitamins provide some of the extra vitamins and minerals you need  Prenatal vitamins may also help to decrease the risk of certain birth defects  · Talk to your healthcare provider about exercise  Moderate exercise can help you stay fit  Your healthcare provider will help you plan an exercise program that is safe for you during pregnancy  · Do not smoke  Smoking increases your risk of a miscarriage and other health problems during your pregnancy  Smoking can cause your baby to be born too early or weigh less at birth  Ask your healthcare provider for information if you need help quitting  · Do not drink alcohol  Alcohol passes from your body to your baby through the placenta  It can affect your baby's brain development and cause fetal alcohol syndrome (FAS)  FAS is a group of conditions that causes mental, behavior, and growth problems  · Talk to your healthcare provider before you take any medicines    Many medicines may harm your baby if you take them when you are pregnant  Do not take any medicines, vitamins, herbs, or supplements without first talking to your healthcare provider  Never use illegal or street drugs (such as marijuana or cocaine) while you are pregnant  What are some safety tips during pregnancy? · Avoid hot tubs and saunas  Do not use a hot tub or sauna while you are pregnant, especially during your first trimester  Hot tubs and saunas may raise your baby's temperature and increase the risk of birth defects  · Avoid toxoplasmosis  This is an infection caused by eating raw meat or being around infected cat feces  It can cause birth defects, miscarriages, and other problems  Wash your hands after you touch raw meat  Make sure any meat is well-cooked before you eat it  Avoid raw eggs and unpasteurized milk  Use gloves or ask someone else to clean your cat's litter box while you are pregnant  What changes are happening with my baby? By 26 weeks, your baby will weigh about 2 pounds  Your baby will be about 10 inches long from the top of the head to the rump (baby's bottom)  Your baby's movements are much stronger now  Your baby's eyes are almost completely formed and can partially open  Your baby also sleeps and wakes up  What do I need to know about prenatal care? Your healthcare provider will check your blood pressure and weight  You may also need the following:  · A urine test  may also be done to check for sugar and protein  These can be signs of gestational diabetes or infection  Protein in your urine may also be a sign of preeclampsia  Preeclampsia is a condition that can develop during week 20 or later of your pregnancy  It causes high blood pressure, and it can cause problems with your kidneys and other organs  · Fundal height  is a measurement of your uterus to check your baby's growth  This number is usually the same as the number of weeks that you have been pregnant  · Your baby's heart rate  will be checked      When should I seek immediate care? · You develop a severe headache that does not go away  · You have new or increased vision changes, such as blurred or spotted vision  · You have new or increased swelling in your face or hands  · You have vaginal spotting or bleeding  · Your water broke or you feel warm water gushing or trickling from your vagina  When should I contact my healthcare provider? · You have abdominal cramps, pressure, or tightening  · You have a change in vaginal discharge  · You have light bleeding  · You have chills or a fever  · You have vaginal itching, burning, or pain  · You have yellow, green, white, or foul-smelling vaginal discharge  · You have pain or burning when you urinate, less urine than usual, or pink or bloody urine  · You have questions or concerns about your condition or care  CARE AGREEMENT:   You have the right to help plan your care  Learn about your health condition and how it may be treated  Discuss treatment options with your healthcare providers to decide what care you want to receive  You always have the right to refuse treatment  The above information is an  only  It is not intended as medical advice for individual conditions or treatments  Talk to your doctor, nurse or pharmacist before following any medical regimen to see if it is safe and effective for you  © Copyright [employer] 2021 Information is for End User's use only and may not be sold, redistributed or otherwise used for commercial purposes  All illustrations and images included in CareNotes® are the copyrighted property of A D A M , Inc  or [address]  Pregnancy at 23 to [address] 63:   What changes are happening in my body? Now that you are in your second trimester, you have more energy  You may also be feeling hungrier than usual  You may be gaining about ½ to 1 pound a week, and your pregnancy is beginning to show   You may need to start wearing maternity clothes  As your baby gets larger, you may have other symptoms  These may include body aches or stretch marks on your abdomen, breasts, thighs, or buttocks  How do I care for myself at this stage of my pregnancy? · Eat a variety of healthy foods  Healthy foods include fruits, vegetables, whole-grain breads, low-fat dairy foods, beans, lean meats, and fish  Drink liquids as directed  Ask how much liquid to drink each day and which liquids are best for you  Limit caffeine to less than 200 milligrams each day  Limit your intake of fish to 2 servings each week  Choose fish low in mercury such as canned light tuna, shrimp, salmon, cod, or tilapia  Do not  eat fish high in mercury such as swordfish, tilefish, michelle mackerel, and shark  · Take prenatal vitamins as directed  Your need for certain vitamins and minerals, such as folic acid, increases during pregnancy  Prenatal vitamins provide some of the extra vitamins and minerals you need  Prenatal vitamins may also help to decrease the risk of certain birth defects  · Talk to your healthcare provider about exercise  Moderate exercise can help you stay fit  Your healthcare provider will help you plan an exercise program that is safe for you during pregnancy  · Do not smoke  Smoking increases your risk of a miscarriage and other health problems during your pregnancy  Smoking can cause your baby to be born too early or weigh less at birth  Ask your healthcare provider for information if you need help quitting  · Do not drink alcohol  Alcohol passes from your body to your baby through the placenta  It can affect your baby's brain development and cause fetal alcohol syndrome (FAS)  FAS is a group of conditions that causes mental, behavior, and growth problems  · Talk to your healthcare provider before you take any medicines  Many medicines may harm your baby if you take them when you are pregnant   Do not take any medicines, vitamins, herbs, or supplements without first talking to your healthcare provider  Never use illegal or street drugs (such as marijuana or cocaine) while you are pregnant  What are some safety tips during pregnancy? · Avoid hot tubs and saunas  Do not use a hot tub or sauna while you are pregnant, especially during your first trimester  Hot tubs and saunas may raise your baby's temperature and increase the risk of birth defects  · Avoid toxoplasmosis  This is an infection caused by eating raw meat or being around infected cat feces  It can cause birth defects, miscarriages, and other problems  Wash your hands after you touch raw meat  Make sure any meat is well-cooked before you eat it  Avoid raw eggs and unpasteurized milk  Use gloves or ask someone else to clean your cat's litter box while you are pregnant  What changes are happening with my baby? By 22 weeks, your baby is about 8 inches long from the top of the head to the rump (baby's bottom)  Your baby also weighs about 1 pound  Your baby is becoming much more active  You may be able to feel the baby move inside you now  The first movements may not be that noticeable  They may feel like a fluttering sensation  As time goes on, your baby's movements will become stronger and more noticeable  What do I need to know about prenatal care? During the first 28 weeks of your pregnancy, you will see your healthcare provider once a month  Your healthcare provider will check your blood pressure and weight  You may also need the following:  · A urine test  may also be done to check for sugar and protein  These can be signs of gestational diabetes or infection  Protein in your urine may also be a sign of preeclampsia  Preeclampsia is a condition that can develop during week 20 or later of your pregnancy  It causes high blood pressure, and it can cause problems with your kidneys and other organs       · Fundal height  is a measurement of your uterus to check your baby's growth  This number is usually the same as the number of weeks that you have been pregnant  · A fetal ultrasound  shows pictures of your baby inside your uterus  It shows your baby's development  The movement and position of your baby can also be seen  Your healthcare provider may be able to tell you what your baby's gender is during the ultrasound  · Your baby's heart rate  will be checked  When should I seek immediate care? · You develop a severe headache that does not go away  · You have new or increased vision changes, such as blurred or spotted vision  · You have new or increased swelling in your face or hands  · You have vaginal spotting or bleeding  · Your water broke or you feel warm water gushing or trickling from your vagina  When should I contact my healthcare provider? · You have abdominal cramps, pressure, or tightening  · You have a change in vaginal discharge  · You cannot keep food or drinks down, and you are losing weight  · You have chills or a fever  · You have vaginal itching, burning, or pain  · You have yellow, green, white, or foul-smelling vaginal discharge  · You have pain or burning when you urinate, less urine than usual, or pink or bloody urine  · You have questions or concerns about your condition or care  CARE AGREEMENT:   You have the right to help plan your care  Learn about your health condition and how it may be treated  Discuss treatment options with your healthcare providers to decide what care you want to receive  You always have the right to refuse treatment  The above information is an  only  It is not intended as medical advice for individual conditions or treatments  Talk to your doctor, nurse or pharmacist before following any medical regimen to see if it is safe and effective for you    © Copyright [employer] 2021 Information is for End User's use only and may not be sold, redistributed or otherwise used for commercial purposes   All illustrations and images included in CareNotes® are the copyrighted property of A D A M , Inc  or [facility] Russell Perez

## 2021-08-24 ENCOUNTER — ROUTINE PRENATAL (OUTPATIENT)
Dept: OBGYN CLINIC | Facility: MEDICAL CENTER | Age: 31
End: 2021-08-24

## 2021-08-24 VITALS — BODY MASS INDEX: 39.89 KG/M2 | DIASTOLIC BLOOD PRESSURE: 70 MMHG | WEIGHT: 278 LBS | SYSTOLIC BLOOD PRESSURE: 120 MMHG

## 2021-08-24 DIAGNOSIS — F32.A DEPRESSION AFFECTING PREGNANCY IN SECOND TRIMESTER, ANTEPARTUM: ICD-10-CM

## 2021-08-24 DIAGNOSIS — Z3A.20 20 WEEKS GESTATION OF PREGNANCY: ICD-10-CM

## 2021-08-24 DIAGNOSIS — O09.292 PRIOR PREGNANCY WITH PLACENTA ABRUPTION, ANTEPARTUM, SECOND TRIMESTER: Primary | ICD-10-CM

## 2021-08-24 DIAGNOSIS — O99.342 DEPRESSION AFFECTING PREGNANCY IN SECOND TRIMESTER, ANTEPARTUM: ICD-10-CM

## 2021-08-24 DIAGNOSIS — O09.892 HISTORY OF PREMATURE DELIVERY, CURRENTLY PREGNANT, SECOND TRIMESTER: ICD-10-CM

## 2021-08-24 DIAGNOSIS — O99.212 MATERNAL OBESITY, ANTEPARTUM, SECOND TRIMESTER: ICD-10-CM

## 2021-08-24 PROCEDURE — PNV: Performed by: NURSE PRACTITIONER

## 2021-08-24 NOTE — PROGRESS NOTES
Denies loss of fluid, vaginal bleeding and abdominal pain  Confirms fetal movement  Tolerating prenatal vitamin, Zoloft and low-dose aspirin well  Denies worsening symptoms, thoughts of self-harm or harm to others states she is doing well with Zoloft  Is taking Claritin as needed for seasonal allergies  Had level 2 ultrasound has repeat ultrasound 21 to complete anatomy  Denies other questions or concerns at today's visit   BP: 120/70    Weight: +23lb    Plan:   -Continue prenatal vitamins and low-dose aspirin daily  - follow-up ultrasound scheduled for 21  - depression in pregnancy continue Zoloft daily  Encouraged to call office with worsening symptoms, thoughts of self-harm or harm to others  - common discomforts of pregnancy and precautions including  labor reviewed  Signs and symptoms report reviewed    Written information provided about COVID-19  RTO 4 weeks f/u US

## 2021-08-31 ENCOUNTER — ROUTINE PRENATAL (OUTPATIENT)
Dept: PERINATAL CARE | Facility: OTHER | Age: 31
End: 2021-08-31
Payer: COMMERCIAL

## 2021-08-31 VITALS
WEIGHT: 283.8 LBS | DIASTOLIC BLOOD PRESSURE: 82 MMHG | BODY MASS INDEX: 40.63 KG/M2 | HEART RATE: 105 BPM | HEIGHT: 70 IN | SYSTOLIC BLOOD PRESSURE: 121 MMHG

## 2021-08-31 DIAGNOSIS — Q27.0 SINGLE UMBILICAL ARTERY: ICD-10-CM

## 2021-08-31 DIAGNOSIS — Z36.2 ENCOUNTER FOR FOLLOW-UP ULTRASOUND OF FETAL ANATOMY: ICD-10-CM

## 2021-08-31 DIAGNOSIS — Z3A.22 22 WEEKS GESTATION OF PREGNANCY: Primary | ICD-10-CM

## 2021-08-31 DIAGNOSIS — Z36.86 ENCOUNTER FOR ANTENATAL SCREENING FOR CERVICAL LENGTH: ICD-10-CM

## 2021-08-31 PROCEDURE — 76815 OB US LIMITED FETUS(S): CPT | Performed by: OBSTETRICS & GYNECOLOGY

## 2021-08-31 PROCEDURE — 76817 TRANSVAGINAL US OBSTETRIC: CPT | Performed by: OBSTETRICS & GYNECOLOGY

## 2021-08-31 PROCEDURE — 3008F BODY MASS INDEX DOCD: CPT | Performed by: OBSTETRICS & GYNECOLOGY

## 2021-08-31 PROCEDURE — 99213 OFFICE O/P EST LOW 20 MIN: CPT | Performed by: OBSTETRICS & GYNECOLOGY

## 2021-08-31 NOTE — LETTER
August 31, 2021     Josh Hernandez, [address]    Patient: Zarina Jyo   YOB: 1990   Date of Visit: 8/31/2021       Dear Rangel Ponce: Thank you for referring Zarina Joy to me for evaluation  Below are my notes for this consultation  If you have questions, please do not hesitate to call me  I look forward to following your patient along with you  Sincerely,        Norberto Norwood MD        CC: No Recipients  Norberto Norwood MD  8/31/2021  2:30 PM  Sign when Signing Visit  [address]: Ms Liza Wilson was seen today at 22w0d for transvaginal ultrasound and follow-up of missed anatomy  See ultrasound report under "OB Procedures" tab    Please don't hesitate to contact our office with any concerns or questions   -Norberto Norwood MD

## 2021-08-31 NOTE — PROGRESS NOTES
[address]: Ms Jace Gardiner was seen today at 22w0d for transvaginal ultrasound and follow-up of missed anatomy  See ultrasound report under "OB Procedures" tab    Please don't hesitate to contact our office with any concerns or questions   -Jason Hurst MD

## 2021-09-21 ENCOUNTER — OFFICE VISIT (OUTPATIENT)
Dept: FAMILY MEDICINE CLINIC | Facility: HOSPITAL | Age: 31
End: 2021-09-21
Payer: COMMERCIAL

## 2021-09-21 VITALS
DIASTOLIC BLOOD PRESSURE: 62 MMHG | HEART RATE: 98 BPM | WEIGHT: 289.8 LBS | BODY MASS INDEX: 41.49 KG/M2 | SYSTOLIC BLOOD PRESSURE: 128 MMHG | HEIGHT: 70 IN | OXYGEN SATURATION: 100 % | TEMPERATURE: 97.9 F

## 2021-09-21 DIAGNOSIS — F33.0 MILD EPISODE OF RECURRENT MAJOR DEPRESSIVE DISORDER (HCC): ICD-10-CM

## 2021-09-21 DIAGNOSIS — F41.1 GENERALIZED ANXIETY DISORDER: Primary | ICD-10-CM

## 2021-09-21 DIAGNOSIS — F98.8 ADD (ATTENTION DEFICIT DISORDER) WITHOUT HYPERACTIVITY: ICD-10-CM

## 2021-09-21 PROCEDURE — 99213 OFFICE O/P EST LOW 20 MIN: CPT | Performed by: NURSE PRACTITIONER

## 2021-09-21 PROCEDURE — 1036F TOBACCO NON-USER: CPT | Performed by: NURSE PRACTITIONER

## 2021-09-21 PROCEDURE — 3725F SCREEN DEPRESSION PERFORMED: CPT | Performed by: NURSE PRACTITIONER

## 2021-09-21 PROCEDURE — 3008F BODY MASS INDEX DOCD: CPT | Performed by: NURSE PRACTITIONER

## 2021-09-21 NOTE — PROGRESS NOTES
Assessment/Plan:    Mild episode of recurrent major depressive disorder ([facility] [address] )  Well controlled in pregnancy  Continue on current regimen  Discussed postpartum depression symptoms vs baby blues and to be aware and call should these symptoms occur  F/U in 6 months  Generalized anxiety disorder  Well controlled  Continue on current sertraline dose  ADD (attention deficit disorder) without hyperactivity  Currently off Vyvanse due to pregnancy  Diagnoses and all orders for this visit:    Generalized anxiety disorder    Mild episode of recurrent major depressive disorder ([facility] [address] )    ADD (attention deficit disorder) without hyperactivity          Subjective:      Patient ID: Sophy Varma is a 28 y o  female  7 months pregnant  Doing well  Having a girl  Off Vyvanse for now  Still on 25 mg sertraline and overall mood is very good  Anxiety has been very well controlled  The following portions of the patient's history were reviewed and updated as appropriate: allergies, current medications, past family history, past medical history, past social history, past surgical history and problem list     Review of Systems   Constitutional: Negative for activity change, appetite change and fatigue  Psychiatric/Behavioral: Negative for dysphoric mood, sleep disturbance and suicidal ideas  The patient is not nervous/anxious  Objective:  Vitals:    09/21/21 1520   BP: 128/62   Pulse: 98   Temp: 97 9 °F (36 6 °C)   SpO2: 100%      Physical Exam  Vitals reviewed  Constitutional:       Appearance: Normal appearance  Cardiovascular:      Rate and Rhythm: Normal rate and regular rhythm  Heart sounds: Normal heart sounds  Pulmonary:      Effort: Pulmonary effort is normal       Breath sounds: Normal breath sounds  Skin:     General: Skin is warm and dry  Neurological:      Mental Status: She is alert and oriented to person, place, and time     Psychiatric:         Mood and Affect: Mood normal          Behavior: Behavior normal          Thought Content:  Thought content normal          Judgment: Judgment normal

## 2021-09-21 NOTE — ASSESSMENT & PLAN NOTE
Well controlled in pregnancy  Continue on current regimen  Discussed postpartum depression symptoms vs baby blues and to be aware and call should these symptoms occur  F/U in 6 months

## 2021-10-10 DIAGNOSIS — F41.1 GENERALIZED ANXIETY DISORDER: ICD-10-CM

## 2021-10-10 RX ORDER — SERTRALINE HYDROCHLORIDE 25 MG/1
TABLET, FILM COATED ORAL
Qty: 90 TABLET | Refills: 1 | Status: SHIPPED | OUTPATIENT
Start: 2021-10-10 | End: 2022-07-23

## 2021-10-11 ENCOUNTER — ROUTINE PRENATAL (OUTPATIENT)
Dept: OBGYN CLINIC | Facility: MEDICAL CENTER | Age: 31
End: 2021-10-11
Payer: COMMERCIAL

## 2021-10-11 VITALS — BODY MASS INDEX: 42.04 KG/M2 | SYSTOLIC BLOOD PRESSURE: 120 MMHG | WEIGHT: 293 LBS | DIASTOLIC BLOOD PRESSURE: 78 MMHG

## 2021-10-11 DIAGNOSIS — O99.212 MATERNAL OBESITY, ANTEPARTUM, SECOND TRIMESTER: ICD-10-CM

## 2021-10-11 DIAGNOSIS — Z23 NEED FOR TDAP VACCINATION: ICD-10-CM

## 2021-10-11 DIAGNOSIS — O09.292 PRIOR PREGNANCY WITH PLACENTA ABRUPTION, ANTEPARTUM, SECOND TRIMESTER: ICD-10-CM

## 2021-10-11 DIAGNOSIS — Z34.92 SECOND TRIMESTER PREGNANCY: ICD-10-CM

## 2021-10-11 DIAGNOSIS — Z23 NEED FOR INFLUENZA VACCINATION: ICD-10-CM

## 2021-10-11 DIAGNOSIS — O09.892 HISTORY OF PREMATURE DELIVERY, CURRENTLY PREGNANT, SECOND TRIMESTER: ICD-10-CM

## 2021-10-11 DIAGNOSIS — Z3A.27 27 WEEKS GESTATION OF PREGNANCY: Primary | ICD-10-CM

## 2021-10-11 LAB
BASOPHILS # BLD AUTO: 0.03 THOUSANDS/ΜL (ref 0–0.1)
BASOPHILS NFR BLD AUTO: 0 % (ref 0–1)
EOSINOPHIL # BLD AUTO: 0.05 THOUSAND/ΜL (ref 0–0.61)
EOSINOPHIL NFR BLD AUTO: 0 % (ref 0–6)
ERYTHROCYTE [DISTWIDTH] IN BLOOD BY AUTOMATED COUNT: 13.7 % (ref 11.6–15.1)
GLUCOSE 1H P 50 G GLC PO SERPL-MCNC: 87 MG/DL (ref 40–134)
HCT VFR BLD AUTO: 34.9 % (ref 34.8–46.1)
HGB BLD-MCNC: 11.3 G/DL (ref 11.5–15.4)
IMM GRANULOCYTES # BLD AUTO: 0.05 THOUSAND/UL (ref 0–0.2)
IMM GRANULOCYTES NFR BLD AUTO: 0 % (ref 0–2)
LYMPHOCYTES # BLD AUTO: 1.9 THOUSANDS/ΜL (ref 0.6–4.47)
LYMPHOCYTES NFR BLD AUTO: 17 % (ref 14–44)
MCH RBC QN AUTO: 30.5 PG (ref 26.8–34.3)
MCHC RBC AUTO-ENTMCNC: 32.4 G/DL (ref 31.4–37.4)
MCV RBC AUTO: 94 FL (ref 82–98)
MONOCYTES # BLD AUTO: 0.75 THOUSAND/ΜL (ref 0.17–1.22)
MONOCYTES NFR BLD AUTO: 7 % (ref 4–12)
NEUTROPHILS # BLD AUTO: 8.46 THOUSANDS/ΜL (ref 1.85–7.62)
NEUTS SEG NFR BLD AUTO: 76 % (ref 43–75)
NRBC BLD AUTO-RTO: 0 /100 WBCS
PLATELET # BLD AUTO: 256 THOUSANDS/UL (ref 149–390)
PMV BLD AUTO: 10.5 FL (ref 8.9–12.7)
RBC # BLD AUTO: 3.71 MILLION/UL (ref 3.81–5.12)
WBC # BLD AUTO: 11.24 THOUSAND/UL (ref 4.31–10.16)

## 2021-10-11 PROCEDURE — 85025 COMPLETE CBC W/AUTO DIFF WBC: CPT | Performed by: OBSTETRICS & GYNECOLOGY

## 2021-10-11 PROCEDURE — 90686 IIV4 VACC NO PRSV 0.5 ML IM: CPT

## 2021-10-11 PROCEDURE — 36415 COLL VENOUS BLD VENIPUNCTURE: CPT | Performed by: OBSTETRICS & GYNECOLOGY

## 2021-10-11 PROCEDURE — 82950 GLUCOSE TEST: CPT | Performed by: OBSTETRICS & GYNECOLOGY

## 2021-10-11 PROCEDURE — 90472 IMMUNIZATION ADMIN EACH ADD: CPT

## 2021-10-11 PROCEDURE — 90715 TDAP VACCINE 7 YRS/> IM: CPT

## 2021-10-11 PROCEDURE — PNV: Performed by: OBSTETRICS & GYNECOLOGY

## 2021-10-11 PROCEDURE — 90471 IMMUNIZATION ADMIN: CPT

## 2021-10-12 PROBLEM — O09.899 SINGLE UMBILICAL ARTERY AFFECTING MANAGEMENT OF MOTHER IN SINGLETON PREGNANCY, ANTEPARTUM: Status: ACTIVE | Noted: 2021-08-31

## 2021-10-12 PROBLEM — O09.293 PRIOR PREGNANCY WITH PLACENTA ABRUPTION IN THIRD TRIMESTER, ANTEPARTUM: Status: ACTIVE | Noted: 2021-08-18

## 2021-10-14 ENCOUNTER — ULTRASOUND (OUTPATIENT)
Dept: PERINATAL CARE | Facility: OTHER | Age: 31
End: 2021-10-14
Payer: COMMERCIAL

## 2021-10-14 VITALS
BODY MASS INDEX: 41.95 KG/M2 | HEIGHT: 70 IN | HEART RATE: 101 BPM | WEIGHT: 293 LBS | DIASTOLIC BLOOD PRESSURE: 86 MMHG | SYSTOLIC BLOOD PRESSURE: 124 MMHG

## 2021-10-14 DIAGNOSIS — Z36.2 ENCOUNTER FOR FOLLOW-UP ULTRASOUND OF FETAL ANATOMY: ICD-10-CM

## 2021-10-14 DIAGNOSIS — O09.899 SINGLE UMBILICAL ARTERY AFFECTING MANAGEMENT OF MOTHER IN SINGLETON PREGNANCY, ANTEPARTUM: Primary | ICD-10-CM

## 2021-10-14 DIAGNOSIS — Z36.89 ENCOUNTER FOR ULTRASOUND TO CHECK FETAL GROWTH: ICD-10-CM

## 2021-10-14 DIAGNOSIS — O09.293 PRIOR PREGNANCY WITH PLACENTA ABRUPTION IN THIRD TRIMESTER, ANTEPARTUM: ICD-10-CM

## 2021-10-14 PROCEDURE — 76816 OB US FOLLOW-UP PER FETUS: CPT | Performed by: OBSTETRICS & GYNECOLOGY

## 2021-10-14 PROCEDURE — 3008F BODY MASS INDEX DOCD: CPT | Performed by: OBSTETRICS & GYNECOLOGY

## 2021-10-14 PROCEDURE — 99213 OFFICE O/P EST LOW 20 MIN: CPT | Performed by: OBSTETRICS & GYNECOLOGY

## 2021-10-21 PROBLEM — O09.893 HISTORY OF PREMATURE DELIVERY, CURRENTLY PREGNANT, THIRD TRIMESTER: Status: ACTIVE | Noted: 2021-08-18

## 2021-10-21 PROBLEM — O99.213 MATERNAL OBESITY SYNDROME IN THIRD TRIMESTER: Status: ACTIVE | Noted: 2021-08-18

## 2021-10-21 PROBLEM — Z3A.29 29 WEEKS GESTATION OF PREGNANCY: Status: ACTIVE | Noted: 2021-07-22

## 2021-10-25 ENCOUNTER — ROUTINE PRENATAL (OUTPATIENT)
Dept: OBGYN CLINIC | Facility: MEDICAL CENTER | Age: 31
End: 2021-10-25

## 2021-10-25 VITALS — BODY MASS INDEX: 42.33 KG/M2 | WEIGHT: 293 LBS | DIASTOLIC BLOOD PRESSURE: 70 MMHG | SYSTOLIC BLOOD PRESSURE: 116 MMHG

## 2021-10-25 DIAGNOSIS — O09.293 PRIOR PREGNANCY WITH PLACENTA ABRUPTION IN THIRD TRIMESTER, ANTEPARTUM: ICD-10-CM

## 2021-10-25 DIAGNOSIS — Z3A.29 29 WEEKS GESTATION OF PREGNANCY: ICD-10-CM

## 2021-10-25 DIAGNOSIS — O09.893 HISTORY OF PREMATURE DELIVERY, CURRENTLY PREGNANT, THIRD TRIMESTER: ICD-10-CM

## 2021-10-25 DIAGNOSIS — O99.213 MATERNAL OBESITY SYNDROME IN THIRD TRIMESTER: ICD-10-CM

## 2021-10-25 DIAGNOSIS — O09.899 SINGLE UMBILICAL ARTERY AFFECTING MANAGEMENT OF MOTHER IN SINGLETON PREGNANCY, ANTEPARTUM: Primary | ICD-10-CM

## 2021-10-25 PROCEDURE — PNV: Performed by: NURSE PRACTITIONER

## 2021-11-08 ENCOUNTER — ROUTINE PRENATAL (OUTPATIENT)
Dept: OBGYN CLINIC | Facility: MEDICAL CENTER | Age: 31
End: 2021-11-08

## 2021-11-08 VITALS — SYSTOLIC BLOOD PRESSURE: 110 MMHG | WEIGHT: 293 LBS | BODY MASS INDEX: 42.76 KG/M2 | DIASTOLIC BLOOD PRESSURE: 80 MMHG

## 2021-11-08 DIAGNOSIS — F33.0 MILD EPISODE OF RECURRENT MAJOR DEPRESSIVE DISORDER (HCC): ICD-10-CM

## 2021-11-08 DIAGNOSIS — O09.899 SINGLE UMBILICAL ARTERY AFFECTING MANAGEMENT OF MOTHER IN SINGLETON PREGNANCY, ANTEPARTUM: ICD-10-CM

## 2021-11-08 DIAGNOSIS — Z3A.31 31 WEEKS GESTATION OF PREGNANCY: Primary | ICD-10-CM

## 2021-11-08 PROBLEM — O09.893 HISTORY OF PREMATURE DELIVERY, CURRENTLY PREGNANT, THIRD TRIMESTER: Status: RESOLVED | Noted: 2021-08-18 | Resolved: 2021-11-08

## 2021-11-08 PROCEDURE — PNV: Performed by: STUDENT IN AN ORGANIZED HEALTH CARE EDUCATION/TRAINING PROGRAM

## 2021-11-23 PROBLEM — Z3A.34 34 WEEKS GESTATION OF PREGNANCY: Status: ACTIVE | Noted: 2021-07-22

## 2021-11-24 ENCOUNTER — ROUTINE PRENATAL (OUTPATIENT)
Dept: OBGYN CLINIC | Facility: MEDICAL CENTER | Age: 31
End: 2021-11-24

## 2021-11-24 VITALS — SYSTOLIC BLOOD PRESSURE: 120 MMHG | BODY MASS INDEX: 43.62 KG/M2 | WEIGHT: 293 LBS | DIASTOLIC BLOOD PRESSURE: 80 MMHG

## 2021-11-24 DIAGNOSIS — Z3A.34 34 WEEKS GESTATION OF PREGNANCY: Primary | ICD-10-CM

## 2021-11-24 DIAGNOSIS — O09.899 SINGLE UMBILICAL ARTERY AFFECTING MANAGEMENT OF MOTHER IN SINGLETON PREGNANCY, ANTEPARTUM: ICD-10-CM

## 2021-11-24 DIAGNOSIS — F33.0 MILD EPISODE OF RECURRENT MAJOR DEPRESSIVE DISORDER (HCC): ICD-10-CM

## 2021-11-24 PROCEDURE — PNV: Performed by: OBSTETRICS & GYNECOLOGY

## 2021-11-26 ENCOUNTER — ULTRASOUND (OUTPATIENT)
Dept: PERINATAL CARE | Facility: OTHER | Age: 31
End: 2021-11-26
Payer: COMMERCIAL

## 2021-11-26 VITALS
DIASTOLIC BLOOD PRESSURE: 78 MMHG | BODY MASS INDEX: 41.95 KG/M2 | SYSTOLIC BLOOD PRESSURE: 116 MMHG | WEIGHT: 293 LBS | HEIGHT: 70 IN

## 2021-11-26 DIAGNOSIS — O36.63X0 MACROSOMIA OF FETUS AFFECTING MANAGEMENT OF MOTHER IN THIRD TRIMESTER, SINGLE OR UNSPECIFIED FETUS: Primary | ICD-10-CM

## 2021-11-26 DIAGNOSIS — Z3A.34 34 WEEKS GESTATION OF PREGNANCY: ICD-10-CM

## 2021-11-26 DIAGNOSIS — O99.213 MATERNAL OBESITY SYNDROME IN THIRD TRIMESTER: ICD-10-CM

## 2021-11-26 DIAGNOSIS — O09.899 SINGLE UMBILICAL ARTERY AFFECTING MANAGEMENT OF MOTHER IN SINGLETON PREGNANCY, ANTEPARTUM: ICD-10-CM

## 2021-11-26 PROCEDURE — 3008F BODY MASS INDEX DOCD: CPT | Performed by: OBSTETRICS & GYNECOLOGY

## 2021-11-26 PROCEDURE — 99214 OFFICE O/P EST MOD 30 MIN: CPT | Performed by: OBSTETRICS & GYNECOLOGY

## 2021-11-26 PROCEDURE — 76816 OB US FOLLOW-UP PER FETUS: CPT | Performed by: OBSTETRICS & GYNECOLOGY

## 2021-11-26 PROCEDURE — 76819 FETAL BIOPHYS PROFIL W/O NST: CPT | Performed by: OBSTETRICS & GYNECOLOGY

## 2021-12-02 ENCOUNTER — ULTRASOUND (OUTPATIENT)
Dept: PERINATAL CARE | Facility: OTHER | Age: 31
End: 2021-12-02
Payer: COMMERCIAL

## 2021-12-02 VITALS
HEIGHT: 70 IN | BODY MASS INDEX: 41.95 KG/M2 | DIASTOLIC BLOOD PRESSURE: 87 MMHG | HEART RATE: 105 BPM | SYSTOLIC BLOOD PRESSURE: 130 MMHG | WEIGHT: 293 LBS

## 2021-12-02 DIAGNOSIS — O09.899 SINGLE UMBILICAL ARTERY AFFECTING MANAGEMENT OF MOTHER IN SINGLETON PREGNANCY, ANTEPARTUM: ICD-10-CM

## 2021-12-02 DIAGNOSIS — O09.293 PRIOR PREGNANCY WITH PLACENTA ABRUPTION IN THIRD TRIMESTER, ANTEPARTUM: ICD-10-CM

## 2021-12-02 DIAGNOSIS — Z3A.35 35 WEEKS GESTATION OF PREGNANCY: Primary | ICD-10-CM

## 2021-12-02 PROCEDURE — 59025 FETAL NON-STRESS TEST: CPT | Performed by: OBSTETRICS & GYNECOLOGY

## 2021-12-02 PROCEDURE — 76815 OB US LIMITED FETUS(S): CPT | Performed by: OBSTETRICS & GYNECOLOGY

## 2021-12-09 ENCOUNTER — ULTRASOUND (OUTPATIENT)
Dept: PERINATAL CARE | Facility: OTHER | Age: 31
End: 2021-12-09
Payer: COMMERCIAL

## 2021-12-09 VITALS
BODY MASS INDEX: 41.95 KG/M2 | DIASTOLIC BLOOD PRESSURE: 76 MMHG | WEIGHT: 293 LBS | HEART RATE: 88 BPM | HEIGHT: 70 IN | SYSTOLIC BLOOD PRESSURE: 117 MMHG

## 2021-12-09 DIAGNOSIS — O09.899 SINGLE UMBILICAL ARTERY AFFECTING MANAGEMENT OF MOTHER IN SINGLETON PREGNANCY, ANTEPARTUM: Primary | ICD-10-CM

## 2021-12-09 DIAGNOSIS — Z3A.36 36 WEEKS GESTATION OF PREGNANCY: ICD-10-CM

## 2021-12-09 DIAGNOSIS — O99.213 MATERNAL OBESITY SYNDROME IN THIRD TRIMESTER: ICD-10-CM

## 2021-12-09 PROCEDURE — 59025 FETAL NON-STRESS TEST: CPT | Performed by: OBSTETRICS & GYNECOLOGY

## 2021-12-09 PROCEDURE — 76815 OB US LIMITED FETUS(S): CPT | Performed by: OBSTETRICS & GYNECOLOGY

## 2021-12-10 ENCOUNTER — ROUTINE PRENATAL (OUTPATIENT)
Dept: OBGYN CLINIC | Facility: MEDICAL CENTER | Age: 31
End: 2021-12-10

## 2021-12-10 VITALS — BODY MASS INDEX: 43.76 KG/M2 | DIASTOLIC BLOOD PRESSURE: 80 MMHG | SYSTOLIC BLOOD PRESSURE: 110 MMHG | WEIGHT: 293 LBS

## 2021-12-10 DIAGNOSIS — Z34.93 THIRD TRIMESTER PREGNANCY: ICD-10-CM

## 2021-12-10 DIAGNOSIS — Z3A.36 36 WEEKS GESTATION OF PREGNANCY: Primary | ICD-10-CM

## 2021-12-10 PROCEDURE — PNV: Performed by: OBSTETRICS & GYNECOLOGY

## 2021-12-10 PROCEDURE — 87150 DNA/RNA AMPLIFIED PROBE: CPT | Performed by: OBSTETRICS & GYNECOLOGY

## 2021-12-12 PROBLEM — Z3A.37 37 WEEKS GESTATION OF PREGNANCY: Status: ACTIVE | Noted: 2021-07-22

## 2021-12-12 LAB — GP B STREP DNA SPEC QL NAA+PROBE: POSITIVE

## 2021-12-13 ENCOUNTER — ROUTINE PRENATAL (OUTPATIENT)
Dept: OBGYN CLINIC | Facility: MEDICAL CENTER | Age: 31
End: 2021-12-13

## 2021-12-13 VITALS — DIASTOLIC BLOOD PRESSURE: 64 MMHG | WEIGHT: 293 LBS | BODY MASS INDEX: 43.91 KG/M2 | SYSTOLIC BLOOD PRESSURE: 124 MMHG

## 2021-12-13 DIAGNOSIS — Z3A.37 37 WEEKS GESTATION OF PREGNANCY: Primary | ICD-10-CM

## 2021-12-13 DIAGNOSIS — O09.899 SINGLE UMBILICAL ARTERY AFFECTING MANAGEMENT OF MOTHER IN SINGLETON PREGNANCY, ANTEPARTUM: ICD-10-CM

## 2021-12-13 DIAGNOSIS — F33.0 MILD EPISODE OF RECURRENT MAJOR DEPRESSIVE DISORDER (HCC): ICD-10-CM

## 2021-12-13 DIAGNOSIS — O36.63X0 MACROSOMIA OF FETUS AFFECTING MANAGEMENT OF MOTHER IN THIRD TRIMESTER, SINGLE OR UNSPECIFIED FETUS: ICD-10-CM

## 2021-12-13 DIAGNOSIS — O99.213 MATERNAL OBESITY SYNDROME IN THIRD TRIMESTER: ICD-10-CM

## 2021-12-13 DIAGNOSIS — O09.293 PRIOR PREGNANCY WITH PLACENTA ABRUPTION IN THIRD TRIMESTER, ANTEPARTUM: ICD-10-CM

## 2021-12-13 PROCEDURE — PNV: Performed by: OBSTETRICS & GYNECOLOGY

## 2021-12-16 ENCOUNTER — APPOINTMENT (OUTPATIENT)
Dept: PERINATAL CARE | Facility: OTHER | Age: 31
End: 2021-12-16
Payer: COMMERCIAL

## 2021-12-16 ENCOUNTER — ULTRASOUND (OUTPATIENT)
Dept: PERINATAL CARE | Facility: OTHER | Age: 31
End: 2021-12-16
Payer: COMMERCIAL

## 2021-12-16 VITALS
WEIGHT: 293 LBS | BODY MASS INDEX: 41.95 KG/M2 | DIASTOLIC BLOOD PRESSURE: 84 MMHG | SYSTOLIC BLOOD PRESSURE: 123 MMHG | HEIGHT: 70 IN | HEART RATE: 96 BPM

## 2021-12-16 DIAGNOSIS — O99.213 MATERNAL OBESITY SYNDROME IN THIRD TRIMESTER: ICD-10-CM

## 2021-12-16 DIAGNOSIS — O09.899 SINGLE UMBILICAL ARTERY AFFECTING MANAGEMENT OF MOTHER IN SINGLETON PREGNANCY, ANTEPARTUM: ICD-10-CM

## 2021-12-16 DIAGNOSIS — Z3A.38 38 WEEKS GESTATION OF PREGNANCY: Primary | ICD-10-CM

## 2021-12-16 PROCEDURE — 59025 FETAL NON-STRESS TEST: CPT | Performed by: OBSTETRICS & GYNECOLOGY

## 2021-12-16 PROCEDURE — 76816 OB US FOLLOW-UP PER FETUS: CPT | Performed by: OBSTETRICS & GYNECOLOGY

## 2021-12-16 PROCEDURE — 1036F TOBACCO NON-USER: CPT | Performed by: OBSTETRICS & GYNECOLOGY

## 2021-12-16 PROCEDURE — 99213 OFFICE O/P EST LOW 20 MIN: CPT | Performed by: OBSTETRICS & GYNECOLOGY

## 2021-12-17 ENCOUNTER — TELEPHONE (OUTPATIENT)
Dept: OBGYN CLINIC | Facility: MEDICAL CENTER | Age: 31
End: 2021-12-17

## 2021-12-17 NOTE — TELEPHONE ENCOUNTER
Pt called about scheduling her induction for Monday or Tuesday and wanted an update  Pt said she spoke to your this morning  If you can call her when you get a chance

## 2021-12-20 ENCOUNTER — ROUTINE PRENATAL (OUTPATIENT)
Dept: PERINATAL CARE | Facility: OTHER | Age: 31
End: 2021-12-20
Payer: COMMERCIAL

## 2021-12-20 VITALS
SYSTOLIC BLOOD PRESSURE: 125 MMHG | WEIGHT: 293 LBS | BODY MASS INDEX: 41.95 KG/M2 | HEIGHT: 70 IN | DIASTOLIC BLOOD PRESSURE: 81 MMHG | HEART RATE: 94 BPM

## 2021-12-20 DIAGNOSIS — O09.899 SINGLE UMBILICAL ARTERY AFFECTING MANAGEMENT OF MOTHER IN SINGLETON PREGNANCY, ANTEPARTUM: ICD-10-CM

## 2021-12-20 DIAGNOSIS — Z3A.38 38 WEEKS GESTATION OF PREGNANCY: Primary | ICD-10-CM

## 2021-12-20 DIAGNOSIS — O99.213 MATERNAL OBESITY SYNDROME IN THIRD TRIMESTER: ICD-10-CM

## 2021-12-20 PROCEDURE — 59025 FETAL NON-STRESS TEST: CPT | Performed by: OBSTETRICS & GYNECOLOGY

## 2021-12-20 PROCEDURE — 3008F BODY MASS INDEX DOCD: CPT | Performed by: OBSTETRICS & GYNECOLOGY

## 2021-12-22 ENCOUNTER — ROUTINE PRENATAL (OUTPATIENT)
Dept: OBGYN CLINIC | Facility: MEDICAL CENTER | Age: 31
End: 2021-12-22

## 2021-12-22 ENCOUNTER — TELEPHONE (OUTPATIENT)
Dept: OBGYN CLINIC | Facility: MEDICAL CENTER | Age: 31
End: 2021-12-22

## 2021-12-22 VITALS — BODY MASS INDEX: 43.91 KG/M2 | DIASTOLIC BLOOD PRESSURE: 80 MMHG | WEIGHT: 293 LBS | SYSTOLIC BLOOD PRESSURE: 124 MMHG

## 2021-12-22 DIAGNOSIS — Z3A.39 39 WEEKS GESTATION OF PREGNANCY: Primary | ICD-10-CM

## 2021-12-22 DIAGNOSIS — F33.0 MILD EPISODE OF RECURRENT MAJOR DEPRESSIVE DISORDER (HCC): ICD-10-CM

## 2021-12-22 DIAGNOSIS — O09.899 SINGLE UMBILICAL ARTERY AFFECTING MANAGEMENT OF MOTHER IN SINGLETON PREGNANCY, ANTEPARTUM: ICD-10-CM

## 2021-12-22 DIAGNOSIS — O99.213 MATERNAL OBESITY SYNDROME IN THIRD TRIMESTER: ICD-10-CM

## 2021-12-22 PROCEDURE — PNV: Performed by: OBSTETRICS & GYNECOLOGY

## 2021-12-27 ENCOUNTER — TELEPHONE (OUTPATIENT)
Dept: OBGYN CLINIC | Facility: MEDICAL CENTER | Age: 31
End: 2021-12-27

## 2021-12-27 ENCOUNTER — HOSPITAL ENCOUNTER (INPATIENT)
Facility: HOSPITAL | Age: 31
LOS: 4 days | Discharge: HOME | End: 2021-12-31
Attending: OBSTETRICS & GYNECOLOGY | Admitting: OBSTETRICS & GYNECOLOGY
Payer: COMMERCIAL

## 2021-12-27 DIAGNOSIS — Z98.891 STATUS POST PRIMARY LOW TRANSVERSE CESAREAN SECTION: Primary | ICD-10-CM

## 2021-12-27 DIAGNOSIS — O99.213 MATERNAL OBESITY SYNDROME IN THIRD TRIMESTER: ICD-10-CM

## 2021-12-27 LAB
ABO GROUP BLD: NORMAL
AMPHETAMINES SERPL QL SCN: NEGATIVE
BARBITURATES UR QL: NEGATIVE
BENZODIAZ UR QL: NEGATIVE
BLD GP AB SCN SERPL QL: NEGATIVE
COCAINE UR QL: NEGATIVE
ERYTHROCYTE [DISTWIDTH] IN BLOOD BY AUTOMATED COUNT: 13.9 % (ref 11.6–15.1)
HCT VFR BLD AUTO: 33 % (ref 34.8–46.1)
HGB BLD-MCNC: 11 G/DL (ref 11.5–15.4)
MCH RBC QN AUTO: 30.1 PG (ref 26.8–34.3)
MCHC RBC AUTO-ENTMCNC: 33.3 G/DL (ref 31.4–37.4)
MCV RBC AUTO: 90 FL (ref 82–98)
METHADONE UR QL: NEGATIVE
OPIATES UR QL SCN: NEGATIVE
OXYCODONE+OXYMORPHONE UR QL SCN: NEGATIVE
PCP UR QL: NEGATIVE
PLATELET # BLD AUTO: 206 THOUSANDS/UL (ref 149–390)
PMV BLD AUTO: 10.5 FL (ref 8.9–12.7)
RBC # BLD AUTO: 3.66 MILLION/UL (ref 3.81–5.12)
RH BLD: POSITIVE
SPECIMEN EXPIRATION DATE: NORMAL
THC UR QL: NEGATIVE
WBC # BLD AUTO: 10.67 THOUSAND/UL (ref 4.31–10.16)

## 2021-12-27 PROCEDURE — 86592 SYPHILIS TEST NON-TREP QUAL: CPT | Performed by: OBSTETRICS & GYNECOLOGY

## 2021-12-27 PROCEDURE — 85027 COMPLETE CBC AUTOMATED: CPT | Performed by: OBSTETRICS & GYNECOLOGY

## 2021-12-27 PROCEDURE — 86900 BLOOD TYPING SEROLOGIC ABO: CPT | Performed by: OBSTETRICS & GYNECOLOGY

## 2021-12-27 PROCEDURE — 4A1HXCZ MONITORING OF PRODUCTS OF CONCEPTION, CARDIAC RATE, EXTERNAL APPROACH: ICD-10-PCS | Performed by: STUDENT IN AN ORGANIZED HEALTH CARE EDUCATION/TRAINING PROGRAM

## 2021-12-27 PROCEDURE — 99213 OFFICE O/P EST LOW 20 MIN: CPT

## 2021-12-27 PROCEDURE — 3E0DXGC INTRODUCTION OF OTHER THERAPEUTIC SUBSTANCE INTO MOUTH AND PHARYNX, EXTERNAL APPROACH: ICD-10-PCS | Performed by: STUDENT IN AN ORGANIZED HEALTH CARE EDUCATION/TRAINING PROGRAM

## 2021-12-27 PROCEDURE — 86901 BLOOD TYPING SEROLOGIC RH(D): CPT | Performed by: OBSTETRICS & GYNECOLOGY

## 2021-12-27 PROCEDURE — 86850 RBC ANTIBODY SCREEN: CPT | Performed by: OBSTETRICS & GYNECOLOGY

## 2021-12-27 PROCEDURE — NC001 PR NO CHARGE: Performed by: OBSTETRICS & GYNECOLOGY

## 2021-12-27 PROCEDURE — 80307 DRUG TEST PRSMV CHEM ANLYZR: CPT | Performed by: OBSTETRICS & GYNECOLOGY

## 2021-12-27 RX ORDER — ONDANSETRON 2 MG/ML
4 INJECTION INTRAMUSCULAR; INTRAVENOUS EVERY 6 HOURS PRN
Status: DISCONTINUED | OUTPATIENT
Start: 2021-12-27 | End: 2021-12-29

## 2021-12-27 RX ORDER — SODIUM CHLORIDE, SODIUM LACTATE, POTASSIUM CHLORIDE, CALCIUM CHLORIDE 600; 310; 30; 20 MG/100ML; MG/100ML; MG/100ML; MG/100ML
125 INJECTION, SOLUTION INTRAVENOUS CONTINUOUS
Status: DISCONTINUED | OUTPATIENT
Start: 2021-12-27 | End: 2021-12-29

## 2021-12-27 RX ORDER — LORATADINE 10 MG/1
10 TABLET ORAL DAILY
Status: DISCONTINUED | OUTPATIENT
Start: 2021-12-28 | End: 2021-12-31 | Stop reason: HOSPADM

## 2021-12-27 RX ADMIN — SODIUM CHLORIDE 5 MILLION UNITS: 0.9 INJECTION, SOLUTION INTRAVENOUS at 18:31

## 2021-12-27 RX ADMIN — SODIUM CHLORIDE 2.5 MILLION UNITS: 9 INJECTION, SOLUTION INTRAVENOUS at 22:36

## 2021-12-27 RX ADMIN — SODIUM CHLORIDE, SODIUM LACTATE, POTASSIUM CHLORIDE, AND CALCIUM CHLORIDE 125 ML/HR: .6; .31; .03; .02 INJECTION, SOLUTION INTRAVENOUS at 18:31

## 2021-12-27 RX ADMIN — Medication 25 MCG: at 18:31

## 2021-12-27 NOTE — H&P
H&P Exam - Obstetrics   Julian Bal 28 y o  female MRN: [id number]  Unit/Bed#: L&D 322-01 Encounter: [id number]      History of Present Illness     Chief Complaint: Induction of labor    HPI:  Mariza Fleming is a 28 y o   female with an LEONARDA of 2021, by Last Menstrual Period at 39w6d weeks gestation who is being admitted for induction of labor for oligohydramnios    Contractions: No  Loss of fluid: No  Vaginal bleeding: No  Fetal movement: Present    She is an [facility] patient  PREGNANCY COMPLICATIONS:   1) Obesity in pregnancy, BMI 44  2) 51lbs weight gain in pregnancy  3) History of abruption in prior pregnancy  4) Anxiety, on sertraline  5) History of THC use in past 3 years  6) GBS carrier status    OB History    Para Term  AB Living   3 0 0 0 2 0   SAB IAB Ectopic Multiple Live Births   1 0 0 0 0      # Outcome Date GA Lbr Geovany/2nd Weight Sex Delivery Anes PTL Lv   3 Current            2 SAB      SAB         Birth Comments: early first trimester SAB   1 AB 19 15w3d       FD      Birth Comments: D&E      Complications: Abruptio Placenta,  premature rupture of membranes (PPROM) with unknown onset of labor       Baby complications/comments: Single umbilical artery  Previously concern for macrosomia    21:   MEASUREMENTS (* Included In Average GA)     AC              34 1 cm        38 weeks 0 days* (62%)  BPD              9 6 cm        39 weeks 0 days* (89%)  HC              34 9 cm        40 weeks 4 days* (83%)  Femur            7 2 cm        37 weeks 0 days* (23%)     HC/AC           1 02 [0 92 - 1 05]                 (64%)  FL/AC             21 [20 - 24]  FL/BPD            76 [71 - 87]  EFW Hadlock 4   3427 grams - 7 lbs 9 oz                 (61%)     THE AVERAGE GESTATIONAL AGE is 38 weeks 5 days +/- 21 days        Review of Systems   Constitutional: Negative for fever  HENT: Negative for rhinorrhea, sinus pressure, sneezing and sore throat      Eyes: Negative for visual disturbance  Respiratory: Negative for cough, shortness of breath and wheezing  Cardiovascular: Negative for chest pain, palpitations and leg swelling  Gastrointestinal: Negative for abdominal distention, abdominal pain, blood in stool, nausea and vomiting  Genitourinary: Negative for dysuria, flank pain, vaginal bleeding and vaginal discharge  Musculoskeletal: Negative for neck pain and neck stiffness  Skin: Negative for color change, pallor and rash  Neurological: Negative for light-headedness, numbness and headaches           Historical Information   Past Medical History:   Diagnosis Date    Alcohol abuse     in past    Anxiety     Arthritis     bilateral hips    Cyst of breast, left     Depression     Female infertility     Lump of left breast     LAST ASSESSED: 17    PCOS (polycystic ovarian syndrome)     Polycystic ovary syndrome     Substance abuse ([facility] [address] )     Marijuana use in past    Varicella      Past Surgical History:   Procedure Laterality Date    [place] INDUCED ABORTN BY DIL/EVAC N/A 2019    Procedure: DILATATION AND EVACUATION (D&E) 15 Weeks;  Surgeon: Jessica Denson MD;  [facility]: [facility];  Service: Gynecology    TONSILLECTOMY       Social History   Social History     Substance and Sexual Activity   Alcohol Use Not Currently    Comment: sober 7 years     Social History     Substance and Sexual Activity   Drug Use Not Currently    Types: Marijuana    Comment: quit with pregnancy/ was using once a day prior     Social History     Tobacco Use   Smoking Status Former Smoker    Quit date: 2019    Years since quittin 4   Smokeless Tobacco Never Used   Tobacco Comment    Quit     Family History: non-contributory    Meds/Allergies      Medications Prior to Admission   Medication    cholecalciferol (VITAMIN D3) 400 units tablet    Doxylamine Succinate, Sleep, (UNISOM PO)    loratadine (CLARITIN) 10 mg tablet    Magnesium 250 MG TABS    Prenatal MV & Min w/FA-DHA (PRENATAL ADULT GUMMY/DHA/FA PO)    sertraline (ZOLOFT) 25 mg tablet      No Known Allergies    OBJECTIVE:    Vitals: Blood pressure 137/82, pulse 96, temperature 98 1 °F (36 7 °C), temperature source Axillary, height 5' 10" (1 778 m), weight (!) 139 kg (306 lb), last menstrual period 03/23/2021, not currently breastfeeding  Body mass index is 43 91 kg/m²  Physical Exam  Exam conducted with a chaperone present  Constitutional:       General: She is not in acute distress  Appearance: She is well-developed  She is not diaphoretic  HENT:      Head: Normocephalic and atraumatic  Eyes:      Pupils: Pupils are equal, round, and reactive to light  Cardiovascular:      Rate and Rhythm: Normal rate  Pulses: Normal pulses  Pulmonary:      Effort: Pulmonary effort is normal  No respiratory distress  Abdominal:      General: There is no distension  Palpations: Abdomen is soft  There is no mass  Tenderness: There is no abdominal tenderness  There is no guarding  Comments: gravid   Musculoskeletal:         General: No deformity  Normal range of motion  Cervical back: Normal range of motion  Skin:     General: Skin is warm and dry  Neurological:      General: No focal deficit present  Mental Status: She is alert  Mental status is at baseline     Psychiatric:         Mood and Affect: Mood normal          Behavior: Behavior normal            TAUS: Vertex  LVP 2 2cm    Cervix:   deferred, previously 1cm, will check at time of mahan/cytotec placement    Fetal heart rate:   Baseline Rate: 135 bpm  Variability: Moderate 6-25 bpm  Accelerations: 15 x 15 or greater  Decelerations: None  FHR Category: Category I    [place]:   Contraction Frequency (minutes): x1  Contraction Quality: Mild    EFW: 8lbs    GBS: Positive    Prenatal Labs:   Blood Type:   Lab Results   Component Value Date/Time    ABO Grouping O 06/23/2021 12:49 PM    ABO Grouping O 09/18/2019 07:02 AM , D (Rh type):   Lab Results   Component Value Date/Time    Rh Type Positive 2021 12:49 PM    , HCT/HGB:   Lab Results   Component Value Date/Time    Hematocrit 34 9 10/11/2021 02:06 PM    Hemoglobin 11 3 (L) 10/11/2021 02:06 PM      , MCV:   Lab Results   Component Value Date/Time    MCV 94 10/11/2021 02:06 PM      , Platelets:   Lab Results   Component Value Date/Time    Platelets 659  02:06 PM      , 1 hour Glucola:   Lab Results   Component Value Date/Time    Glucose 87 10/11/2021 02:06 PM   , Rubella: immune 21      , VDRL/RPR:   Lab Results   Component Value Date/Time    RPR Non Reactive 2021 12:49 PM      , Urine Culture/Screen:   Lab Results   Component Value Date/Time    Urine Culture No Growth <1000 cfu/mL 2019 09:32 AM      , Hep B: HepB sAg negative 21  , HIV: Non reactive 21   , Chlamydia: Negative 21  , Gonorrhea:   Lab Results   Component Value Date/Time    N gonorrhoeae, DNA Probe Negative 2021 05:04 PM     , Group B Strep:    Lab Results   Component Value Date/Time    Strep Grp B PCR Positive (A) 12/10/2021 12:20 PM          Invasive Devices  Report    None                   Assessment/Plan     ASSESSMENT:  30yo  at 39w6d weeks gestation who is being admitted for induction of labor  PLAN:   1) Admit   2) CBC, RPR, Blood Type   3) Start with cytotec and mahan balloon   4) GBS positive status: start PCN for prophylaxis    5) Analgesia and/or epidural at patient request   6) Anticipate    7) Discussed with Dr Kelly Bey    Reviewed risks and benefits of induction with patient, including but not limited to  section for malpresentation, arrest disorders, or fetal intolerance, and operative delivery for fetal intolerance or maternal exhaustion  Patient aware of risks and benefits and elects to proceed with induction  This patient will be an INPATIENT  and I certify the anticipated length of stay is >2 Midnights      [facility] MD Dsemond  12/27/2021  3:21 PM

## 2021-12-27 NOTE — PLAN OF CARE
Problem: Knowledge Deficit  Goal: Verbalizes understanding of labor plan  Description: Assess patient/family/caregiver's baseline knowledge level and ability to understand information  Provide education via patient/family/caregiver's preferred learning method at appropriate level of understanding  1  Provide teaching at level of understanding  2  Provide teaching via preferred learning method(s)  Outcome: Progressing     Problem: Labor & Delivery  Goal: Manages discomfort  Description: Assess and monitor for signs and symptoms of discomfort  Assess patient's pain level regularly and per hospital policy  Administer medications as ordered  Support use of nonpharmacological methods to help control pain such as distraction, imagery, relaxation, and application of heat and cold  Collaborate with interdisciplinary team and patient to determine appropriate pain management plan  1  Include patient in decisions related to comfort  2  Offer non-pharmacological pain management interventions  3  Report ineffective pain management to physician  Outcome: Progressing  Goal: Patient vital signs are stable  Description: 1  Assess vital signs - vaginal delivery    Outcome: Progressing     Problem: BIRTH - VAGINAL/ SECTION  Goal: Fetal and maternal status remain reassuring during the birth process  Description: INTERVENTIONS:  - Monitor vital signs  - Monitor fetal heart rate  - Monitor uterine activity  - Monitor labor progression (vaginal delivery)  - DVT prophylaxis  - Antibiotic prophylaxis  Outcome: Progressing  Goal: Emotionally satisfying birthing experience for mother/fetus  Description: Interventions:  - Assess, plan, implement and evaluate the nursing care given to the patient in labor  - Advocate the philosophy that each childbirth experience is a unique experience and support the family's chosen level of involvement and control during the labor process   - Actively participate in both the patient's and family's teaching of the birth process  - Consider cultural, Gnosticist and age-specific factors and plan care for the patient in labor  Outcome: Progressing

## 2021-12-27 NOTE — OB LABOR/OXYTOCIN SAFETY PROGRESS
Labor Progress Note - Robyn Bal 28 y o  female MRN: [id number]    Unit/Bed#: L&D 322-01 Encounter: [id number]       Contraction Frequency (minutes): occassional  Contraction Quality: Mild  Tachysystole: No   Cervical Dilation: Fingertip        Cervical Effacement: 0  Fetal Station: -3  Baseline Rate: 130 bpm  Fetal Heart Rate: 165 BPM  FHR Category: Category I               Vital Signs:   Vitals:    12/27/21 2351   BP: 125/68   Pulse: 86   Resp:    Temp: 98 2 °F (36 8 °C)           Notes/comments: first dose of vaginal cytotec placed  Will attempt mahan balloon placement with next check  Will discuss with Dr Blanche Farr MD 12/27/2021 11:52 PM

## 2021-12-28 ENCOUNTER — ANESTHESIA EVENT (INPATIENT)
Dept: LABOR AND DELIVERY | Facility: HOSPITAL | Age: 31
End: 2021-12-28
Payer: COMMERCIAL

## 2021-12-28 ENCOUNTER — ANESTHESIA (INPATIENT)
Dept: LABOR AND DELIVERY | Facility: HOSPITAL | Age: 31
End: 2021-12-28
Payer: COMMERCIAL

## 2021-12-28 LAB — RPR SER QL: NORMAL

## 2021-12-28 PROCEDURE — 10907ZC DRAINAGE OF AMNIOTIC FLUID, THERAPEUTIC FROM PRODUCTS OF CONCEPTION, VIA NATURAL OR ARTIFICIAL OPENING: ICD-10-PCS | Performed by: STUDENT IN AN ORGANIZED HEALTH CARE EDUCATION/TRAINING PROGRAM

## 2021-12-28 PROCEDURE — 10H07YZ INSERTION OF OTHER DEVICE INTO PRODUCTS OF CONCEPTION, VIA NATURAL OR ARTIFICIAL OPENING: ICD-10-PCS | Performed by: STUDENT IN AN ORGANIZED HEALTH CARE EDUCATION/TRAINING PROGRAM

## 2021-12-28 RX ORDER — LIDOCAINE HYDROCHLORIDE AND EPINEPHRINE 15; 5 MG/ML; UG/ML
INJECTION, SOLUTION EPIDURAL
Status: COMPLETED | OUTPATIENT
Start: 2021-12-28 | End: 2021-12-28

## 2021-12-28 RX ORDER — OXYTOCIN/RINGER'S LACTATE 30/500 ML
1-30 PLASTIC BAG, INJECTION (ML) INTRAVENOUS
Status: DISCONTINUED | OUTPATIENT
Start: 2021-12-28 | End: 2021-12-29

## 2021-12-28 RX ORDER — ROPIVACAINE HYDROCHLORIDE 2 MG/ML
INJECTION, SOLUTION EPIDURAL; INFILTRATION; PERINEURAL
Status: COMPLETED
Start: 2021-12-28 | End: 2021-12-28

## 2021-12-28 RX ORDER — ROPIVACAINE HYDROCHLORIDE 2 MG/ML
INJECTION, SOLUTION EPIDURAL; INFILTRATION; PERINEURAL CONTINUOUS PRN
Status: DISCONTINUED | OUTPATIENT
Start: 2021-12-28 | End: 2021-12-29

## 2021-12-28 RX ORDER — ROPIVACAINE HYDROCHLORIDE 2 MG/ML
INJECTION, SOLUTION EPIDURAL; INFILTRATION; PERINEURAL AS NEEDED
Status: DISCONTINUED | OUTPATIENT
Start: 2021-12-28 | End: 2021-12-29

## 2021-12-28 RX ADMIN — ROPIVACAINE HYDROCHLORIDE 10 ML/HR: 2 INJECTION, SOLUTION EPIDURAL; INFILTRATION; PERINEURAL at 14:52

## 2021-12-28 RX ADMIN — ROPIVACAINE HYDROCHLORIDE: 2 INJECTION, SOLUTION EPIDURAL; INFILTRATION at 23:20

## 2021-12-28 RX ADMIN — ROPIVACAINE HYDROCHLORIDE: 2 INJECTION, SOLUTION EPIDURAL; INFILTRATION at 15:30

## 2021-12-28 RX ADMIN — SODIUM CHLORIDE 2.5 MILLION UNITS: 9 INJECTION, SOLUTION INTRAVENOUS at 09:05

## 2021-12-28 RX ADMIN — SODIUM CHLORIDE, SODIUM LACTATE, POTASSIUM CHLORIDE, AND CALCIUM CHLORIDE 125 ML/HR: .6; .31; .03; .02 INJECTION, SOLUTION INTRAVENOUS at 04:35

## 2021-12-28 RX ADMIN — SODIUM CHLORIDE, SODIUM LACTATE, POTASSIUM CHLORIDE, AND CALCIUM CHLORIDE 125 ML/HR: .6; .31; .03; .02 INJECTION, SOLUTION INTRAVENOUS at 15:07

## 2021-12-28 RX ADMIN — SODIUM CHLORIDE 2.5 MILLION UNITS: 9 INJECTION, SOLUTION INTRAVENOUS at 17:36

## 2021-12-28 RX ADMIN — ROPIVACAINE HYDROCHLORIDE 5 ML: 2 INJECTION, SOLUTION EPIDURAL; INFILTRATION at 14:50

## 2021-12-28 RX ADMIN — SERTRALINE HYDROCHLORIDE 25 MG: 50 TABLET ORAL at 00:06

## 2021-12-28 RX ADMIN — Medication 50 MCG: at 00:02

## 2021-12-28 RX ADMIN — SODIUM CHLORIDE 2.5 MILLION UNITS: 9 INJECTION, SOLUTION INTRAVENOUS at 21:30

## 2021-12-28 RX ADMIN — SODIUM CHLORIDE 2.5 MILLION UNITS: 9 INJECTION, SOLUTION INTRAVENOUS at 04:35

## 2021-12-28 RX ADMIN — LORATADINE 10 MG: 10 TABLET ORAL at 21:29

## 2021-12-28 RX ADMIN — Medication 2 MILLI-UNITS/MIN: at 10:25

## 2021-12-28 RX ADMIN — LIDOCAINE HYDROCHLORIDE AND EPINEPHRINE 3 ML: 15; 5 INJECTION, SOLUTION EPIDURAL at 14:47

## 2021-12-28 RX ADMIN — LORATADINE 10 MG: 10 TABLET ORAL at 00:06

## 2021-12-28 RX ADMIN — SODIUM CHLORIDE, SODIUM LACTATE, POTASSIUM CHLORIDE, AND CALCIUM CHLORIDE 125 ML/HR: .6; .31; .03; .02 INJECTION, SOLUTION INTRAVENOUS at 13:47

## 2021-12-28 RX ADMIN — SODIUM CHLORIDE 2.5 MILLION UNITS: 9 INJECTION, SOLUTION INTRAVENOUS at 13:47

## 2021-12-28 RX ADMIN — SODIUM CHLORIDE, SODIUM LACTATE, POTASSIUM CHLORIDE, AND CALCIUM CHLORIDE 125 ML/HR: .6; .31; .03; .02 INJECTION, SOLUTION INTRAVENOUS at 23:19

## 2021-12-28 RX ADMIN — Medication 50 MCG: at 06:15

## 2021-12-28 RX ADMIN — SERTRALINE HYDROCHLORIDE 25 MG: 50 TABLET ORAL at 21:29

## 2021-12-28 NOTE — OB LABOR/OXYTOCIN SAFETY PROGRESS
Labor Progress Note - Belinda Bal 28 y o  female MRN: [id number]    Unit/Bed#: L&D 322-01 Encounter: [id number]       Contraction Frequency (minutes): 1-4 5  Contraction Quality: Mild  Tachysystole: No   Cervical Dilation: 4        Cervical Effacement: 50  Fetal Station: -3  Baseline Rate: 130 bpm  Fetal Heart Rate: 165 BPM  FHR Category: Category I               Vital Signs:   Vitals:    12/28/21 0800   BP:    Pulse:    Resp: 16   Temp:            Notes/comments:   Came to evaluate pt  Mcmahon balloon was tugged and expelled   Pt getting more uncomfortable and likely desires epidural soon  FHT with 3 minute deceleration which resolved spontaneously, now category I  Plan to start pitocin when > 4 hours since last misoprostol dose        Lisa Mills MD 12/28/2021 9:06 AM

## 2021-12-28 NOTE — PLAN OF CARE
Problem: Knowledge Deficit  Goal: Verbalizes understanding of labor plan  Description: Assess patient/family/caregiver's baseline knowledge level and ability to understand information  Provide education via patient/family/caregiver's preferred learning method at appropriate level of understanding  1  Provide teaching at level of understanding  2  Provide teaching via preferred learning method(s)  Outcome: Progressing  Goal: Patient/family/caregiver demonstrates understanding of disease process, treatment plan, medications, and discharge instructions  Description: Complete learning assessment and assess knowledge base  Interventions:  - Provide teaching at level of understanding  - Provide teaching via preferred learning methods  Outcome: Progressing     Problem: Labor & Delivery  Goal: Manages discomfort  Description: Assess and monitor for signs and symptoms of discomfort  Assess patient's pain level regularly and per hospital policy  Administer medications as ordered  Support use of nonpharmacological methods to help control pain such as distraction, imagery, relaxation, and application of heat and cold  Collaborate with interdisciplinary team and patient to determine appropriate pain management plan  1  Include patient in decisions related to comfort  2  Offer non-pharmacological pain management interventions  3  Report ineffective pain management to physician  Outcome: Progressing  Goal: Patient vital signs are stable  Description: 1  Assess vital signs - vaginal delivery    Outcome: Progressing     Problem: BIRTH - VAGINAL/ SECTION  Goal: Fetal and maternal status remain reassuring during the birth process  Description: INTERVENTIONS:  - Monitor vital signs  - Monitor fetal heart rate  - Monitor uterine activity  - Monitor labor progression (vaginal delivery)  - DVT prophylaxis  - Antibiotic prophylaxis  Outcome: Progressing  Goal: Emotionally satisfying birthing experience for mother/fetus  Description: Interventions:  - Assess, plan, implement and evaluate the nursing care given to the patient in labor  - Advocate the philosophy that each childbirth experience is a unique experience and support the family's chosen level of involvement and control during the labor process   - Actively participate in both the patient's and family's teaching of the birth process  - Consider cultural, Buddhist and age-specific factors and plan care for the patient in labor  Outcome: Progressing     Problem: PAIN - ADULT  Goal: Verbalizes/displays adequate comfort level or baseline comfort level  Description: Interventions:  - Encourage patient to monitor pain and request assistance  - Assess pain using appropriate pain scale  - Administer analgesics based on type and severity of pain and evaluate response  - Implement non-pharmacological measures as appropriate and evaluate response  - Consider cultural and social influences on pain and pain management  - Notify physician/advanced practitioner if interventions unsuccessful or patient reports new pain  Outcome: Progressing     Problem: DISCHARGE PLANNING  Goal: Discharge to home or other facility with appropriate resources  Description: INTERVENTIONS:  - Identify barriers to discharge w/patient and caregiver  - Arrange for needed discharge resources and transportation as appropriate  - Identify discharge learning needs (meds, wound care, etc )  - Arrange for interpretive services to assist at discharge as needed  - Refer to Case Management Department for coordinating discharge planning if the patient needs post-hospital services based on physician/advanced practitioner order or complex needs related to functional status, cognitive ability, or social support system  Outcome: Progressing

## 2021-12-28 NOTE — OB LABOR/OXYTOCIN SAFETY PROGRESS
Labor Progress Note - Shannan MachucaazioBanes 28 y o  female MRN: [id number]    Unit/Bed#: L&D 322-01 Encounter: [id number]       Contraction Frequency (minutes): 1 5-4 5  Contraction Quality: Mild  Tachysystole: No   Cervical Dilation: 1        Cervical Effacement: 50  Fetal Station: -3  Baseline Rate: 130 bpm  Fetal Heart Rate: 165 BPM  FHR Category: Category I               Vital Signs:   Vitals:    12/27/21 2358   BP: 120/67   Pulse: 78   Resp:    Temp:            Notes/comments: mahan placed blindly without incident  Patient to receive third dose of cytotec orally  Tension applied to mahan  Continue to monitor  To be discussed with DO Chelsie De Los Santos MD 12/28/2021 4:29 AM

## 2021-12-28 NOTE — OB LABOR/OXYTOCIN SAFETY PROGRESS
Labor Progress Note - Ever Bal 28 y o  female MRN: [id number]    Unit/Bed#: L&D 322-01 Encounter: [id number]       Contraction Frequency (minutes): occassional  Contraction Quality: Mild  Tachysystole: No   Cervical Dilation: Fingertip        Cervical Effacement: 0  Fetal Station: -3  Baseline Rate: 130 bpm  Fetal Heart Rate: 165 BPM  FHR Category: Category I               Vital Signs:   Vitals:    12/27/21 2351   BP: 125/68   Pulse: 86   Resp:    Temp: 98 2 °F (36 8 °C)           Notes/comments: no cervical change noted  Will give second dose of cytotec  Fetal heart tracing category II  To be discussed with Dr Angelica Kelly MD 12/27/2021 11:52 PM

## 2021-12-28 NOTE — OB LABOR/OXYTOCIN SAFETY PROGRESS
Oxytocin Safety Progress Check Note - Ashley Bal 28 y o  female MRN: [id number]    Unit/Bed#: L&D 322-01 Encounter: [id number]    Dose (vel-units/min) Oxytocin: 12 vel-units/min  Contraction Frequency (minutes): 3 5  Contraction Quality: Mild  Tachysystole: No     Cervical Dilation: 4-5  Cervical Effacement: 60  Fetal Station: -3     Baseline Rate: 135 bpm  Fetal Heart Rate: 135 BPM  FHR Category: Category I    Patient comfortable with epidural   Difficulty in tracing fetal heart rate and contractions; nurse at bedside with external monitor  Patient internalized with FSE and IUPC at this time, fetal heart tracing reactive with contractions Q 3 5 minutes  Continue Pitocin titration as able      Vital Signs:    Vitals:    12/28/21 1654   BP: 121/63   Pulse: 84   Resp:    Temp:    SpO2:      Discussed with Dr Tiffanie Hernández MD 12/28/2021 6:20 PM

## 2021-12-28 NOTE — OB LABOR/OXYTOCIN SAFETY PROGRESS
Labor Progress Note - Luana MachucaHuseyin 28 y o  female MRN: [id number]    Unit/Bed#: L&D 322-01 Encounter: [id number]    Dose (vel-units/min) Oxytocin: 12 vel-units/min  Contraction Frequency (minutes): 2-4 (with irritability )  Contraction Quality: Mild  Tachysystole: No   Cervical Dilation: 4        Cervical Effacement: 50  Fetal Station: -3  Baseline Rate: 145 bpm  Fetal Heart Rate: 165 BPM  FHR Category: Category I               Vital Signs:   Vitals:    12/28/21 1325   BP: 116/61   Pulse: 76   Resp:    Temp:            Notes/comments:   Came to discuss plan with pt  Pt still comfortable  Pitocin being titrated but CTX are infrequent sometimes 10+ mins apart  SVE is unchanged  Discussed further augmentation with AROM - pt amenable  AROM attempted but unclear and disruption of membranes was palpable on digital exam although minimal fluid was noted vaginally afterwards  FHT is category I currently          Ofelia Chandler MD 12/28/2021 1:43 PM

## 2021-12-28 NOTE — OB LABOR/OXYTOCIN SAFETY PROGRESS
Oxytocin Safety Progress Check Note - Campbell Bal 28 y o  female MRN: [id number]    Unit/Bed#: L&D 322-01 Encounter: [id number]    Dose (vel-units/min) Oxytocin: 12 vel-units/min  Contraction Frequency (minutes): irregular  Contraction Quality: Mild  Tachysystole: No   Cervical Dilation: 4        Cervical Effacement: 50  Fetal Station: -3  Baseline Rate: 135 bpm  Fetal Heart Rate: 165 BPM  FHR Category: Category I               Vital Signs:   Vitals:    12/28/21 1325   BP: 116/61   Pulse: 76   Resp:    Temp:            Notes/comments: Patient noted to have late decelerations on the FHT  Patient's blood pressure was hypotensive at 74/45  She felt dizzy and had palpitations  Fluids were opened wide, patient was repositioned and pitocin was turned down to 6  Anesthesia came to the bedside and gave ephedrine for BP  Tracing still has moderate variability throughout  Will continue to monitor closely  Dr Divine Duckworth aware          Jenna Acevedo MD 12/28/2021 3:57 PM

## 2021-12-29 PROBLEM — Z98.891 STATUS POST PRIMARY LOW TRANSVERSE CESAREAN SECTION: Status: ACTIVE | Noted: 2021-12-29

## 2021-12-29 LAB
BASE EXCESS BLDCOA CALC-SCNC: -12.1 MMOL/L (ref 3–11)
BASE EXCESS BLDCOV CALC-SCNC: -10.9 MMOL/L (ref 1–9)
HCO3 BLDCOA-SCNC: 18.5 MMOL/L (ref 17.3–27.3)
HCO3 BLDCOV-SCNC: 16.2 MMOL/L (ref 12.2–28.6)
O2 CT VFR BLDCOA CALC: 3.8 ML/DL
OXYHGB MFR BLDCOA: 22.3 %
OXYHGB MFR BLDCOV: 47.2 %
PCO2 BLDCOA: 65.4 MM[HG] (ref 30–60)
PCO2 BLDCOV: 40.7 MM HG (ref 27–43)
PH BLDCOA: 7.07 [PH] (ref 7.23–7.43)
PH BLDCOV: 7.22 [PH] (ref 7.19–7.49)
PO2 BLDCOA: 17.4 MM HG (ref 5–25)
PO2 BLDCOV: 23.9 MM HG (ref 15–45)
SAO2 % BLDCOV: 8.1 ML/DL

## 2021-12-29 PROCEDURE — 82805 BLOOD GASES W/O2 SATURATION: CPT | Performed by: STUDENT IN AN ORGANIZED HEALTH CARE EDUCATION/TRAINING PROGRAM

## 2021-12-29 PROCEDURE — 88307 TISSUE EXAM BY PATHOLOGIST: CPT | Performed by: PATHOLOGY

## 2021-12-29 PROCEDURE — 99024 POSTOP FOLLOW-UP VISIT: CPT | Performed by: OBSTETRICS & GYNECOLOGY

## 2021-12-29 PROCEDURE — 59510 CESAREAN DELIVERY: CPT | Performed by: STUDENT IN AN ORGANIZED HEALTH CARE EDUCATION/TRAINING PROGRAM

## 2021-12-29 RX ORDER — OXYCODONE HYDROCHLORIDE 5 MG/1
10 TABLET ORAL EVERY 4 HOURS PRN
Status: DISCONTINUED | OUTPATIENT
Start: 2021-12-29 | End: 2021-12-31 | Stop reason: HOSPADM

## 2021-12-29 RX ORDER — ACETAMINOPHEN 325 MG/1
650 TABLET ORAL EVERY 6 HOURS PRN
Status: DISCONTINUED | OUTPATIENT
Start: 2021-12-29 | End: 2021-12-29

## 2021-12-29 RX ORDER — SIMETHICONE 80 MG
80 TABLET,CHEWABLE ORAL 4 TIMES DAILY PRN
Status: DISCONTINUED | OUTPATIENT
Start: 2021-12-29 | End: 2021-12-31 | Stop reason: HOSPADM

## 2021-12-29 RX ORDER — IBUPROFEN 600 MG/1
600 TABLET ORAL EVERY 6 HOURS SCHEDULED
Status: DISCONTINUED | OUTPATIENT
Start: 2021-12-30 | End: 2021-12-29

## 2021-12-29 RX ORDER — CEFAZOLIN SODIUM 2 G/50ML
2000 SOLUTION INTRAVENOUS ONCE
Status: COMPLETED | OUTPATIENT
Start: 2021-12-29 | End: 2021-12-29

## 2021-12-29 RX ORDER — MORPHINE SULFATE 1 MG/ML
INJECTION, SOLUTION EPIDURAL; INTRATHECAL; INTRAVENOUS AS NEEDED
Status: DISCONTINUED | OUTPATIENT
Start: 2021-12-29 | End: 2021-12-29

## 2021-12-29 RX ORDER — DIAPER,BRIEF,INFANT-TODD,DISP
1 EACH MISCELLANEOUS DAILY PRN
Status: DISCONTINUED | OUTPATIENT
Start: 2021-12-29 | End: 2021-12-31 | Stop reason: HOSPADM

## 2021-12-29 RX ORDER — KETOROLAC TROMETHAMINE 30 MG/ML
30 INJECTION, SOLUTION INTRAMUSCULAR; INTRAVENOUS EVERY 6 HOURS SCHEDULED
Status: ACTIVE | OUTPATIENT
Start: 2021-12-29 | End: 2021-12-30

## 2021-12-29 RX ORDER — ACETAMINOPHEN 325 MG/1
975 TABLET ORAL EVERY 6 HOURS SCHEDULED
Status: DISCONTINUED | OUTPATIENT
Start: 2021-12-29 | End: 2021-12-31 | Stop reason: HOSPADM

## 2021-12-29 RX ORDER — IBUPROFEN 600 MG/1
600 TABLET ORAL EVERY 6 HOURS PRN
Status: DISCONTINUED | OUTPATIENT
Start: 2021-12-30 | End: 2021-12-31 | Stop reason: HOSPADM

## 2021-12-29 RX ORDER — CALCIUM CARBONATE 200(500)MG
1000 TABLET,CHEWABLE ORAL 3 TIMES DAILY PRN
Status: DISCONTINUED | OUTPATIENT
Start: 2021-12-29 | End: 2021-12-31 | Stop reason: HOSPADM

## 2021-12-29 RX ORDER — DIPHENHYDRAMINE HCL 25 MG
25 TABLET ORAL EVERY 6 HOURS PRN
Status: DISCONTINUED | OUTPATIENT
Start: 2021-12-29 | End: 2021-12-31 | Stop reason: HOSPADM

## 2021-12-29 RX ORDER — DIPHENHYDRAMINE HYDROCHLORIDE 50 MG/ML
25 INJECTION INTRAMUSCULAR; INTRAVENOUS EVERY 6 HOURS PRN
Status: DISCONTINUED | OUTPATIENT
Start: 2021-12-29 | End: 2021-12-29

## 2021-12-29 RX ORDER — MORPHINE SULFATE 0.5 MG/ML
INJECTION, SOLUTION EPIDURAL; INTRATHECAL; INTRAVENOUS
Status: DISPENSED
Start: 2021-12-29 | End: 2021-12-30

## 2021-12-29 RX ORDER — OXYTOCIN/RINGER'S LACTATE 30/500 ML
62.5 PLASTIC BAG, INJECTION (ML) INTRAVENOUS ONCE
Status: DISCONTINUED | OUTPATIENT
Start: 2021-12-29 | End: 2021-12-31 | Stop reason: HOSPADM

## 2021-12-29 RX ORDER — LIDOCAINE HYDROCHLORIDE AND EPINEPHRINE 20; 5 MG/ML; UG/ML
INJECTION, SOLUTION EPIDURAL; INFILTRATION; INTRACAUDAL; PERINEURAL AS NEEDED
Status: DISCONTINUED | OUTPATIENT
Start: 2021-12-29 | End: 2021-12-29

## 2021-12-29 RX ORDER — SODIUM CHLORIDE, SODIUM LACTATE, POTASSIUM CHLORIDE, CALCIUM CHLORIDE 600; 310; 30; 20 MG/100ML; MG/100ML; MG/100ML; MG/100ML
125 INJECTION, SOLUTION INTRAVENOUS CONTINUOUS
Status: DISCONTINUED | OUTPATIENT
Start: 2021-12-29 | End: 2021-12-31 | Stop reason: HOSPADM

## 2021-12-29 RX ORDER — OXYCODONE HYDROCHLORIDE 5 MG/1
5 TABLET ORAL EVERY 4 HOURS PRN
Status: DISCONTINUED | OUTPATIENT
Start: 2021-12-30 | End: 2021-12-31 | Stop reason: HOSPADM

## 2021-12-29 RX ADMIN — SODIUM CHLORIDE 2.5 MILLION UNITS: 9 INJECTION, SOLUTION INTRAVENOUS at 01:30

## 2021-12-29 RX ADMIN — LIDOCAINE HYDROCHLORIDE,EPINEPHRINE BITARTRATE 10 ML: 20; .005 INJECTION, SOLUTION EPIDURAL; INFILTRATION; INTRACAUDAL; PERINEURAL at 14:18

## 2021-12-29 RX ADMIN — CEFAZOLIN SODIUM 2000 MG: 2 SOLUTION INTRAVENOUS at 14:17

## 2021-12-29 RX ADMIN — ACETAMINOPHEN 650 MG: 325 TABLET, FILM COATED ORAL at 03:33

## 2021-12-29 RX ADMIN — LORATADINE 10 MG: 10 TABLET ORAL at 21:03

## 2021-12-29 RX ADMIN — DIPHENHYDRAMINE HCL 25 MG: 25 TABLET, COATED ORAL at 22:39

## 2021-12-29 RX ADMIN — MORPHINE SULFATE 3 MG: 1 INJECTION, SOLUTION EPIDURAL; INTRATHECAL; INTRAVENOUS at 14:52

## 2021-12-29 RX ADMIN — Medication 500 MG: at 14:41

## 2021-12-29 RX ADMIN — ROPIVACAINE HYDROCHLORIDE: 2 INJECTION, SOLUTION EPIDURAL; INFILTRATION at 07:56

## 2021-12-29 RX ADMIN — LIDOCAINE HYDROCHLORIDE,EPINEPHRINE BITARTRATE 5 ML: 20; .005 INJECTION, SOLUTION EPIDURAL; INFILTRATION; INTRACAUDAL; PERINEURAL at 15:10

## 2021-12-29 RX ADMIN — SODIUM CHLORIDE, SODIUM LACTATE, POTASSIUM CHLORIDE, AND CALCIUM CHLORIDE 125 ML/HR: .6; .31; .03; .02 INJECTION, SOLUTION INTRAVENOUS at 20:36

## 2021-12-29 RX ADMIN — SODIUM CHLORIDE, SODIUM LACTATE, POTASSIUM CHLORIDE, AND CALCIUM CHLORIDE 125 ML/HR: .6; .31; .03; .02 INJECTION, SOLUTION INTRAVENOUS at 09:08

## 2021-12-29 RX ADMIN — SERTRALINE HYDROCHLORIDE 25 MG: 50 TABLET ORAL at 22:38

## 2021-12-29 RX ADMIN — LIDOCAINE HYDROCHLORIDE,EPINEPHRINE BITARTRATE 10 ML: 20; .005 INJECTION, SOLUTION EPIDURAL; INFILTRATION; INTRACAUDAL; PERINEURAL at 14:03

## 2021-12-29 RX ADMIN — SODIUM CHLORIDE 2.5 MILLION UNITS: 9 INJECTION, SOLUTION INTRAVENOUS at 12:51

## 2021-12-29 RX ADMIN — SODIUM CHLORIDE 2.5 MILLION UNITS: 9 INJECTION, SOLUTION INTRAVENOUS at 07:27

## 2021-12-29 RX ADMIN — Medication 250 MILLI-UNITS/MIN: at 15:09

## 2021-12-29 RX ADMIN — ACETAMINOPHEN 975 MG: 325 TABLET, FILM COATED ORAL at 17:38

## 2021-12-29 RX ADMIN — KETOROLAC TROMETHAMINE 30 MG: 30 INJECTION, SOLUTION INTRAMUSCULAR; INTRAVENOUS at 22:38

## 2021-12-29 NOTE — DISCHARGE INSTRUCTIONS
Section   WHAT YOU SHOULD KNOW:   A  delivery, or , is abdominal surgery to deliver your baby  There are many reasons you may need a   · A  may be scheduled before labor if you had a  with your last baby  It may be scheduled if your baby is not positioned normally, or you are pregnant with more than 1 baby  · Your caregiver may perform an emergency  during labor to prevent life-threatening complications for you or your baby  A  may be done if your cervix does not dilate after several hours of active labor  · Other reasons for a  include maternal infections and problems with the placenta  AFTER YOU LEAVE:   Medicines:   · Prescription pain medicine  may be given  Ask how to take this medicine safely  · Acetaminophen  decreases pain and fever  It is available without a doctor's order  Ask how much to take and how often to take it  Follow directions  Acetaminophen can cause liver damage if not taken correctly  · NSAIDs  help decrease swelling and pain or fever  This medicine is available with or without a doctor's order  NSAIDs can cause stomach bleeding or kidney problems in certain people  If you take blood thinner medicine, always ask your obstetrician if NSAIDs are safe for you  Always read the medicine label and follow directions  · Take your medicine as directed  Contact your obstetrician (OB) if you think your medicine is not helping or if you have side effects  Tell him if you are allergic to any medicine  Keep a list of the medicines, vitamins, and herbs you take  Include the amounts, and when and why you take them  Bring the list or the pill bottles to follow-up visits  Carry your medicine list with you in case of an emergency  Follow up with your OB as directed: You may need to return to have your stitches or staples removed  Write down your questions so you remember to ask them during your visits    Wound care:  Carefully wash your wound with soap and water every day  Keep your wound clean and dry  Wear loose, comfortable clothes that do not rub against your wound  Ask your OB about bathing and showering  Drink plenty of liquids: You can lower your risk for a blood clot if you drink plenty of liquids  Ask how much liquid to drink each day and which liquids are best for you  Limit activity until you have fully recovered from surgery:   · Ask when it is safe for you to drive, walk up stairs, lift heavy objects, and have sex  · Ask when it is okay to exercise, and what types of exercise to do  Start slowly and do more as you get stronger  Contact your OB if:   · You have heavy vaginal bleeding that fills 1 or more sanitary pads in 1 hour  · You have a fever  · Your incision is swollen, red, or draining pus  · You have questions or concerns about yourself or your baby  Seek care immediately or call 911 if:   · Blood soaks through your bandage  · Your stitches come apart  · You feel lightheaded, short of breath, and have chest pain  · You cough up blood  · Your arm or leg feels warm, tender, and painful  It may look swollen and red  © 2014 [address] is for End User's use only and may not be sold, redistributed or otherwise used for commercial purposes  All illustrations and images included in CareNotes® are the copyrighted property of A D A M [employer] Inc  or Kristopher Lucas  The above information is an  only  It is not intended as medical advice for individual conditions or treatments  Talk to your doctor, nurse or pharmacist before following any medical regimen to see if it is safe and effective for you

## 2021-12-29 NOTE — OB LABOR/OXYTOCIN SAFETY PROGRESS
Oxytocin Safety Progress Check Note - Sunday Bal 28 y o  female MRN: [id number]    Unit/Bed#: L&D 322-01 Encounter: [id number]    Dose (vel-units/min) Oxytocin: 24 vel-units/min  Contraction Frequency (minutes): 1 5-4  Contraction Quality: Moderate  Tachysystole: No     Cervical Dilation: 5  Cervical Effacement: 70  Fetal Station: -3     Baseline Rate: 145 bpm  Fetal Heart Rate: 145 BPM  FHR Category: Category I    Pt resting comfortably  No ricketts hardik SVE, MVUs not adequate  Will continue pit titration as able      Vital Signs:   Vitals:    12/28/21 2300   BP: 110/59   Pulse:    Resp:    Temp:    SpO2:      D/w Dr Srinivasan Estrada MD 12/29/2021 12:33 AM

## 2021-12-29 NOTE — OB LABOR/OXYTOCIN SAFETY PROGRESS
Obstetrics Labor Progress Note    Assessment and Plan: Robert Quiñones 28 y o   at 40w1d for labor induction  Discussed with patient that she meets criteria for failed IOL based on adequate contractions with 18 hours of ruptured membranes  She is agreeable to  delivery  We discussed risks as documented in consent form  Labor induction:  - FHT: Category I  - Labor induction: s/p cytotec, AROM, Oxytocin, IUPC in place with adequate contractions  - Labor analgesia: Epidural    Pregnancy Risks:  Pregnancy Problems (from 06/15/21 to present)     Problem Noted Resolved    Macrosomia affecting management of mother in third trimester 2021 by Kan Wills MD No    39 weeks gestation of pregnancy 2021 by Mayur Medina MD No          Subjective:   Comfortable        Objective:  /56   Pulse 91   Temp 98 2 °F (36 8 °C) (Oral)   Resp 18   Ht 5' 10" (1 778 m)   Wt (!) 139 kg (306 lb)   LMP 2021 (Exact Date)   SpO2 100%   BMI 43 91 kg/m²     Fetal Heart Tracing:  Baseline Rate: 135 bpm  Variability: moderate  Accelerations: present  Decelerations: absent  FHR Category: Category I    Cervical Exam:  Cervical Dilation: 5  Cervical Effacement: 70  Fetal Station: -3    Oxytocin Administration:  Dose (vel-units/min) Oxytocin: 0 vel-units/min  Contraction Frequency (minutes): 2-3 5  Contraction Quality: Moderate  Tachysystole: No    Blayne Bajwa MD  [facility]  [employer]  21 9:07 AM

## 2021-12-29 NOTE — PLAN OF CARE
Problem: BIRTH - VAGINAL/ SECTION  Goal: Fetal and maternal status remain reassuring during the birth process  Description: INTERVENTIONS:  - Monitor vital signs  - Monitor fetal heart rate  - Monitor uterine activity  - Monitor labor progression (vaginal delivery)  - DVT prophylaxis  - Antibiotic prophylaxis  2021 by Jordy Mcclure RN  Outcome: Progressing  2021 by Jordy Mcclure RN  Outcome: Progressing  Goal: Emotionally satisfying birthing experience for mother/fetus  Description: Interventions:  - Assess, plan, implement and evaluate the nursing care given to the patient in labor  - Advocate the philosophy that each childbirth experience is a unique experience and support the family's chosen level of involvement and control during the labor process   - Actively participate in both the patient's and family's teaching of the birth process  - Consider cultural, Yazidism and age-specific factors and plan care for the patient in labor  2021 by Jordy Mcclure RN  Outcome: Progressing  2021 by Jordy Mcclure RN  Outcome: Progressing     Problem: PAIN - ADULT  Goal: Verbalizes/displays adequate comfort level or baseline comfort level  Description: Interventions:  - Encourage patient to monitor pain and request assistance  - Assess pain using appropriate pain scale  - Administer analgesics based on type and severity of pain and evaluate response  - Implement non-pharmacological measures as appropriate and evaluate response  - Consider cultural and social influences on pain and pain management  - Notify physician/advanced practitioner if interventions unsuccessful or patient reports new pain  2021 by Jordy Mcclure RN  Outcome: Progressing  2021 by Jordy Mcclure RN  Outcome: Progressing     Problem: DISCHARGE PLANNING  Goal: Discharge to home or other facility with appropriate resources  Description: INTERVENTIONS:  - Identify barriers to discharge w/patient and caregiver  - Arrange for needed discharge resources and transportation as appropriate  - Identify discharge learning needs (meds, wound care, etc )  - Arrange for interpretive services to assist at discharge as needed  - Refer to Case Management Department for coordinating discharge planning if the patient needs post-hospital services based on physician/advanced practitioner order or complex needs related to functional status, cognitive ability, or social support system  2021 173 by Carole Montesinos RN  Outcome: Progressing  2021 by Carole Montesinos RN  Outcome: Progressing     Problem: POSTPARTUM  Goal: Experiences normal postpartum course  Description: INTERVENTIONS:  - Monitor maternal vital signs  - Assess uterine involution and lochia  2021 173 by Carole Montesinos RN  Outcome: Progressing  2021 by Carole Montesinos RN  Outcome: Progressing  Goal: Appropriate maternal -  bonding  Description: INTERVENTIONS:  - Identify family support  - Assess for appropriate maternal/infant bonding   -Encourage maternal/infant bonding opportunities  - Referral to  or  as needed  2021 173 by Carole Montesinos RN  Outcome: Progressing  2021 by Carole Montesinos RN  Outcome: Progressing  Goal: Establishment of infant feeding pattern  Description: INTERVENTIONS:  - Assess breast/bottle feeding  - Refer to lactation as needed  2021 by Carole Montesinos RN  Outcome: Progressing  2021 by Carole Montesinos RN  Outcome: Progressing  Goal: Incision(s), wounds(s) or drain site(s) healing without S/S of infection  Description: INTERVENTIONS  - Assess and document dressing, incision, wound bed, drain sites and surrounding tissue  - Provide patient and family education    2021 by aCrole Montesinos RN  Outcome: Progressing  2021 by Carole Montesinos RN  Outcome: Progressing     Problem: Knowledge Deficit  Goal: Verbalizes understanding of labor plan  Description: Assess patient/family/caregiver's baseline knowledge level and ability to understand information  Provide education via patient/family/caregiver's preferred learning method at appropriate level of understanding  1  Provide teaching at level of understanding  2  Provide teaching via preferred learning method(s)  12/29/2021 1739 by Annette Sawyer RN  Outcome: Completed  12/29/2021 1739 by Annette Sawyer RN  Outcome: Progressing  Goal: Patient/family/caregiver demonstrates understanding of disease process, treatment plan, medications, and discharge instructions  Description: Complete learning assessment and assess knowledge base  Interventions:  - Provide teaching at level of understanding  - Provide teaching via preferred learning methods  12/29/2021 1739 by Annette Sawyer RN  Outcome: Completed  12/29/2021 1739 by Annette Sawyer RN  Outcome: Progressing     Problem: Labor & Delivery  Goal: Manages discomfort  Description: Assess and monitor for signs and symptoms of discomfort  Assess patient's pain level regularly and per hospital policy  Administer medications as ordered  Support use of nonpharmacological methods to help control pain such as distraction, imagery, relaxation, and application of heat and cold  Collaborate with interdisciplinary team and patient to determine appropriate pain management plan  1  Include patient in decisions related to comfort  2  Offer non-pharmacological pain management interventions  3  Report ineffective pain management to physician  12/29/2021 1739 by Annette Sawyer RN  Outcome: Completed  12/29/2021 1739 by Annette Sawyer RN  Outcome: Progressing  Goal: Patient vital signs are stable  Description: 1  Assess vital signs - vaginal delivery    12/29/2021 1739 by Annette Sawyer RN  Outcome: Completed  12/29/2021 1739 by Annette Sawyer RN  Outcome: Progressing

## 2021-12-29 NOTE — UTILIZATION REVIEW
Initial Clinical Review    Admission: Date/Time/Statement:   Admission Orders (From admission, onward)     Ordered        12/27/21 1501  Inpatient Admission  Once                      Orders Placed This Encounter   Procedures    Inpatient Admission     Standing Status:   Standing     Number of Occurrences:   1     Order Specific Question:   Level of Care     Answer:   Med Surg [16]     Order Specific Question:   Estimated length of stay     Answer:   More than 2 Midnights     Order Specific Question:   Certification     Answer:   I certify that inpatient services are medically necessary for this patient for a duration of greater than two midnights  See H&P and MD Progress Notes for additional information about the patient's course of treatment  ED Arrival Information     Patient not seen in ED                     Chief Complaint   Patient presents with    Decreased Fetal Movement     not much movement since last pm     Scheduled Induction     due to oligo       Initial Presentation:  ON 12/27 28 y o  [id number]  at 39w6d weeks gestation admit Inpatient for IOL for oligohydramnios  SVE deferred -previously 1cm, checking at time of mahan/cytotec placement, vertex on TAUS  IOL incl  several cytotec, AROM, IV Pit, IUPC w adequate contractions, epidural  C Section delivery due to failed IOL based on adequate contractions with 18 hours of ruptured membranes  Viable Baby girl born 12/29 1450 birthwt 4000 g (8 lb 13 1 oz)  , apgars 7&9     12/27 6:50 PM first dose of vaginal cytotec placed  Will attempt mahan balloon placement with next check  Contraction Quality: Mild  Tachysystole: No   Cervical Dilation: Fingertip  Date: 12/28   Day 2:   4:29 AM Cervical Dilation: 1, placed blindly without incident  Patient to receive third dose of cytotec orally     1:41 PM Oxytocin: 12 vel-units/min Cervical Dilation: 4  AROM attempted but unclear and disruption of membranes was palpable on digital exam although minimal fluid was noted vaginally afterwards  2021 2:46 PM epidural      12:33 AM Oxytocin: 24 vel-units/min   Cervical Dilation: 5   9:07 AM meets criteria for failed IOL based on adequate contractions with 18 hours of ruptured membranes  Cervical Dilation: 5  Dose (vel-units/min) Oxytocin: 0 vel-units/min Contraction Frequency (minutes): 2-3  She is agreeable to  delivery   SURGERY DATE: 2021 145  Procedure(s) (LRB):   SECTION () (N/A)   1450 Viable baby Girl , birthwt 4000 g (8 lb 13 1 oz)  , apgars 7&9     Triage Vitals   Temperature Pulse Respirations Blood Pressure SpO2   21 1400 21 1400 21 1715 21 1400 21 1433   98 1 °F (36 7 °C) 96 16 137/82 96 %      Temp Source Heart Rate Source Patient Position - Orthostatic VS BP Location FiO2 (%)   21 1400 21 1715 21 1400 21 1400 --   Axillary Monitor Sitting Left arm       Pain Score       21 1400       No Pain          Wt Readings from Last 1 Encounters:   21 (!) 139 kg (306 lb)     Additional Vital Signs:   Date/Time Temp Pulse Resp BP SpO2 O2 Device Cardiac (WDL) Patient Position - Orthostatic VS   21 1545 -- 85 16 110/59 100 % None (Room air) WDL Lying   21 1535 99 4 °F (37 4 °C) 83 16 120/70 100 % None (Room air) WDL Lying     21 0725 97 7 °F (36 5 °C) -- -- -- -- -- -- --   21 0700 -- 74 -- 123/59 -- -- -- --   21 0558 -- 80 -- 139/77 -- -- -- --   21 0437 98 °F (36 7 °C) -- -- -- -- -- -- --     21 1715 98 2 °F (36 8 °C) 96 16 124/72 -- -- -- Lying   21 1400 98 1 °F (36 7 °C) 96 -- 137/82 -- -- -- Sitting       Weights (last 14 days)    Date/Time Weight Height   21 1715 139 kg (306 lb) Abnormal  5' 10" (1 778 m)   21 1400 139 kg (306 lb) Abnormal  5' 10" (1 778 m)           Pertinent Labs/Diagnostic Test Results:       Results from last 7 days   Lab Units 21  1747   WBC Thousand/uL 10 67* HEMOGLOBIN g/dL 11 0*   HEMATOCRIT % 33 0*   PLATELETS Thousands/uL 206               Results from last 7 days   Lab Units 12/27/21  1731   AMPH/METH  Negative   BARBITURATE UR  Negative   BENZODIAZEPINE UR  Negative   COCAINE UR  Negative   METHADONE URINE  Negative   OPIATE UR  Negative   PCP UR  Negative   THC UR  Negative     ED Treatment:   Medication Administration - No Administrations Displayed (No Start Event Found)     None        Past Medical History:   Diagnosis Date    Alcohol abuse     in past    Anxiety     Arthritis     bilateral hips    Cyst of breast, left     Depression     Female infertility     Lump of left breast     LAST ASSESSED: 4/13/17    PCOS (polycystic ovarian syndrome)     Polycystic ovary syndrome     Substance abuse (HCC)     Marijuana use in past    Varicella      Present on Admission:   Generalized anxiety disorder   History of drug use   Mild episode of recurrent major depressive disorder ([facility] [place] [address] )   Maternal obesity syndrome in third trimester      Admitting Diagnosis: Decreased fetal movement [O36 8190]  Age/Sex: 28 y o  female  Admission Orders:  continuous external uterine contraction & fetal HR monitoring  Scheduled Medications:  loratadine, 10 mg, Oral, Daily  morphine, , ,   penicillin G, 2 5 Million Units, Intravenous, Q4H  sertraline, 25 mg, Oral, Daily    Continuous IV Infusions:  lactated ringers, 125 mL/hr, Intravenous, Continuous  oxytocin, 1-30 vel-units/min, Intravenous, Titrated  ropivacaine 0 2%, , Epidural, Continuous      PRN Meds:  acetaminophen, 650 mg, Oral, Q6H PRN  doxylamine, 25 mg, Oral, HS PRN  ondansetron, 4 mg, Intravenous, Q6H PRN  IP CONSULT TO ANESTHESIOLOGY    Network Utilization Review Department  ATTENTION: Please call with any questions or concerns to [phone] and carefully listen to the prompts so that you are directed to the right person   All voicemails are confidential   Candia Siemens all requests for admission clinical reviews, approved or denied determinations and any other requests to dedicated fax number below belonging to the campus where the patient is receiving treatment   List of dedicated fax numbers for the Facilities:  [address] [place] [address] DENIALS (Administrative/Medical Necessity) [phone]   [address] (Maternity/NICU/Pediatrics) [phone] [address] [place] [address]  [address] [facility] [place] [address] [address] [place] [address] Joe Sofy [address] [facility] [place] [address] [phone]

## 2021-12-29 NOTE — OB LABOR/OXYTOCIN SAFETY PROGRESS
Oxytocin Safety Progress Check Note - Chantal FarfanioYesika 28 y o  female MRN: [id number]    Unit/Bed#: L&D 322-01 Encounter: [id number]    Dose (vel-units/min) Oxytocin: 18 vel-units/min  Contraction Frequency (minutes): 4  Contraction Quality: Mild  Tachysystole: No     Cervical Dilation: 5  Cervical Effacement: 70  Fetal Station: -3     Baseline Rate: 135 bpm  Fetal Heart Rate: 135 BPM  FHR Category: Category I    Pt comfortable overall  Slight change as above  FHT reactive, ctx q4min  Continue pit titration as able      Vital Signs:   Vitals:    12/28/21 1912   BP: 98/54   Pulse: 100   Resp:    Temp:    SpO2:        D/w Dr Felton Sue MD 12/28/2021 9:36 PM

## 2021-12-29 NOTE — OP NOTE
PERATIVE REPORT  PATIENT NAME: Rhea Nieves    :  1990  MRN: [id number]  Pt Location: [facility] L[facility] OR ROOM 01    SURGERY DATE: 2021     Section Procedure Note    Indications:   Failed IOL    Pre-operative Diagnosis:   40w1d pregnancy  BMI 44  History of placental abruption   Anxiety   THC Use   Single Umbilical Artery    Post-operative Diagnosis:   1RLTCS     Attending: Bakari Cobb MD  Resident: Bolivar Hickey MD    Maternal Findings:  Normal uterus  Normal tubes and ovaries bilaterally  No adhesions  No difficulty noted from skin to delivery     Findings:  Viable female weighing 8lbs 13 1oz;  Apgar scores of 7 at one minute and 9 at five minutes  Meconium stained amniotic fluid  Terminal meconium  Normal placenta with 3-vessel cord    Arterial and Venous Gases:  Umbilical Cord Venous Blood Gas:  Results from last 7 days   Lab Units 21  1446   PH COV  7 219   PCO2 COV mm HG 40 7   HCO3 COV mmol/L 16 2   BASE EXC COV mmol/L -10 9*   O2 CT CD VB mL/dL 8 1   O2 HGB, VENOUS CORD % 77 8     Umbilical Cord Arterial Blood Gas:  Results from last 7 days   Lab Units 21  1446   PH COA  7 070*   PCO2 COA  65 4*   PO2 COA mm HG 17 4   HCO3 COA mmol/L 18 5   BASE EXC COA mmol/L -12 1*   O2 CONTENT CORD ART ml/dl 3 8   O2 HGB, ARTERIAL CORD % 22 3       Specimens: Arterial and venous cord gases, cord blood, segment of umbilical cord, placenta to storage    Quantitative Blood Loss: 535 mL    Drains: Mcmahon catheter           Complications:  None; patient tolerated the procedure well  Disposition: PACU            Condition: stable    Procedure Details   The patient was seen prior to the procedure  Risks, benefits, possible complications, alternate treatment options, and expected outcomes were discussed with the patient  The patient agreed with the proposed plan and gave informed consent for a primary  delivery        The patient was taken to the [facility] Operating Room where she received a rebolus of her epidural  For infection prophylaxis, she received 2g ancef and 500mg IV Azithromycin preoperatively  Fetal heart tones in the OR were assessed and noted to be within normal limits and a Mcmahon catheter and SCDs were placed  The abdomen was prepped with Chloraprep, the vagina was prepped with Betadine, and following appropriate drying time, the patient was draped in the usual sterile manner  A Time Out was held and the above information confirmed  The patient was identified as Sean Dela Cruz and the procedure verified as a  Delivery for failed induction of labor  A Pfannenstiel incision was made and carried down through the underlying subcutaneous tissue to the fascia using a scalpel  The rectus fascia was then nicked in the midline and dissected laterally using Landa scissors  The superior edge of the fascial incision was grasped with Kocher clamps bilaterally, tented upward and the underlying rectus muscles were dissected off sharply with Landa scissors  This was repeated on the inferior edge of the fascia and dissected down to the pubic rami  The rectus muscles were  and the peritoneum was identified, entered, and extended longitudinally with blunt dissection  The bladder blade was inserted  The bladder blade was repositioned  A low transverse uterine incision was made with the scalpel and extended cephalocaudad with blunt dissection  The amnion was entered bluntly  The fetal head was palpated, elevated, and delivered through the uterine incision followed by the body without difficulty  Time of birth was noted at 1450  There was noted to be spontaneous cry and good tone  There was no apparent injury to the   The umbilical cord was doubly clamped and cut after 30 seconds to allow for delayed cord clamping  The infant was handed off to the  providers    Arterial and venous cord gases, cord blood, and a segment of umbilical cord were obtained for evaluation  The placenta delivered spontaneously at [phone] with uterine fundal massage and appeared normal  The uterus was exteriorized and cleaned out with a moist lap sponge  The uterine incision was closed with a running locked suture of 0 Vicryl  A second layer of the same suture was used to imbricate the first   Hemostasis was noted to be excellent  Moist lap sponges were used to irrigate the posterior culdesac  The uterus was returned to the abdomen  The fascia was closed with a running suture of 0 Vicryl  Subcutaneous adipose tissue was closed with a running suture of 2-0 Plain gut in 2 layers  The skin was closed with a subcuticular running suture of 4-0 Stratafix  Exophin was applied to the incision  The patient appeared to tolerate the procedure very well  Lap sponge, needle, and instrument counts were correct x2  The patient's fundus was palpated and the uterus was expressed  She was then cleaned and transferred to her postpartum recovery room in stable condition and her infant went to the  nursery  Attending Attestation: Dr Rachel Zuniga MD was present for the entire procedure      Hao Workman MD   OB/GYN, PGY-2  2021 5:47 PM

## 2021-12-29 NOTE — DISCHARGE SUMMARY
Discharge Summary - OB/GYN   Nikia Peres 28 y o  female MRN: [id number]  Unit/Bed#: L&D 312-01 Encounter: [id number]      Admission Date: 2021     Discharge Date: 21    Admitting Diagnosis:   40w1d pregnancy  BMI 44  History of placental abruption   Anxiety   THC Use   Single Umbilical Artery    Discharge Diagnosis:   Same, delivered  Failed IOL    Procedures: primary  section, low transverse incision    Delivery Attending: Mayte Ferrell MD  Discharge Attending: Dr Radha Chavez Course:     Nikia Peres is a 28 y o   at 40w1d wks who was initially admitted for induction secondary to decreased fetal movement and low fluid  Her pregnancy was complicated by the above  Her induction was started with cytotec and mahan balloon  Pitocin was initiated and she was ruptured  She did not make change and met criteria for failed IOL and was consented for primary  delivery  She delivered a viable female  on  at 1450  Weight 8lbs 13 1oz via primary  section, low transverse incision  Apgars were 7 (1 min) and 9 (5 min)   was transferred to  nursery  Patient tolerated the procedure well and was transferred to recovery in stable condition  Her post-operative course was complicated by oliguria, which resolved after fluid resuscitation  Preoperative hemoglobin was 11 0, postoperative was 9 9  Her postoperative pain was well controlled with oral analgesics  On day of discharge, she was ambulating and able to reasonably perform all ADLs  She was voiding and had appropriate bowel function  Pain was well controlled  She was discharged home on post-operative day #2 without complications  Patient was instructed to follow up with her OB as an outpatient and was given appropriate warnings to call provider if she develops signs of infection or uncontrolled pain      Complications: none apparent    Condition at discharge: good     Discharge instructions/Information to patient and family:   See after visit summary for information provided to patient and family  Provisions for Follow-Up Care:  See after visit summary for information related to follow-up care and any pertinent home health orders  Disposition: See After Visit Summary for discharge disposition information  Planned Readmission: No    Discharge Medications:   For a complete list of the patient's medications, please refer to her med rec       6:23 PM  12/29/2021

## 2021-12-30 LAB
ERYTHROCYTE [DISTWIDTH] IN BLOOD BY AUTOMATED COUNT: 14.1 % (ref 11.6–15.1)
HCT VFR BLD AUTO: 30.2 % (ref 34.8–46.1)
HGB BLD-MCNC: 9.9 G/DL (ref 11.5–15.4)
MCH RBC QN AUTO: 30.1 PG (ref 26.8–34.3)
MCHC RBC AUTO-ENTMCNC: 32.8 G/DL (ref 31.4–37.4)
MCV RBC AUTO: 92 FL (ref 82–98)
PLATELET # BLD AUTO: 185 THOUSANDS/UL (ref 149–390)
PMV BLD AUTO: 10.7 FL (ref 8.9–12.7)
RBC # BLD AUTO: 3.29 MILLION/UL (ref 3.81–5.12)
WBC # BLD AUTO: 13.27 THOUSAND/UL (ref 4.31–10.16)

## 2021-12-30 PROCEDURE — 85027 COMPLETE CBC AUTOMATED: CPT | Performed by: OBSTETRICS & GYNECOLOGY

## 2021-12-30 PROCEDURE — 99024 POSTOP FOLLOW-UP VISIT: CPT | Performed by: OBSTETRICS & GYNECOLOGY

## 2021-12-30 RX ORDER — KETOROLAC TROMETHAMINE 30 MG/ML
30 INJECTION, SOLUTION INTRAMUSCULAR; INTRAVENOUS EVERY 6 HOURS SCHEDULED
Status: COMPLETED | OUTPATIENT
Start: 2021-12-30 | End: 2021-12-30

## 2021-12-30 RX ADMIN — ENOXAPARIN SODIUM 40 MG: 40 INJECTION SUBCUTANEOUS at 21:06

## 2021-12-30 RX ADMIN — ACETAMINOPHEN 975 MG: 325 TABLET, FILM COATED ORAL at 14:04

## 2021-12-30 RX ADMIN — LORATADINE 10 MG: 10 TABLET ORAL at 21:06

## 2021-12-30 RX ADMIN — ACETAMINOPHEN 975 MG: 325 TABLET, FILM COATED ORAL at 20:04

## 2021-12-30 RX ADMIN — KETOROLAC TROMETHAMINE 30 MG: 30 INJECTION, SOLUTION INTRAMUSCULAR; INTRAVENOUS at 07:38

## 2021-12-30 RX ADMIN — SODIUM CHLORIDE, SODIUM LACTATE, POTASSIUM CHLORIDE, AND CALCIUM CHLORIDE 125 ML/HR: .6; .31; .03; .02 INJECTION, SOLUTION INTRAVENOUS at 08:44

## 2021-12-30 RX ADMIN — ACETAMINOPHEN 975 MG: 325 TABLET, FILM COATED ORAL at 07:37

## 2021-12-30 RX ADMIN — SERTRALINE HYDROCHLORIDE 25 MG: 50 TABLET ORAL at 21:06

## 2021-12-30 RX ADMIN — SODIUM CHLORIDE, SODIUM LACTATE, POTASSIUM CHLORIDE, AND CALCIUM CHLORIDE 999 ML/HR: .6; .31; .03; .02 INJECTION, SOLUTION INTRAVENOUS at 04:54

## 2021-12-30 RX ADMIN — KETOROLAC TROMETHAMINE 30 MG: 30 INJECTION, SOLUTION INTRAMUSCULAR; INTRAVENOUS at 14:04

## 2021-12-30 RX ADMIN — DIPHENHYDRAMINE HCL 25 MG: 25 TABLET, COATED ORAL at 04:37

## 2021-12-30 RX ADMIN — ENOXAPARIN SODIUM 40 MG: 40 INJECTION SUBCUTANEOUS at 08:04

## 2021-12-30 RX ADMIN — ACETAMINOPHEN 975 MG: 325 TABLET, FILM COATED ORAL at 01:59

## 2021-12-30 NOTE — CASE MANAGEMENT
Case Management Progress Note    Patient name Lulu Lance  Location L&D 312/L&D 623-23 MRN [id number]  : 1990 Date 2021       LOS (days): 3  Geometric Mean LOS (GMLOS) (days):   Days to GMLOS:        OBJECTIVE:        Current admission status: Inpatient  Preferred Pharmacy:   [facility], [address] [place] [address] [address] [facility] [address]  Phone: [phone] Fax: [phone]    Primary Care Provider: OSCAR Martinez    Primary Insurance: BLUE CROSS  Secondary Insurance:     PROGRESS NOTE:    Consult(s):  MOB hx THC    SW met w/MOB who provided the following information:      · Baby's name/gender: Shwetha Benson  · Mother of baby: Lulu Lance  · Father of baby//SO: Beba Ambrocio  · Other Legal Guardian(s) for baby: no  · Alternate emergency contact: Maternal grandmother Reyna Estradals [phone]  · Other children: 7 y/o boy Haze Buerger (not biologically related to MOB)  · Lives with: MOB, FOB, 7 y/o son  · Support System: family and friends  · Baby Supplies: have all  · Bottle or Breast Feeding: both  · Breast Pump if breast feeding: has 2 pumps at home  · [employer] Assistance Programs/WIC/EBT/SSI: no  · 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

## 2021-12-30 NOTE — PLAN OF CARE
Problem: BIRTH - VAGINAL/ SECTION  Goal: Fetal and maternal status remain reassuring during the birth process  Description: INTERVENTIONS:  - Monitor vital signs  - Monitor fetal heart rate  - Monitor uterine activity  - Monitor labor progression (vaginal delivery)  - DVT prophylaxis  - Antibiotic prophylaxis  Outcome: Progressing  Goal: Emotionally satisfying birthing experience for mother/fetus  Description: Interventions:  - Assess, plan, implement and evaluate the nursing care given to the patient in labor  - Advocate the philosophy that each childbirth experience is a unique experience and support the family's chosen level of involvement and control during the labor process   - Actively participate in both the patient's and family's teaching of the birth process  - Consider cultural, Druze and age-specific factors and plan care for the patient in labor  Outcome: Progressing     Problem: PAIN - ADULT  Goal: Verbalizes/displays adequate comfort level or baseline comfort level  Description: Interventions:  - Encourage patient to monitor pain and request assistance  - Assess pain using appropriate pain scale  - Administer analgesics based on type and severity of pain and evaluate response  - Implement non-pharmacological measures as appropriate and evaluate response  - Consider cultural and social influences on pain and pain management  - Notify physician/advanced practitioner if interventions unsuccessful or patient reports new pain  Outcome: Progressing     Problem: DISCHARGE PLANNING  Goal: Discharge to home or other facility with appropriate resources  Description: INTERVENTIONS:  - Identify barriers to discharge w/patient and caregiver  - Arrange for needed discharge resources and transportation as appropriate  - Identify discharge learning needs (meds, wound care, etc )  - Arrange for interpretive services to assist at discharge as needed  - Refer to Case Management Department for coordinating discharge planning if the patient needs post-hospital services based on physician/advanced practitioner order or complex needs related to functional status, cognitive ability, or social support system  Outcome: Progressing     Problem: POSTPARTUM  Goal: Experiences normal postpartum course  Description: INTERVENTIONS:  - Monitor maternal vital signs  - Assess uterine involution and lochia  Outcome: Progressing  Goal: Appropriate maternal -  bonding  Description: INTERVENTIONS:  - Identify family support  - Assess for appropriate maternal/infant bonding   -Encourage maternal/infant bonding opportunities  - Referral to  or  as needed  Outcome: Progressing  Goal: Establishment of infant feeding pattern  Description: INTERVENTIONS:  - Assess breast/bottle feeding  - Refer to lactation as needed  Outcome: Progressing  Goal: Incision(s), wounds(s) or drain site(s) healing without S/S of infection  Description: INTERVENTIONS  - Assess and document dressing, incision, wound bed, drain sites and surrounding tissue  - Provide patient and family education    Outcome: Progressing

## 2021-12-30 NOTE — PLAN OF CARE
Problem: BIRTH - VAGINAL/ SECTION  Goal: Fetal and maternal status remain reassuring during the birth process  Description: INTERVENTIONS:  - Monitor vital signs  - Monitor fetal heart rate  - Monitor uterine activity  - Monitor labor progression (vaginal delivery)  - DVT prophylaxis  - Antibiotic prophylaxis  Outcome: Progressing  Goal: Emotionally satisfying birthing experience for mother/fetus  Description: Interventions:  - Assess, plan, implement and evaluate the nursing care given to the patient in labor  - Advocate the philosophy that each childbirth experience is a unique experience and support the family's chosen level of involvement and control during the labor process   - Actively participate in both the patient's and family's teaching of the birth process  - Consider cultural, Islam and age-specific factors and plan care for the patient in labor  Outcome: Progressing     Problem: PAIN - ADULT  Goal: Verbalizes/displays adequate comfort level or baseline comfort level  Description: Interventions:  - Encourage patient to monitor pain and request assistance  - Assess pain using appropriate pain scale  - Administer analgesics based on type and severity of pain and evaluate response  - Implement non-pharmacological measures as appropriate and evaluate response  - Consider cultural and social influences on pain and pain management  - Notify physician/advanced practitioner if interventions unsuccessful or patient reports new pain  Outcome: Progressing     Problem: DISCHARGE PLANNING  Goal: Discharge to home or other facility with appropriate resources  Description: INTERVENTIONS:  - Identify barriers to discharge w/patient and caregiver  - Arrange for needed discharge resources and transportation as appropriate  - Identify discharge learning needs (meds, wound care, etc )  - Arrange for interpretive services to assist at discharge as needed  - Refer to Case Management Department for coordinating discharge planning if the patient needs post-hospital services based on physician/advanced practitioner order or complex needs related to functional status, cognitive ability, or social support system  Outcome: Progressing     Problem: POSTPARTUM  Goal: Experiences normal postpartum course  Description: INTERVENTIONS:  - Monitor maternal vital signs  - Assess uterine involution and lochia  Outcome: Progressing  Goal: Appropriate maternal -  bonding  Description: INTERVENTIONS:  - Identify family support  - Assess for appropriate maternal/infant bonding   -Encourage maternal/infant bonding opportunities  - Referral to  or  as needed  Outcome: Progressing  Goal: Establishment of infant feeding pattern  Description: INTERVENTIONS:  - Assess breast/bottle feeding  - Refer to lactation as needed  Outcome: Progressing  Goal: Incision(s), wounds(s) or drain site(s) healing without S/S of infection  Description: INTERVENTIONS  - Assess and document dressing, incision, wound bed, drain sites and surrounding tissue  - Provide patient and family education    Outcome: Progressing

## 2021-12-30 NOTE — LACTATION NOTE
This note was copied from a baby's chart  Met with mother and father  Provided  with Ready, Set, Baby booklet  Discussed Skin to Skin contact an benefits to mom and baby  Talked about the delay of the first bath until baby has adjusted  Spoke about the benefits of rooming in  Feeding on cue and what that means for recognizing infant's hunger  Avoidance of pacifiers for the first month discussed  Talked about exclusive breastfeeding for the first 6 months  Positioning and latch reviewed as well as showing images of other feeding positions  Discussed the properties of a good latch in any position  Reviewed hand/manual expression  Mom hand expressed  prior to latch attempts  Started on right breast using football hold  Mom states "she does not like this side as much" after a few short latches  Baby then switched to left breast using football hold  Baby with quick latch but mostly piston sucks for short latches  Eventually baby converted to rocker suckling and sucked for 10 minutes till popped off  Parents noted deeper latch and better suckling  Seemed pleased and thanked for session  Encouraged to call for assist while "learning"  Discussed s/s that baby is getting enough milk and some s/s that breastfeeding dyad may need further help  Discussed risks for early supplementation: over feeding, longer digestion times, engorgement for mom, lower milk supply for mom, and nipple confusion  Benefits of breast feeding for infant's intestinal tract, less engorgement for mom, protection from multiple disease processes as infant develops, avoidance of over feeding for infant, less nipple confusion, and increased health benefits for mom  Discussed  availability of Donor milk and benefits  Doctor had encouraged supplement for jaundice  Discussed benifits of limiting supplements      Gave information on common concerns, what to expect the first few weeks after delivery, preparing for other caregivers, and how partners can help  Resources for support also provided  Encouraged parents to call for assistance, questions, and concerns about breastfeeding  Extension provided

## 2021-12-30 NOTE — PROGRESS NOTES
Progress Note - OB/GYN  Lilliana Bal 28 y o  female MRN: [id number]  Unit/Bed#: L&D 312-01 Encounter: [id number]      Lois Ho is a patient of [facility]    Subjective/Objective     Chief Complaint: Postoperative State     Subjective:  Patient is s/p 1LTCS for failed IOL   She is POD# 1  Her pain is well controlled  Patient was oliguric overnight  She received 500cc bolus  Urine is dark  Encouraged po intake and will keep fluids running  Her incision is C/D/I  She is recovering well and is stable       Pain: no  Tolerating Oral Intake: yes  Voiding: mahan in place  Flatus: yes  Bowel Movement: no  Ambulating: no  Breastfeeding: Breastfeeding  Chest Pain: no  Shortness of Breath: no  Leg Pain/Discomfort: no  Lochia: minimal    Vitals:   /61 (BP Location: Left arm)   Pulse 82   Temp 98 1 °F (36 7 °C) (Oral)   Resp 18   Ht 5' 10" (1 778 m)   Wt (!) 139 kg (306 lb)   LMP 03/23/2021 (Exact Date)   SpO2 96%   Breastfeeding Yes   BMI 43 91 kg/m²       Intake/Output Summary (Last 24 hours) at 12/30/2021 0557  Last data filed at 12/30/2021 4265  Gross per 24 hour   Intake 8977 85 ml   Output 2410 ml   Net 6567 85 ml       Invasive Devices  Report    Peripheral Intravenous Line            Peripheral IV 12/27/21 Right;Dorsal (posterior) Forearm 2 days          Drain            Urethral Catheter Non-latex 16 Fr  1 day          Intrauterine Pressure Catheter            Intrauterine Pressure Catheter 12/28/21 1816 1 day                Physical Exam:   GEN: Lois Ho appears well, alert and oriented x 3, pleasant and cooperative   CARDIO: RRR, no murmurs or rubs  RESP:  CTAB, no wheezes or rales  ABDOMEN: soft, no tenderness, no distention, fundus U-1, Incision C/D/I  EXTREMITIES: SCDs on, Negative Lino's sign bilaterally      Labs:   Admission on 12/27/2021   Component Date Value    ABO Grouping 12/27/2021 O     Rh Factor 12/27/2021 Positive     Antibody Screen 12/27/2021 Negative     Specimen Expiration Date 12/27/2021 [id number]     WBC 12/27/2021 10 67*    RBC 12/27/2021 3 66*    Hemoglobin 12/27/2021 11 0*    Hematocrit 12/27/2021 33 0*    MCV 12/27/2021 90     MCH 12/27/2021 30 1     MCHC 12/27/2021 33 3     RDW 12/27/2021 13 9     Platelets 99/25/2916 206     MPV 12/27/2021 10 5     RPR 12/27/2021 Non-Reactive     Amph/Meth UR 12/27/2021 Negative     Barbiturate Ur 12/27/2021 Negative     Benzodiazepine Urine 12/27/2021 Negative     Cocaine Urine 12/27/2021 Negative     Methadone Urine 12/27/2021 Negative     Opiate Urine 12/27/2021 Negative     PCP Ur 12/27/2021 Negative     THC Urine 12/27/2021 Negative     Oxycodone Urine 12/27/2021 Negative     pH, Cord Bill 12/29/2021 7 219     pCO2, Cord Bill 12/29/2021 40 7     pO2, Cord Bill 12/29/2021 23 9     HCO3, Cord Bill 12/29/2021 16 2     Base Exc, Cord Bill 12/29/2021 -10 9*    O2 Cont, Cord Bill 12/29/2021 8 1     O2 HGB,VENOUS CORD 12/29/2021 47 2     pH, Cord Art 12/29/2021 7 070*    pCO2, Cord Art 12/29/2021 65 4*    pO2, Cord Art 12/29/2021 17 4     HCO3, Cord Art 12/29/2021 18 5     Base Exc, Cord Art 12/29/2021 -12 1*    O2 Content, Cord Art 12/29/2021 3 8     O2 Hgb, Arterial Cord 12/29/2021 22 3     WBC 12/30/2021 13 27*    RBC 12/30/2021 3 29*    Hemoglobin 12/30/2021 9 9*    Hematocrit 12/30/2021 30 2*    MCV 12/30/2021 92     MCH 12/30/2021 30 1     MCHC 12/30/2021 32 8     RDW 12/30/2021 14 1     Platelets 39/52/4622 185     MPV 12/30/2021 10 7          Patient Active Problem List   Diagnosis    ADD (attention deficit disorder) without hyperactivity    Generalized anxiety disorder    History of drug use    PCOS (polycystic ovarian syndrome)    Mild episode of recurrent major depressive disorder ([facility] [address] )    39 weeks gestation of pregnancy    Prior pregnancy with placenta abruption in third trimester, antepartum    Maternal obesity syndrome in third trimester    Single umbilical artery affecting management of mother in andrade pregnancy, antepartum    Macrosomia affecting management of mother in third trimester    Status post primary low transverse  section        Assessment and Plan     Sean Dela Cruz is POD# 1 s/p 1LTCS   She is recovering well and is stable     POD# 1   - On Lovenox 40mg BID for DVT prophylaxis    - Continue current medications for pain management    - Encourage ambulation    - Encourage breast feeding     Acute Blood Loss Anemia    -  Preop Hgb 11 0 --> post op Hgb 9 9   - VSS    - Plan for venofer     Oliguria    - Low out put overnight   - s/p 500cc bolus    - will give another 500cc bolus    - keep mahan in until output picks up    - encouraged PO intake and continue IVF     BMI 44   - lovenox 40mg BID for DVT ppx     Anxiety/Depression    - Home Zoloft    Disposition    - Anticipate discharge home on POD# 2-3        Kailey Virgen MD  2021  5:57 AM

## 2021-12-31 VITALS
SYSTOLIC BLOOD PRESSURE: 138 MMHG | RESPIRATION RATE: 18 BRPM | HEIGHT: 70 IN | WEIGHT: 293 LBS | TEMPERATURE: 100.1 F | OXYGEN SATURATION: 97 % | DIASTOLIC BLOOD PRESSURE: 86 MMHG | BODY MASS INDEX: 41.95 KG/M2 | HEART RATE: 84 BPM

## 2021-12-31 PROCEDURE — 99024 POSTOP FOLLOW-UP VISIT: CPT | Performed by: OBSTETRICS & GYNECOLOGY

## 2021-12-31 RX ORDER — ACETAMINOPHEN 325 MG/1
975 TABLET ORAL EVERY 6 HOURS SCHEDULED
Refills: 0
Start: 2021-12-31

## 2021-12-31 RX ORDER — DOCUSATE SODIUM 100 MG/1
100 CAPSULE, LIQUID FILLED ORAL ONCE
Qty: 1 CAPSULE | Refills: 0
Start: 2021-12-31 | End: 2022-01-05

## 2021-12-31 RX ORDER — DOCUSATE SODIUM 100 MG/1
100 CAPSULE, LIQUID FILLED ORAL ONCE
Status: DISCONTINUED | OUTPATIENT
Start: 2021-12-31 | End: 2021-12-31 | Stop reason: HOSPADM

## 2021-12-31 RX ORDER — OXYCODONE HYDROCHLORIDE 5 MG/1
5 TABLET ORAL EVERY 4 HOURS PRN
Qty: 10 TABLET | Refills: 0 | Status: SHIPPED | OUTPATIENT
Start: 2021-12-31 | End: 2022-01-05

## 2021-12-31 RX ORDER — IBUPROFEN 600 MG/1
600 TABLET ORAL EVERY 6 HOURS PRN
Qty: 30 TABLET | Refills: 0
Start: 2021-12-31

## 2021-12-31 RX ADMIN — ACETAMINOPHEN 975 MG: 325 TABLET, FILM COATED ORAL at 15:00

## 2021-12-31 RX ADMIN — ENOXAPARIN SODIUM 40 MG: 40 INJECTION SUBCUTANEOUS at 09:09

## 2021-12-31 RX ADMIN — ACETAMINOPHEN 975 MG: 325 TABLET, FILM COATED ORAL at 09:10

## 2021-12-31 RX ADMIN — ACETAMINOPHEN 975 MG: 325 TABLET, FILM COATED ORAL at 02:22

## 2021-12-31 NOTE — PLAN OF CARE
Problem: PAIN - ADULT  Goal: Verbalizes/displays adequate comfort level or baseline comfort level  Description: Interventions:  - Encourage patient to monitor pain and request assistance  - Assess pain using appropriate pain scale  - Administer analgesics based on type and severity of pain and evaluate response  - Implement non-pharmacological measures as appropriate and evaluate response  - Consider cultural and social influences on pain and pain management  - Notify physician/advanced practitioner if interventions unsuccessful or patient reports new pain  2021 1712 by Edwin Up RN  Outcome: Adequate for Discharge  2021 1134 by Edwin Up RN  Outcome: Progressing     Problem: DISCHARGE PLANNING  Goal: Discharge to home or other facility with appropriate resources  Description: INTERVENTIONS:  - Identify barriers to discharge w/patient and caregiver  - Arrange for needed discharge resources and transportation as appropriate  - Identify discharge learning needs (meds, wound care, etc )  - Arrange for interpretive services to assist at discharge as needed  - Refer to Case Management Department for coordinating discharge planning if the patient needs post-hospital services based on physician/advanced practitioner order or complex needs related to functional status, cognitive ability, or social support system  2021 1712 by Edwin Up RN  Outcome: Adequate for Discharge  2021 1134 by Edwin Up RN  Outcome: Progressing     Problem: POSTPARTUM  Goal: Experiences normal postpartum course  Description: INTERVENTIONS:  - Monitor maternal vital signs  - Assess uterine involution and lochia  2021 1712 by Edwin Up RN  Outcome: Adequate for Discharge  2021 1134 by Edwin Up RN  Outcome: Progressing  Goal: Appropriate maternal -  bonding  Description: INTERVENTIONS:  - Identify family support  - Assess for appropriate maternal/infant bonding   -Encourage maternal/infant bonding opportunities  - Referral to  or  as needed  12/31/2021 1712 by Angelica Larry RN  Outcome: Adequate for Discharge  12/31/2021 1134 by Angelica Larry RN  Outcome: Progressing  Goal: Establishment of infant feeding pattern  Description: INTERVENTIONS:  - Assess breast/bottle feeding  - Refer to lactation as needed  12/31/2021 1712 by Angelica Larry RN  Outcome: Adequate for Discharge  12/31/2021 1134 by Angelica Larry RN  Outcome: Progressing  Goal: Incision(s), wounds(s) or drain site(s) healing without S/S of infection  Description: INTERVENTIONS  - Assess and document dressing, incision, wound bed, drain sites and surrounding tissue  - Provide patient and family education    12/31/2021 1712 by Angelica Larry RN  Outcome: Adequate for Discharge  12/31/2021 1134 by Angelica Larry RN  Outcome: Progressing

## 2021-12-31 NOTE — LACTATION NOTE
This note was copied from a baby's chart  Hand expression, Improvement on alignment, demonstrated use of finger feeding vs SNS feedings, prioritization  Jose Harris been under phototherapy  She was being supplemented with formula  Parents say that donor breast milk was offered to them and that they had declined it  Infant was ready to feed at the breast and swaddle and with her belly facing moms head  Went over hand expression, alignment, and hunger and fullness cues  Parents are desiring to continue with supplementations as phototherapy has ended  They had a feeding tube in the room that they were using for finger feeding  Demonstrated how to use the feeding tube for finger feeding   Then, showed them how to use supplemental nursing system to feed formula at the breast  Encouraged parents to pump at every feeding involving formula to protect her supply and to replace formula with moms own breast milk as supply increases

## 2021-12-31 NOTE — PLAN OF CARE
Problem: BIRTH - VAGINAL/ SECTION  Goal: Fetal and maternal status remain reassuring during the birth process  Description: INTERVENTIONS:  - Monitor vital signs  - Monitor fetal heart rate  - Monitor uterine activity  - Monitor labor progression (vaginal delivery)  - DVT prophylaxis  - Antibiotic prophylaxis  Outcome: Progressing  Goal: Emotionally satisfying birthing experience for mother/fetus  Description: Interventions:  - Assess, plan, implement and evaluate the nursing care given to the patient in labor  - Advocate the philosophy that each childbirth experience is a unique experience and support the family's chosen level of involvement and control during the labor process   - Actively participate in both the patient's and family's teaching of the birth process  - Consider cultural, Rastafari and age-specific factors and plan care for the patient in labor  Outcome: Progressing     Problem: PAIN - ADULT  Goal: Verbalizes/displays adequate comfort level or baseline comfort level  Description: Interventions:  - Encourage patient to monitor pain and request assistance  - Assess pain using appropriate pain scale  - Administer analgesics based on type and severity of pain and evaluate response  - Implement non-pharmacological measures as appropriate and evaluate response  - Consider cultural and social influences on pain and pain management  - Notify physician/advanced practitioner if interventions unsuccessful or patient reports new pain  Outcome: Progressing     Problem: DISCHARGE PLANNING  Goal: Discharge to home or other facility with appropriate resources  Description: INTERVENTIONS:  - Identify barriers to discharge w/patient and caregiver  - Arrange for needed discharge resources and transportation as appropriate  - Identify discharge learning needs (meds, wound care, etc )  - Arrange for interpretive services to assist at discharge as needed  - Refer to Case Management Department for coordinating discharge planning if the patient needs post-hospital services based on physician/advanced practitioner order or complex needs related to functional status, cognitive ability, or social support system  Outcome: Progressing     Problem: POSTPARTUM  Goal: Experiences normal postpartum course  Description: INTERVENTIONS:  - Monitor maternal vital signs  - Assess uterine involution and lochia  Outcome: Progressing  Goal: Appropriate maternal -  bonding  Description: INTERVENTIONS:  - Identify family support  - Assess for appropriate maternal/infant bonding   -Encourage maternal/infant bonding opportunities  - Referral to  or  as needed  Outcome: Progressing  Goal: Establishment of infant feeding pattern  Description: INTERVENTIONS:  - Assess breast/bottle feeding  - Refer to lactation as needed  Outcome: Progressing  Goal: Incision(s), wounds(s) or drain site(s) healing without S/S of infection  Description: INTERVENTIONS  - Assess and document dressing, incision, wound bed, drain sites and surrounding tissue  - Provide patient and family education    Outcome: Progressing

## 2021-12-31 NOTE — PROGRESS NOTES
Progress Note - OB/GYN  Albino Bryantchris MachucaHuseyin 28 y o  female MRN: [id number]  Unit/Bed#: L&D 312-01 Encounter: [id number]      Galilea Borjas is a patient of [facility]    Subjective/Objective     Chief Complaint: Postoperative State     Subjective:  Patient is s/p 1LTCS for failed IOL   She is POD# 2  Her pain is well controlled  She is voiding spontaneously  Her incision is C/D/I  She is recovering well and is stable       Pain: no  Tolerating Oral Intake: yes  Voiding: yes  Flatus: yes  Bowel Movement: no  Ambulating: yes  Breastfeeding: Breastfeeding  Chest Pain: no  Shortness of Breath: no  Leg Pain/Discomfort: no  Lochia: minimal    Vitals:   /72 (BP Location: Left arm)   Pulse 87   Temp 98 2 °F (36 8 °C) (Oral)   Resp 18   Ht 5' 10" (1 778 m)   Wt (!) 139 kg (306 lb)   LMP 03/23/2021 (Exact Date)   SpO2 97%   Breastfeeding Yes   BMI 43 91 kg/m²       Intake/Output Summary (Last 24 hours) at 12/31/2021 4119  Last data filed at 12/31/2021 0201  Gross per 24 hour   Intake --   Output 2765 ml   Net -2765 ml       Invasive Devices  Report    Peripheral Intravenous Line            Peripheral IV 12/27/21 Right;Dorsal (posterior) Forearm 3 days          Intrauterine Pressure Catheter            Intrauterine Pressure Catheter 12/28/21 1816 2 days                Physical Exam:   GEN: Galilea Borjas appears well, alert and oriented x 3, pleasant and cooperative   CARDIO: RRR, no murmurs or rubs  RESP:  CTAB, no wheezes or rales  ABDOMEN: soft, no tenderness, no distention, fundus U-2, Incision C/D/I  EXTREMITIES: SCDs on, Negative Lino's sign bilaterally      Labs:   Admission on 12/27/2021   Component Date Value    ABO Grouping 12/27/2021 O     Rh Factor 12/27/2021 Positive     Antibody Screen 12/27/2021 Negative     Specimen Expiration Date 12/27/2021 [id number]     WBC 12/27/2021 10 67*    RBC 12/27/2021 3 66*    Hemoglobin 12/27/2021 11 0*    Hematocrit 12/27/2021 33 0*    MCV 12/27/2021 90     MCH 2021 30 1     MCHC 2021 33 3     RDW 2021 13 9     Platelets  206     MPV 2021 10 5     RPR 2021 Non-Reactive     Amph/Meth UR 2021 Negative     Barbiturate Ur 2021 Negative     Benzodiazepine Urine 2021 Negative     Cocaine Urine 2021 Negative     Methadone Urine 2021 Negative     Opiate Urine 2021 Negative     PCP Ur 2021 Negative     THC Urine 2021 Negative     Oxycodone Urine 2021 Negative     pH, Cord Bill 2021 7 219     pCO2, Cord Bill 2021 40 7     pO2, Cord Bill 2021 23 9     HCO3, Cord Bill 2021 16 2     Base Exc, Cord Bill 2021 -10 9*    O2 Cont, Cord Bill 2021 8 1     O2 HGB,VENOUS CORD 2021 47 2     pH, Cord Art 2021 7 070*    pCO2, Cord Art 2021 65 4*    pO2, Cord Art 2021 17 4     HCO3, Cord Art 2021 18 5     Base Exc, Cord Art 2021 -12 1*    O2 Content, Cord Art 2021 3 8     O2 Hgb, Arterial Cord 2021 22 3     WBC 2021 13 27*    RBC 2021 3 29*    Hemoglobin 2021 9 9*    Hematocrit 2021 30 2*    MCV 2021 92     MCH 2021 30 1     MCHC 2021 32 8     RDW 2021 14 1     Platelets  185     MPV 2021 10 7          Patient Active Problem List   Diagnosis    ADD (attention deficit disorder) without hyperactivity    Generalized anxiety disorder    History of drug use    PCOS (polycystic ovarian syndrome)    Mild episode of recurrent major depressive disorder ([facility] [address] )    39 weeks gestation of pregnancy    Prior pregnancy with placenta abruption in third trimester, antepartum    Maternal obesity syndrome in third trimester    Single umbilical artery affecting management of mother in andrade pregnancy, antepartum    Macrosomia affecting management of mother in third trimester    Status post primary low transverse  section Assessment and Plan     Heraclio Backbone is POD# 2 s/p 1LTCS   She is recovering well and is stable     POD# 2   - On Lovenox 40mg BID for DVT prophylaxis    - Continue current medications for pain management    - Encourage ambulation    - Encourage breast feeding     Acute Blood Loss Anemia    -  Preop Hgb 11 0 --> post op Hgb 9 9   - VSS     Oliguria    - resolved    BMI 44   - lovenox 40mg BID for DVT ppx     Anxiety/Depression    - Home Zoloft    Disposition    - Anticipate discharge home on POD# Ul  Lopez Shetty MD  12/31/2021  7:02 AM

## 2021-12-31 NOTE — PLAN OF CARE
Problem: PAIN - ADULT  Goal: Verbalizes/displays adequate comfort level or baseline comfort level  Description: Interventions:  - Encourage patient to monitor pain and request assistance  - Assess pain using appropriate pain scale  - Administer analgesics based on type and severity of pain and evaluate response  - Implement non-pharmacological measures as appropriate and evaluate response  - Consider cultural and social influences on pain and pain management  - Notify physician/advanced practitioner if interventions unsuccessful or patient reports new pain  Outcome: Progressing     Problem: DISCHARGE PLANNING  Goal: Discharge to home or other facility with appropriate resources  Description: INTERVENTIONS:  - Identify barriers to discharge w/patient and caregiver  - Arrange for needed discharge resources and transportation as appropriate  - Identify discharge learning needs (meds, wound care, etc )  - Arrange for interpretive services to assist at discharge as needed  - Refer to Case Management Department for coordinating discharge planning if the patient needs post-hospital services based on physician/advanced practitioner order or complex needs related to functional status, cognitive ability, or social support system  Outcome: Progressing     Problem: POSTPARTUM  Goal: Experiences normal postpartum course  Description: INTERVENTIONS:  - Monitor maternal vital signs  - Assess uterine involution and lochia  Outcome: Progressing  Goal: Appropriate maternal -  bonding  Description: INTERVENTIONS:  - Identify family support  - Assess for appropriate maternal/infant bonding   -Encourage maternal/infant bonding opportunities  - Referral to  or  as needed  Outcome: Progressing  Goal: Establishment of infant feeding pattern  Description: INTERVENTIONS:  - Assess breast/bottle feeding  - Refer to lactation as needed  Outcome: Progressing  Goal: Incision(s), wounds(s) or drain site(s) healing without S/S of infection  Description: INTERVENTIONS  - Assess and document dressing, incision, wound bed, drain sites and surrounding tissue  - Provide patient and family education    Outcome: Progressing

## 2022-01-03 NOTE — UTILIZATION REVIEW
Inpatient Admission Authorization Request   Notification of Maternity/Delivery &  Birth Information for Admission   SERVICING FACILITY:   [address] [place] [address], [facility], [facility]  Tax ID: [id number]  NPI: [id number]  Place of Service: Inpatient [address]  Place of Service Code: 24     ATTENDING PROVIDER:  Attending Name and NPI#: Izabela Frank, [address]  Address: [address] [place] [address], [facility], [facility]  Phone: [phone]     UTILIZATION REVIEW CONTACT:  Gardenia Crystal Utilization   Network Utilization Review Department  Phone: [phone]  Fax [phone]  Email: [email or web address]     PHYSICIAN ADVISORY SERVICES:  FOR DCEV-DM-CNBC REVIEW - MEDICAL NECESSITY DENIAL  Phone: [phone]  Fax: [phone]  Email: [email or web address]     TYPE OF REQUEST:  Inpatient Status     ADMISSION INFORMATION:  ADMISSION DATE/TIME: 21  3:01 PM  PATIENT DIAGNOSIS CODE/DESCRIPTION:  Decreased fetal movement [O36 8190] The primary encounter diagnosis was Status post primary low transverse  section  A diagnosis of Maternal obesity syndrome in third trimester was also pertinent to this visit  1  Status post primary low transverse  section    2   Maternal obesity syndrome in third trimester      DISCHARGE DATE/TIME: 2021  6:59 PM  DISCHARGE DISPOSITION (IF DISCHARGED): Home/Self Care     MOTHER AND  INFORMATION:  Mother: Rola Escobedo 1990   Delivering clinician:    OB History        3    Para   1    Term   1       0    AB   2    Living   1       SAB   1    IAB   0    Ectopic   0    Multiple   0    Live Births   1                Name & MRN:   Information for the patient's :  Teo Bal [[id number]]     [place] Delivery Information:  Sex: female  Delivered 2021 2:50 PM by , Low Transverse; Gestational Age: 44w3d    [place] Measurements:  Weight: 8 lb 13 1 oz (4000 g); Height: 21 5"    APGAR 1 minute 5 minutes 10 minutes   Totals: 7 9      [place] Birth Information: 28 y o  female MRN: [id number] Unit/Bed#: L&D 312-01 Estimated Date of Delivery: 21  Birthweight: No birth weight on file  Gestational Age: <None> Delivery Type: , Low Transverse          APGARS  One minute Five minutes Ten minutes   Totals:               IMPORTANT INFORMATION:  Please contact the Marylou Reynoso directly with any questions or concerns regarding this request  Department voicemails are confidential     Send requests for admission clinical reviews, concurrent reviews, approvals, and administrative denials due to lack of clinical to fax [phone]  Initial Clinical Review    Admission: Date/Time/Statement:   Admission Orders (From admission, onward)     Ordered        21 1501  Inpatient Admission  Once                      Orders Placed This Encounter   Procedures    Inpatient Admission     Standing Status:   Standing     Number of Occurrences:   1     Order Specific Question:   Level of Care     Answer:   Med Surg [16]     Order Specific Question:   Estimated length of stay     Answer:   More than 2 Midnights     Order Specific Question:   Certification     Answer:   I certify that inpatient services are medically necessary for this patient for a duration of greater than two midnights  See H&P and MD Progress Notes for additional information about the patient's course of treatment  ED Arrival Information     Patient not seen in ED                     Chief Complaint   Patient presents with    Decreased Fetal Movement     not much movement since last pm     Scheduled Induction     due to oligo       Initial Presentation:  ON  28 y o  [id number]  at 39w6d weeks gestation admit Inpatient for IOL for oligohydramnios   SVE deferred -previously 1cm, checking at time of mahan/cytotec placement, vertex on TAUS  IOL incl  several cytotec, AROM, IV Pit, IUPC w adequate contractions, epidural  C Section delivery due to failed IOL based on adequate contractions with 18 hours of ruptured membranes  Viable Baby girl born  1450 birthwt 4000 g (8 lb 13 1 oz)  , apgars 7&9      6:50 PM first dose of vaginal cytotec placed  Will attempt mahan balloon placement with next check  Contraction Quality: Mild  Tachysystole: No   Cervical Dilation: Fingertip  Date:    Day 2:   4:29 AM Cervical Dilation: 1, placed blindly without incident  Patient to receive third dose of cytotec orally  1:41 PM Oxytocin: 12 vel-units/min Cervical Dilation: 4  AROM attempted but unclear and disruption of membranes was palpable on digital exam although minimal fluid was noted vaginally afterwards  2021 2:46 PM epidural      12:33 AM Oxytocin: 24 vel-units/min   Cervical Dilation: 5   9:07 AM meets criteria for failed IOL based on adequate contractions with 18 hours of ruptured membranes  Cervical Dilation: 5  Dose (vel-units/min) Oxytocin: 0 vel-units/min Contraction Frequency (minutes): 2-3  She is agreeable to  delivery   SURGERY DATE: 2021 1450  Procedure(s) (LRB):   SECTION () (N/A)   1450 Viable baby Girl , birthwt 4000 g (8 lb 13 1 oz)  , apgars 7&9     Triage Vitals   Temperature Pulse Respirations Blood Pressure SpO2   21 1400 21 1400 21 1715 21 1400 21 1433   98 1 °F (36 7 °C) 96 16 137/82 96 %      Temp Source Heart Rate Source Patient Position - Orthostatic VS BP Location FiO2 (%)   21 1400 21 1715 21 1400 21 1400 --   Axillary Monitor Sitting Left arm       Pain Score       21 1400       No Pain          Wt Readings from Last 1 Encounters:   21 (!) 139 kg (306 lb)     Additional Vital Signs:   Date/Time Temp Pulse Resp BP SpO2 O2 Device Cardiac (WDL) Patient Position - Orthostatic VS 12/29/21 1545 -- 85 16 110/59 100 % None (Room air) WDL Lying   12/29/21 1535 99 4 °F (37 4 °C) 83 16 120/70 100 % None (Room air) WDL Lying     12/28/21 0725 97 7 °F (36 5 °C) -- -- -- -- -- -- --   12/28/21 0700 -- 74 -- 123/59 -- -- -- --   12/28/21 0558 -- 80 -- 139/77 -- -- -- --   12/28/21 0437 98 °F (36 7 °C) -- -- -- -- -- -- --     12/27/21 1715 98 2 °F (36 8 °C) 96 16 124/72 -- -- -- Lying   12/27/21 1400 98 1 °F (36 7 °C) 96 -- 137/82 -- -- -- Sitting       Weights (last 14 days)    Date/Time Weight Height   12/27/21 1715 139 kg (306 lb) Abnormal  5' 10" (1 778 m)   12/27/21 1400 139 kg (306 lb) Abnormal  5' 10" (1 778 m)           Pertinent Labs/Diagnostic Test Results:       Results from last 7 days   Lab Units 12/30/21  0458 12/27/21  1747   WBC Thousand/uL 13 27* 10 67*   HEMOGLOBIN g/dL 9 9* 11 0*   HEMATOCRIT % 30 2* 33 0*   PLATELETS Thousands/uL 185 206               Results from last 7 days   Lab Units 12/27/21  1731   AMPH/METH  Negative   BARBITURATE UR  Negative   BENZODIAZEPINE UR  Negative   COCAINE UR  Negative   METHADONE URINE  Negative   OPIATE UR  Negative   PCP UR  Negative   THC UR  Negative     ED Treatment:   Medication Administration - No Administrations Displayed (No Start Event Found)     None        Past Medical History:   Diagnosis Date    Alcohol abuse     in past    Anxiety     Arthritis     bilateral hips    Cyst of breast, left     Depression     Female infertility     Lump of left breast     LAST ASSESSED: 4/13/17    PCOS (polycystic ovarian syndrome)     Polycystic ovary syndrome     Substance abuse (HCC)     Marijuana use in past    Varicella      Present on Admission:   Generalized anxiety disorder   History of drug use   Mild episode of recurrent major depressive disorder ([address] )   Maternal obesity syndrome in third trimester      Admitting Diagnosis: Decreased fetal movement [O36 8190]  Age/Sex: 28 y o  female  Admission Orders:  continuous external uterine contraction & fetal HR monitoring  Scheduled Medications:  loratadine, 10 mg, Oral, Daily  morphine, , ,   penicillin G, 2 5 Million Units, Intravenous, Q4H  sertraline, 25 mg, Oral, Daily    Continuous IV Infusions:  lactated ringers, 125 mL/hr, Intravenous, Continuous  oxytocin, 1-30 vel-units/min, Intravenous, Titrated  ropivacaine 0 2%, , Epidural, Continuous      PRN Meds:  acetaminophen, 650 mg, Oral, Q6H PRN  doxylamine, 25 mg, Oral, HS PRN  ondansetron, 4 mg, Intravenous, Q6H PRN  None    Network Utilization Review Department  ATTENTION: Please call with any questions or concerns to [phone] and carefully listen to the prompts so that you are directed to the right person  All voicemails are confidential   Gerald Chamorro all requests for admission clinical reviews, approved or denied determinations and any other requests to dedicated fax number below belonging to the campus where the patient is receiving treatment  List of dedicated fax numbers for the Facilities:  [address] DENIALS (Administrative/Medical Necessity) [phone]   [address] (Maternity/NICU/Pediatrics) [phone]   [address] [place] [address] [address] [place] [address] [address] [place] [address] [address] [facility] [place] [address] [phone]       Notification of Discharge   This is a Notification of Discharge from our facility [address]   Please be advised that this patient has been discharge from our facility  Below you will find the admission and discharge date and time including the patients disposition  UTILIZATION REVIEW CONTACT:  Gardenia Crystal  Utilization   Network Utilization Review Department  Phone: [phone] x carefully listen to the prompts  All voicemails are confidential   Email: [email or web address]     PHYSICIAN ADVISORY SERVICES:  FOR SGXH-CY-MTOM REVIEW - MEDICAL NECESSITY DENIAL  Phone: [phone]  Fax: [phone]  Email: [email or web address]     PRESENTATION DATE: 12/27/2021  1:35 PM  OBERVATION ADMISSION DATE:   INPATIENT ADMISSION DATE: 12/27/21  3:01 PM   DISCHARGE DATE: 12/31/2021  6:59 PM  DISPOSITION: Home/Self Care Home/Self Care      IMPORTANT INFORMATION:  Send all requests for admission clinical reviews, approved or denied determinations and any other requests to dedicated fax number below belonging to the campus where the patient is receiving treatment   List of dedicated fax numbers:  [address] DENIALS (Administrative/Medical Necessity) [phone]   [address] (Maternity/NICU/Pediatrics) [phone]   Gisele Flatten [phone]   Efren Lancaster [phone]   Rahda Lowe [phone]   [facility] [address] [place] [address] [phone]   [facility]  [phone]   [address] [facility] [address]  2401 [facility] 1000 [address] [facility] [phone]

## 2022-01-05 ENCOUNTER — OFFICE VISIT (OUTPATIENT)
Dept: OBGYN CLINIC | Facility: CLINIC | Age: 32
End: 2022-01-05

## 2022-01-05 VITALS
BODY MASS INDEX: 41.95 KG/M2 | DIASTOLIC BLOOD PRESSURE: 70 MMHG | SYSTOLIC BLOOD PRESSURE: 120 MMHG | HEIGHT: 70 IN | WEIGHT: 293 LBS

## 2022-01-05 DIAGNOSIS — Z98.891 STATUS POST PRIMARY LOW TRANSVERSE CESAREAN SECTION: ICD-10-CM

## 2022-01-05 PROCEDURE — 99024 POSTOP FOLLOW-UP VISIT: CPT | Performed by: STUDENT IN AN ORGANIZED HEALTH CARE EDUCATION/TRAINING PROGRAM

## 2022-01-05 PROCEDURE — 3008F BODY MASS INDEX DOCD: CPT | Performed by: OBSTETRICS & GYNECOLOGY

## 2022-01-05 NOTE — PATIENT INSTRUCTIONS
Dear Galo,    It was a pleasure taking care of you today  I appreciate you and welcome your feedback  If you receive a survey from Nevin Nielsen, please take a few moments to let me know how I am doing  Sincerely,  Dr Chapis Warner  [phone]        Postpartum [facility]:  Baby and Me is a support center designed to promote the physical and mental health of families  [address] delivers personalized and compassionate care that includes prenatal and  classes, Support Groups, Lactation Support Services, Post-Partum Emotional Wellness Services, Activities/Exercise    [address]  [address] [place] [address], [address] [facility]  [phone]  [email or web address]    [address] [facility] for Pelvic Health  Physical Therapy at [facility] team of trained female therapists offer a wide variety of services designed to address the special healthcare needs of women  Our specially trained clinicians work with you to address your concerns and come beside you to support and encourage you through the healing process  Our offices are designed to keep your sensitive treatments private at all times  We are here to ensure your comfort and mentor you through our pelvic floor therapy programs at your pace  Talita vidal    Other Resources for Postpartum [address]  [facility]

## 2022-01-05 NOTE — PROGRESS NOTES
[facility] [address], [place], [place]    Assessment/Plan:  Danni Hope is a 28 y o  [id number] who presents for routine postoperative incision check 1 week s/p  delivery  Status post primary low transverse  section  - Incision is clean, dry, and intact  Well healing without erythema   - Depression Screen: Negative  - Feeding: Breastfeeding/pumping exclusively, going well  - Psychosocial support: Reports adequate support  - Patient Education: Postpartum resources including Pelvic Health PT and Baby & Me    Diagnoses and all orders for this visit:    Encounter for postpartum care and examination after delivery    Status post primary low transverse  section          Subjective:   Danni Hope is a 28 y o   female  CC: Routine incision check    HPI: Lulu Lance is a 28 y o  now [id number] who presents for incision check after primary  delivery for failed induction on 2021  She delivered a healthy baby girl  She is doing well and has no concerns  She is exclusively breastfeeding  Mood is stable  ROS: Review of Systems   Constitutional: Negative for chills and fever  Respiratory: Negative for cough and shortness of breath  Cardiovascular: Negative for chest pain and leg swelling  Gastrointestinal: Negative for abdominal pain, nausea and vomiting  Genitourinary: Negative for dysuria, frequency and urgency  Neurological: Negative for dizziness, light-headedness and headaches         PFSH: The following portions of the patient's history were reviewed and updated as appropriate: allergies, current medications, past family history, past medical history, obstetric history, gynecologic history, past social history, past surgical history and problem list        Objective:  /70   Ht 5' 10" (1 778 m)   Wt (!) 137 kg (301 lb 6 4 oz)   LMP 2021 (Exact Date)   Breastfeeding No   BMI 43 25 kg/m²    Physical Exam  Constitutional:       Appearance: Normal appearance  HENT:      Head: Normocephalic and atraumatic  Mouth/Throat:      Mouth: Mucous membranes are moist       Pharynx: Oropharynx is clear  No oropharyngeal exudate  Cardiovascular:      Rate and Rhythm: Normal rate  Pulmonary:      Effort: Pulmonary effort is normal    Abdominal:      General: Abdomen is flat  There is no distension  Palpations: Abdomen is soft  There is no mass  Tenderness: There is no abdominal tenderness  Hernia: No hernia is present  Comments: Incision is clean, dry, intact, and well healing  There is no erythema, induration, fluctuance, or drainage  Skin:     General: Skin is warm and dry  Neurological:      General: No focal deficit present  Mental Status: She is alert and oriented to person, place, and time     Psychiatric:         Mood and Affect: Mood normal          Behavior: Behavior normal        Bill Juárez MD  [address] [place] [address]  1/5/2022 11:03 AM

## 2022-01-05 NOTE — ASSESSMENT & PLAN NOTE
- Incision is clean, dry, and intact  Well healing without erythema   - Depression Screen: Negative  - Feeding: Breastfeeding/pumping exclusively, going well  - Psychosocial support: Reports adequate support    - Patient Education: Postpartum resources including Pelvic Health PT and Baby & Me

## 2022-02-08 ENCOUNTER — POSTPARTUM VISIT (OUTPATIENT)
Dept: OBGYN CLINIC | Facility: MEDICAL CENTER | Age: 32
End: 2022-02-08

## 2022-02-08 VITALS
WEIGHT: 207 LBS | BODY MASS INDEX: 29.63 KG/M2 | DIASTOLIC BLOOD PRESSURE: 80 MMHG | SYSTOLIC BLOOD PRESSURE: 130 MMHG | HEIGHT: 70 IN

## 2022-02-08 DIAGNOSIS — Z98.891 STATUS POST PRIMARY LOW TRANSVERSE CESAREAN SECTION: ICD-10-CM

## 2022-02-08 DIAGNOSIS — Z30.41 SURVEILLANCE FOR BIRTH CONTROL, ORAL CONTRACEPTIVES: Primary | ICD-10-CM

## 2022-02-08 PROCEDURE — 99024 POSTOP FOLLOW-UP VISIT: CPT | Performed by: STUDENT IN AN ORGANIZED HEALTH CARE EDUCATION/TRAINING PROGRAM

## 2022-02-08 RX ORDER — ACETAMINOPHEN AND CODEINE PHOSPHATE 120; 12 MG/5ML; MG/5ML
1 SOLUTION ORAL DAILY
Qty: 84 TABLET | Refills: 3 | Status: SHIPPED | OUTPATIENT
Start: 2022-02-08 | End: 2023-02-08

## 2022-02-09 NOTE — PROGRESS NOTES
[facility] [address] [place] [address]    Assessment/Plan:  Andrea Beltran is a 28 y o  [id number] who presents for postpartum visit  Status post primary low transverse  section  - Incision is well healing without erythema   - Depression Screen: Negative, reporting some intrusive thoughts related to prior OCD but is coping well and is on Zoloft 25 mg     - Feeding: Breastfeeding/pumping exclusively, going well  - Psychosocial support: Reports adequate support  - Patient Education: Postpartum resources including Pelvic Health PT and Baby & Me  - Birth control: Minipill, considering Mirena IUD which we discussed in detail today, will call if interested  Diagnoses and all orders for this visit:    Surveillance for birth control, oral contraceptives  -     norethindrone (Ortho Micronor) 0 35 MG tablet; Take 1 tablet (0 35 mg total) by mouth daily    Status post primary low transverse  section          Subjective:   Andrea Beltran is a 28 y o   female  CC: postpartum    HPI: Ro Parker is a 28 y o  now [id number] who presents for incision check after primary  delivery for failed induction on 2021  She delivered a healthy baby girl  She is doing well and has no concerns  She is exclusively breastfeeding  Mood is stable, althoguh some occasional intrusive thoughts related to known OCD  She is well maintained on Zoloft  ROS: Review of Systems   Constitutional: Negative for chills and fever  Respiratory: Negative for cough and shortness of breath  Cardiovascular: Negative for chest pain and leg swelling  Gastrointestinal: Negative for abdominal pain, nausea and vomiting  Genitourinary: Negative for dysuria, frequency and urgency  Neurological: Negative for dizziness, light-headedness and headaches         PFSH: The following portions of the patient's history were reviewed and updated as appropriate: allergies, current medications, past family history, past medical history, obstetric history, gynecologic history, past social history, past surgical history and problem list        Objective:  /80   Ht 5' 10" (1 778 m)   Wt 93 9 kg (207 lb)   LMP 03/23/2021 (Exact Date)   BMI 29 70 kg/m²    Physical Exam  Constitutional:       Appearance: Normal appearance  HENT:      Head: Normocephalic and atraumatic  Mouth/Throat:      Mouth: Mucous membranes are moist       Pharynx: Oropharynx is clear  No oropharyngeal exudate  Cardiovascular:      Rate and Rhythm: Normal rate  Pulmonary:      Effort: Pulmonary effort is normal    Abdominal:      General: Abdomen is flat  There is no distension  Palpations: Abdomen is soft  There is no mass  Tenderness: There is no abdominal tenderness  Hernia: No hernia is present  Comments: Incision is clean, dry, intact, and well healing  There is no erythema, induration, fluctuance, or drainage  Skin:     General: Skin is warm and dry  Neurological:      General: No focal deficit present  Mental Status: She is alert and oriented to person, place, and time     Psychiatric:         Mood and Affect: Mood normal          Behavior: Behavior normal            Lise Lloyd MD  [address] [place] [address]  2/8/2022 7:04 PM

## 2022-02-09 NOTE — ASSESSMENT & PLAN NOTE
- Incision is well healing without erythema   - Depression Screen: Negative, reporting some intrusive thoughts related to prior OCD but is coping well and is on Zoloft 25 mg     - Feeding: Breastfeeding/pumping exclusively, going well  - Psychosocial support: Reports adequate support  - Patient Education: Postpartum resources including Pelvic Health PT and Baby & Me  - Birth control: Minipill, considering Mirena IUD which we discussed in detail today, will call if interested

## 2022-07-02 ENCOUNTER — OFFICE VISIT (OUTPATIENT)
Dept: URGENT CARE | Facility: CLINIC | Age: 32
End: 2022-07-02
Payer: COMMERCIAL

## 2022-07-02 DIAGNOSIS — J02.9 SORE THROAT: Primary | ICD-10-CM

## 2022-07-02 LAB — S PYO AG THROAT QL: NEGATIVE

## 2022-07-02 PROCEDURE — 87880 STREP A ASSAY W/OPTIC: CPT | Performed by: PREVENTIVE MEDICINE

## 2022-07-02 PROCEDURE — 99213 OFFICE O/P EST LOW 20 MIN: CPT | Performed by: PREVENTIVE MEDICINE

## 2022-07-02 NOTE — PROGRESS NOTES
[facility]        NAME: Shama Marcelo is a 33 y o  female  : 1990    MRN: [id number]  DATE: 2022  TIME: 9:50 AM    Assessment and Plan   Sore throat [J02 9]  1  Sore throat  POCT rapid strepA         Patient Instructions       Follow up with PCP in 3-5 days  Proceed to  ER if symptoms worsen  Chief Complaint     Chief Complaint   Patient presents with    Sore Throat     Pt C/O of a sore throat starting 4 days ago, home covid test results were negative  History of Present Illness       Sore throat x2 days  Denies fever  Denies cough or shortness of breath  Negative phone call with test       Review of Systems   Review of Systems   Constitutional: Negative for fatigue and fever  HENT: Positive for sore throat  Respiratory: Negative for cough and shortness of breath            Current Medications       Current Outpatient Medications:     acetaminophen (TYLENOL) 325 mg tablet, Take 3 tablets (975 mg total) by mouth every 6 (six) hours, Disp: , Rfl: 0    cholecalciferol (VITAMIN D3) 400 units tablet, Take 400 Units by mouth daily, Disp: , Rfl:     ibuprofen (MOTRIN) 600 mg tablet, Take 1 tablet (600 mg total) by mouth every 6 (six) hours as needed for moderate pain, Disp: 30 tablet, Rfl: 0    loratadine (CLARITIN) 10 mg tablet, Take 10 mg by mouth daily, Disp: , Rfl:     Magnesium 250 MG TABS, Take 1 tablet by mouth daily, Disp: , Rfl:     norethindrone (Ortho Micronor) 0 35 MG tablet, Take 1 tablet (0 35 mg total) by mouth daily, Disp: 84 tablet, Rfl: 3    Prenatal MV & Min w/FA-DHA (PRENATAL ADULT GUMMY/DHA/FA PO), Take by mouth, Disp: , Rfl:     sertraline (ZOLOFT) 25 mg tablet, TAKE 1 TABLET BY MOUTH EVERY DAY, Disp: 90 tablet, Rfl: 1    Current Allergies     Allergies as of 2022    (No Known Allergies)            The following portions of the patient's history were reviewed and updated as appropriate: allergies, current medications, past family history, past medical history, past social history, past surgical history and problem list      Past Medical History:   Diagnosis Date    Alcohol abuse     in past    Anxiety     Arthritis     bilateral hips    Cyst of breast, left     Depression     Female infertility     Lump of left breast     LAST ASSESSED: 17    PCOS (polycystic ovarian syndrome)     Polycystic ovary syndrome     Substance abuse ([address] )     Marijuana use in past    Varicella        Past Surgical History:   Procedure Laterality Date    DILATION AND EVACUATION  2019    UT  DELIVERY ONLY N/A 2021    Procedure:  SECTION (); Surgeon: Keren Jimenez MD;  Location: [facility];  Service: Obstetrics    UT INDUCED ABORTN BY DIL/EVAC N/A 2019    Procedure: DILATATION AND EVACUATION (D&E) 15 Weeks;  Surgeon: Natty Bautista MD;  Location:  MAIN OR;  Service: Gynecology    TONSILLECTOMY         Family History   Problem Relation Age of Onset    No Known Problems Mother     Substance Abuse Father         father  in  [place])    Alcohol abuse Father     Coronary artery disease Paternal Grandfather     Arthritis Paternal Grandfather     Alcohol abuse Other     Depression Other     Substance Abuse Other     Arthritis Family     No Known Problems Sister     No Known Problems Brother     No Known Problems Maternal Grandmother     Thrombophlebitis Paternal Grandmother     Breast cancer Neg Hx     Colon cancer Neg Hx     Ovarian cancer Neg Hx          Medications have been verified  Objective   /80   Pulse 86   Temp 98 6 °F (37 °C)   Resp 20   Ht 5' 10" (1 778 m)   Wt 93 9 kg (207 lb)   SpO2 99%   Breastfeeding Yes   BMI 29 70 kg/m²   No LMP recorded         Physical Exam     Physical Exam  HENT:      Right Ear: Tympanic membrane normal       Left Ear: Tympanic membrane normal       Mouth/Throat:      Mouth: Mucous membranes are moist  No oral lesions  Pharynx: Oropharynx is clear  Posterior oropharyngeal erythema present  No oropharyngeal exudate  Lymphadenopathy:      Cervical: No cervical adenopathy       Rapid strep negative at 5 minute

## 2022-07-04 VITALS
TEMPERATURE: 98.6 F | SYSTOLIC BLOOD PRESSURE: 122 MMHG | OXYGEN SATURATION: 99 % | BODY MASS INDEX: 29.63 KG/M2 | WEIGHT: 207 LBS | HEIGHT: 70 IN | HEART RATE: 86 BPM | RESPIRATION RATE: 20 BRPM | DIASTOLIC BLOOD PRESSURE: 80 MMHG

## 2022-07-31 ENCOUNTER — NURSE TRIAGE (OUTPATIENT)
Dept: OTHER | Facility: OTHER | Age: 32
End: 2022-07-31

## 2022-07-31 NOTE — TELEPHONE ENCOUNTER
Reason for Disposition   [1] COVID-19 diagnosed by positive lab test (e g , PCR, rapid self-test kit) AND [2] mild symptoms (e g , cough, fever, others) AND [0] no complications or SOB    Answer Assessment - Initial Assessment Questions  Were you within 6 feet or less, for up to 15 minutes or more with a person that has a confirmed COVID-19 test?   Yes    What was the date of your exposure?  tested positive last week    Are you experiencing any symptoms attributed to the virus?  (Assess for SOB, cough, fever, difficulty breathing)   Yes, fever, sore throat, fatigue    HIGH RISK: Do you have any history heart or lung conditions, weakened immune system, diabetes, Asthma, CHF, HIV, COPD, Chemo, renal failure, sickle cell, etc?   Denies    PREGNANCY: Are you pregnant or did you recently give birth?    Denies    VACCINE: "Have you gotten the COVID-19 vaccine?" If Yes ask: "Which one, how many shots, when did you get it?"   yes    Protocols used: CORONAVIRUS (COVID-19) DIAGNOSED OR SUSPECTED-[facility]

## 2022-07-31 NOTE — TELEPHONE ENCOUNTER
Regarding: Covid care advise  ----- Message from Artemio Hannon sent at 7/31/2022 11:03 AM EDT -----  "I have a fever, sore throat, and fatigued    I tested positive on a covid home test   My  tested positive 5 days ago "

## 2022-08-23 ENCOUNTER — OFFICE VISIT (OUTPATIENT)
Dept: FAMILY MEDICINE CLINIC | Facility: HOSPITAL | Age: 32
End: 2022-08-23
Payer: COMMERCIAL

## 2022-08-23 VITALS
TEMPERATURE: 98 F | DIASTOLIC BLOOD PRESSURE: 74 MMHG | OXYGEN SATURATION: 98 % | HEIGHT: 70 IN | WEIGHT: 293 LBS | HEART RATE: 90 BPM | BODY MASS INDEX: 41.95 KG/M2 | SYSTOLIC BLOOD PRESSURE: 126 MMHG

## 2022-08-23 DIAGNOSIS — F33.0 MILD EPISODE OF RECURRENT MAJOR DEPRESSIVE DISORDER (HCC): Primary | ICD-10-CM

## 2022-08-23 DIAGNOSIS — F98.8 ADD (ATTENTION DEFICIT DISORDER) WITHOUT HYPERACTIVITY: ICD-10-CM

## 2022-08-23 DIAGNOSIS — F41.1 GENERALIZED ANXIETY DISORDER: ICD-10-CM

## 2022-08-23 PROBLEM — O09.293 PRIOR PREGNANCY WITH PLACENTA ABRUPTION IN THIRD TRIMESTER, ANTEPARTUM: Status: RESOLVED | Noted: 2021-08-18 | Resolved: 2022-08-23

## 2022-08-23 PROBLEM — Z98.891 STATUS POST PRIMARY LOW TRANSVERSE CESAREAN SECTION: Status: RESOLVED | Noted: 2021-12-29 | Resolved: 2022-08-23

## 2022-08-23 PROBLEM — O99.213 MATERNAL OBESITY SYNDROME IN THIRD TRIMESTER: Status: RESOLVED | Noted: 2021-08-18 | Resolved: 2022-08-23

## 2022-08-23 PROBLEM — Z3A.39 39 WEEKS GESTATION OF PREGNANCY: Status: RESOLVED | Noted: 2021-07-22 | Resolved: 2022-08-23

## 2022-08-23 PROBLEM — O36.63X0 MACROSOMIA AFFECTING MANAGEMENT OF MOTHER IN THIRD TRIMESTER: Status: RESOLVED | Noted: 2021-11-26 | Resolved: 2022-08-23

## 2022-08-23 PROBLEM — O09.899 SINGLE UMBILICAL ARTERY AFFECTING MANAGEMENT OF MOTHER IN SINGLETON PREGNANCY, ANTEPARTUM: Status: RESOLVED | Noted: 2021-08-31 | Resolved: 2022-08-23

## 2022-08-23 PROCEDURE — 3725F SCREEN DEPRESSION PERFORMED: CPT | Performed by: NURSE PRACTITIONER

## 2022-08-23 PROCEDURE — 99214 OFFICE O/P EST MOD 30 MIN: CPT | Performed by: NURSE PRACTITIONER

## 2022-08-23 NOTE — ASSESSMENT & PLAN NOTE
RUDOLPH-7=5  Increased anxiety may be r/t uncontrolled ADD symptoms  Plan to restart Vyvanse and f/u in 4 weeks  If minimal to no improvement in anxiety symptoms will adjust sertraline dosage

## 2022-08-23 NOTE — ASSESSMENT & PLAN NOTE
Increased anxiety may be from uncontrolled ADD symptoms  She did very well on Vyvanse 60 mg previously  She would like to go back on that dose  Currently she is pumping and supplementing breast milk mixed with formula  She will wean  Aware she cannot breastfeed and take this medication  F/U in 4 weeks

## 2022-08-23 NOTE — PROGRESS NOTES
Assessment/Plan:    ADD (attention deficit disorder) without hyperactivity  Increased anxiety may be from uncontrolled ADD symptoms  She did very well on Vyvanse 60 mg previously  She would like to go back on that dose  Currently she is pumping and supplementing breast milk mixed with formula  She will wean  Aware she cannot breastfeed and take this medication  F/U in 4 weeks  Generalized anxiety disorder  RUDOLPH-7=5  Increased anxiety may be r/t uncontrolled ADD symptoms  Plan to restart Vyvanse and f/u in 4 weeks  If minimal to no improvement in anxiety symptoms will adjust sertraline dosage  Mild episode of recurrent major depressive disorder ([facility])  PHQ-9=4  Overall she feels her depression is well controlled on sertraline  No change to this dose  Diagnoses and all orders for this visit:    Mild episode of recurrent major depressive disorder ([facility] [address] )    Generalized anxiety disorder    ADD (attention deficit disorder) without hyperactivity  -     lisdexamfetamine (Vyvanse) 60 MG capsule; Take 1 capsule (60 mg total) by mouth every morning Max Daily Amount: 60 mg          Subjective:      Patient ID: Jazz Eng is a 33 y o  female  Gave birth 7 months ago  Baby girl  Mood has been good  Increased anxiety  Went back to work full time  Working from home  Struggling with ADD  Starts tasks but not completing them  Has been weaning from breastfeeding and plans to stop  Feels ADHD symptoms are causing her anxiety because she can't get work done  Does not really feel depression is an issue  The following portions of the patient's history were reviewed and updated as appropriate: allergies, current medications, past family history, past medical history, past social history, past surgical history and problem list     Review of Systems   Constitutional: Positive for appetite change and fatigue     Psychiatric/Behavioral: Positive for decreased concentration and sleep disturbance  Negative for agitation, dysphoric mood and suicidal ideas  The patient is nervous/anxious  Objective:  Vitals:    08/23/22 0949   BP: 126/74   Pulse: 90   Temp: 98 °F (36 7 °C)   SpO2: 98%      Physical Exam  Vitals reviewed  Constitutional:       Appearance: Normal appearance  She is obese  Cardiovascular:      Rate and Rhythm: Normal rate and regular rhythm  Heart sounds: Normal heart sounds  No murmur heard  Pulmonary:      Effort: Pulmonary effort is normal       Breath sounds: Normal breath sounds  Skin:     General: Skin is warm and dry  Neurological:      Mental Status: She is alert and oriented to person, place, and time  Psychiatric:         Mood and Affect: Mood normal          Behavior: Behavior normal          Thought Content:  Thought content normal          Judgment: Judgment normal

## 2022-09-02 ENCOUNTER — TELEPHONE (OUTPATIENT)
Dept: OBGYN CLINIC | Facility: MEDICAL CENTER | Age: 32
End: 2022-09-02

## 2022-09-02 NOTE — TELEPHONE ENCOUNTER
Jefry called about having some concerns about her medication she taking [facility]o [facility] she has been taking it for a couple months now and has been experiencing break through bleeding and cramping  Patient would like to speak to someone about her concerns  Please review when you get a chance   Thank you

## 2022-09-02 NOTE — TELEPHONE ENCOUNTER
Pt has been taking for around 6 months and has just recently stopped breast feeding in the last 2 weeks  Pt aware its not uncommon to have break through bleeding within the first few months of birth control and especially with just ending breast feeding  Denies filling a pad  Aware to watch for flow and that it should hopefully start becoming more consistent   Agreeable and will call with any questions or concerns

## 2022-09-22 DIAGNOSIS — F98.8 ADD (ATTENTION DEFICIT DISORDER) WITHOUT HYPERACTIVITY: ICD-10-CM

## 2022-09-23 ENCOUNTER — TELEPHONE (OUTPATIENT)
Dept: FAMILY MEDICINE CLINIC | Facility: HOSPITAL | Age: 32
End: 2022-09-23

## 2022-09-23 DIAGNOSIS — F98.8 ADD (ATTENTION DEFICIT DISORDER) WITHOUT HYPERACTIVITY: ICD-10-CM

## 2022-09-23 NOTE — TELEPHONE ENCOUNTER
Vivance 60 mg was called into [facility]  They do not have medication  The only [facility] pharmacy that has this in stock is the [facility] in [place] (now in chart)  Could you please resend script to this new location?

## 2022-09-23 NOTE — TELEPHONE ENCOUNTER
Due to patient's need, NOY recommended I forward this to Dr Angel Richardson to ask that this be filled at the [facility] as medication is out of stock in [place]  (controlled substance)

## 2022-10-20 DIAGNOSIS — F98.8 ADD (ATTENTION DEFICIT DISORDER) WITHOUT HYPERACTIVITY: ICD-10-CM

## 2022-10-27 DIAGNOSIS — F41.1 GENERALIZED ANXIETY DISORDER: ICD-10-CM

## 2022-10-27 RX ORDER — SERTRALINE HYDROCHLORIDE 25 MG/1
TABLET, FILM COATED ORAL
Qty: 30 TABLET | Refills: 2 | Status: SHIPPED | OUTPATIENT
Start: 2022-10-27

## 2022-11-22 DIAGNOSIS — F98.8 ADD (ATTENTION DEFICIT DISORDER) WITHOUT HYPERACTIVITY: ICD-10-CM

## 2022-11-25 DIAGNOSIS — F41.1 GENERALIZED ANXIETY DISORDER: ICD-10-CM

## 2022-11-25 RX ORDER — SERTRALINE HYDROCHLORIDE 25 MG/1
TABLET, FILM COATED ORAL
Qty: 90 TABLET | Refills: 1 | Status: SHIPPED | OUTPATIENT
Start: 2022-11-25

## 2022-12-07 ENCOUNTER — OFFICE VISIT (OUTPATIENT)
Dept: FAMILY MEDICINE CLINIC | Facility: HOSPITAL | Age: 32
End: 2022-12-07

## 2022-12-07 VITALS
HEIGHT: 70 IN | HEART RATE: 86 BPM | OXYGEN SATURATION: 99 % | SYSTOLIC BLOOD PRESSURE: 124 MMHG | DIASTOLIC BLOOD PRESSURE: 75 MMHG | BODY MASS INDEX: 39.28 KG/M2 | TEMPERATURE: 97.9 F | WEIGHT: 274.4 LBS

## 2022-12-07 DIAGNOSIS — Z13.0 SCREENING FOR ENDOCRINE, METABOLIC AND IMMUNITY DISORDER: ICD-10-CM

## 2022-12-07 DIAGNOSIS — F98.8 ADD (ATTENTION DEFICIT DISORDER) WITHOUT HYPERACTIVITY: ICD-10-CM

## 2022-12-07 DIAGNOSIS — F41.1 GENERALIZED ANXIETY DISORDER: ICD-10-CM

## 2022-12-07 DIAGNOSIS — Z13.220 SCREENING CHOLESTEROL LEVEL: ICD-10-CM

## 2022-12-07 DIAGNOSIS — Z13.29 SCREENING FOR ENDOCRINE, METABOLIC AND IMMUNITY DISORDER: ICD-10-CM

## 2022-12-07 DIAGNOSIS — F33.0 MILD EPISODE OF RECURRENT MAJOR DEPRESSIVE DISORDER (HCC): Primary | ICD-10-CM

## 2022-12-07 DIAGNOSIS — Z00.00 ANNUAL PHYSICAL EXAM: ICD-10-CM

## 2022-12-07 DIAGNOSIS — Z13.228 SCREENING FOR ENDOCRINE, METABOLIC AND IMMUNITY DISORDER: ICD-10-CM

## 2022-12-07 NOTE — ASSESSMENT & PLAN NOTE
RUDOLPH-7=6  Increase in anxiety and intrusive thoughts since starting Vyvanse again  Will increase sertraline to 50 mg as this is what she was on previously  F/U in 4 weeks

## 2022-12-07 NOTE — PATIENT INSTRUCTIONS
Wellness Visit for Adults   AMBULATORY CARE:   A wellness visit  is when you see your healthcare provider to get screened for health problems  Your healthcare provider will also give you advice on how to stay healthy  Write down your questions so you remember to ask them  Ask your healthcare provider how often you should have a wellness visit  What happens at a wellness visit:  Your healthcare provider will ask about your health, and your family history of health problems  This includes high blood pressure, heart disease, and cancer  He or she will ask if you have symptoms that concern you, if you smoke, and about your mood  You may also be asked about your intake of medicines, supplements, food, and alcohol  Any of the following may be done:  · Your weight  will be checked  Your height may also be checked so your body mass index (BMI) can be calculated  Your BMI shows if you are at a healthy weight  · Your blood pressure  and heart rate will be checked  Your temperature may also be checked  · Blood and urine tests  may be done  Blood tests may be done to check your cholesterol levels  Abnormal cholesterol levels increase your risk for heart disease and stroke  You may also need a blood or urine test to check for diabetes if you are at increased risk  Urine tests may be done to look for signs of an infection or kidney disease  · A physical exam  includes checking your heartbeat and lungs with a stethoscope  Your healthcare provider may also check your skin to look for sun damage  · Screening tests  may be recommended  A screening test is done to check for diseases that may not cause symptoms  The screening tests you may need depend on your age, gender, family history, and lifestyle habits  For example, colorectal screening may be recommended if you are 48years old or older  Screening tests you need if you are a woman:   · A Pap smear  is used to screen for cervical cancer   Pap smears are usually done every 3 to 5 years depending on your age  You may need them more often if you have had abnormal Pap smear test results in the past  Ask your healthcare provider how often you should have a Pap smear  · A mammogram  is an x-ray of your breasts to screen for breast cancer  Experts recommend mammograms every 2 years starting at age 48 years  You may need a mammogram at age 52 years or younger if you have an increased risk for breast cancer  Talk to your healthcare provider about when you should start having mammograms and how often you need them  Vaccines you may need:   · Get an influenza vaccine  every year  The influenza vaccine protects you from the flu  Several types of viruses cause the flu  The viruses change over time, so new vaccines are made each year  · Get a tetanus-diphtheria (Td) booster vaccine  every 10 years  This vaccine protects you against tetanus and diphtheria  Tetanus is a severe infection that may cause painful muscle spasms and lockjaw  Diphtheria is a severe bacterial infection that causes a thick covering in the back of your mouth and throat  · Get a human papillomavirus (HPV) vaccine  if you are female and aged 23 to 32 or male 23 to 24 and never received it  This vaccine protects you from HPV infection  HPV is the most common infection spread by sexual contact  HPV may also cause vaginal, penile, and anal cancers  · Get a pneumococcal vaccine  if you are aged 72 years or older  The pneumococcal vaccine is an injection given to protect you from pneumococcal disease  Pneumococcal disease is an infection caused by pneumococcal bacteria  The infection may cause pneumonia, meningitis, or an ear infection  · Get a shingles vaccine  if you are 60 or older, even if you have had shingles before  The shingles vaccine is an injection to protect you from the varicella-zoster virus  This is the same virus that causes chickenpox   Shingles is a painful rash that develops in people who had chickenpox or have been exposed to the virus  How to eat healthy:  My Plate is a model for planning healthy meals  It shows the types and amounts of foods that should go on your plate  Fruits and vegetables make up about half of your plate, and grains and protein make up the other half  A serving of dairy is included on the side of your plate  The amount of calories and serving sizes you need depends on your age, gender, weight, and height  Examples of healthy foods are listed below:  · Eat a variety of vegetables  such as dark green, red, and orange vegetables  You can also include canned vegetables low in sodium (salt) and frozen vegetables without added butter or sauces  · Eat a variety of fresh fruits , canned fruit in 100% juice, frozen fruit, and dried fruit  · Include whole grains  At least half of the grains you eat should be whole grains  Examples include whole-wheat bread, wheat pasta, brown rice, and whole-grain cereals such as oatmeal     · Eat a variety of protein foods such as seafood (fish and shellfish), lean meat, and poultry without skin (turkey and chicken)  Examples of lean meats include pork leg, shoulder, or tenderloin, and beef round, sirloin, tenderloin, and extra lean ground beef  Other protein foods include eggs and egg substitutes, beans, peas, soy products, nuts, and seeds  · Choose low-fat dairy products such as skim or 1% milk or low-fat yogurt, cheese, and cottage cheese  · Limit unhealthy fats  such as butter, hard margarine, and shortening  Exercise:  Exercise at least 30 minutes per day on most days of the week  Some examples of exercise include walking, biking, dancing, and swimming  You can also fit in more physical activity by taking the stairs instead of the elevator or parking farther away from stores  Include muscle strengthening activities 2 days each week  Regular exercise provides many health benefits   It helps you manage your weight, and decreases your risk for type 2 diabetes, heart disease, stroke, and high blood pressure  Exercise can also help improve your mood  Ask your healthcare provider about the best exercise plan for you  General health and safety guidelines:   · Do not smoke  Nicotine and other chemicals in cigarettes and cigars can cause lung damage  Ask your healthcare provider for information if you currently smoke and need help to quit  E-cigarettes or smokeless tobacco still contain nicotine  Talk to your healthcare provider before you use these products  · Limit alcohol  A drink of alcohol is 12 ounces of beer, 5 ounces of wine, or 1½ ounces of liquor  · Lose weight, if needed  Being overweight increases your risk of certain health conditions  These include heart disease, high blood pressure, type 2 diabetes, and certain types of cancer  · Protect your skin  Do not sunbathe or use tanning beds  Use sunscreen with a SPF 15 or higher  Apply sunscreen at least 15 minutes before you go outside  Reapply sunscreen every 2 hours  Wear protective clothing, hats, and sunglasses when you are outside  · Drive safely  Always wear your seatbelt  Make sure everyone in your car wears a seatbelt  A seatbelt can save your life if you are in an accident  Do not use your cell phone when you are driving  This could distract you and cause an accident  Pull over if you need to make a call or send a text message  · Practice safe sex  Use latex condoms if are sexually active and have more than one partner  Your healthcare provider may recommend screening tests for sexually transmitted infections (STIs)  · Wear helmets, lifejackets, and protective gear  Always wear a helmet when you ride a bike or motorcycle, go skiing, or play sports that could cause a head injury  Wear protective equipment when you play sports  Wear a lifejacket when you are on a boat or doing water sports      © Copyright [employer] 2022 Information is for End User's use only and may not be sold, redistributed or otherwise used for commercial purposes  All illustrations and images included in CareNotes® are the copyrighted property of A D A M , Inc  or Katie Perez  The above information is an  only  It is not intended as medical advice for individual conditions or treatments  Talk to your doctor, nurse or pharmacist before following any medical regimen to see if it is safe and effective for you  Obesity   AMBULATORY CARE:   Obesity  means your body mass index (BMI) is greater than 30  Your healthcare provider will use your height and weight to measure your BMI  The risks of obesity include  many health problems, including injuries or physical disability  · Diabetes (high blood sugar level)    · High blood pressure or high cholesterol    · Heart disease    · Stroke    · Gallbladder or liver disease    · Cancer of the colon, breast, prostate, liver, or kidney    · Sleep apnea    · Arthritis or gout    Screening  is done to check for health conditions before you have signs or symptoms  If you are 28to 79years old, your blood sugar level may be checked every 3 years for signs of prediabetes or diabetes  Your healthcare provider will check your blood pressure at each visit  High blood pressure can lead to a stroke or other problems  Your provider may check for signs of heart disease, cancer, or other health problems  Seek care immediately if:   · You have a severe headache, confusion, or difficulty speaking  · You have weakness on one side of your body  · You have chest pain, sweating, or shortness of breath  Call your doctor if:   · You have symptoms of gallbladder or liver disease, such as pain in your upper abdomen  · You have knee or hip pain and discomfort while walking  · You have symptoms of diabetes, such as intense hunger and thirst, and frequent urination      · You have symptoms of sleep apnea, such as snoring or daytime sleepiness  · You have questions or concerns about your condition or care  Treatment for obesity  focuses on helping you lose weight to improve your health  Even a small decrease in BMI can reduce the risk for many health problems  Your healthcare provider will help you set a weight-loss goal   · Lifestyle changes  are the first step in treating obesity  These include making healthy food choices and getting regular physical activity  Your healthcare provider may suggest a weight-loss program that involves coaching, education, and therapy  · Medicine  may help you lose weight when it is used with a healthy foods and physical activity  · Surgery  can help you lose weight if you are very obese and have other health problems  There are several types of weight-loss surgery  Ask your healthcare provider for more information  Tips for safe weight loss:   · Set small, realistic goals  An example of a small goal is to walk for 20 minutes 5 days a week  Anther goal is to lose 5% of your body weight  · Tell friends, family members, and coworkers about your goals  and ask for their support  Ask a friend to lose weight with you, or join a weight-loss support group  · Identify foods or triggers that may cause you to overeat , and find ways to avoid them  Remove tempting high-calorie foods from your home and workplace  Place a bowl of fresh fruit on your kitchen counter  If stress causes you to eat, then find other ways to cope with stress  A counselor or therapist may be able to help you  · Keep a diary to track what you eat and drink  Also write down how many minutes of physical activity you do each day  Weigh yourself once a week and record it in your diary  Eating changes: You will need to eat 500 to 1,000 fewer calories each day than you currently eat to lose 1 to 2 pounds a week  The following changes will help you cut calories:  · Eat smaller portions    Use small plates, no larger than 9 inches in diameter  Fill your plate half full of fruits and vegetables  Measure your food using measuring cups until you know what a serving size looks like  · Eat 3 meals and 1 or 2 snacks each day  Plan your meals in advance  Ivy Arellano and eat at home most of the time  Eat slowly  Do not skip meals  Skipping meals can lead to overeating later in the day  This can make it harder for you to lose weight  Talk with a dietitian to help you make a meal plan and schedule that is right for you  · Eat fruits and vegetables at every meal   They are low in calories and high in fiber, which makes you feel full  Do not add butter, margarine, or cream sauce to vegetables  Use herbs to season steamed vegetables  · Eat less fat and fewer fried foods  Eat more baked or grilled chicken and fish  These protein sources are lower in calories and fat than red meat  Limit fast food  Dress your salads with olive oil and vinegar instead of bottled dressing  · Limit the amount of sugar you eat  Do not drink sugary beverages  Limit alcohol  Activity changes:  Physical activity is good for your body in many ways  It helps you burn calories and build strong muscles  It decreases stress and depression, and improves your mood  It can also help you sleep better  Talk to your healthcare provider before you begin an exercise program   · Exercise for at least 30 minutes 5 days a week  Start slowly  Set aside time each day for physical activity that you enjoy and that is convenient for you  It is best to do both weight training and an activity that increases your heart rate, such as walking, bicycling, or swimming  · Find ways to be more active  Do yard work and housecleaning  Walk up the stairs instead of using elevators  Spend your leisure time going to events that require walking, such as outdoor festivals or fairs  This extra physical activity can help you lose weight and keep it off         Follow up with your doctor as directed: You may need to meet with a dietitian  Write down your questions so you remember to ask them during your visits  © Copyright [employer] 2022 Information is for End User's use only and may not be sold, redistributed or otherwise used for commercial purposes  All illustrations and images included in CareNotes® are the copyrighted property of A D A M , Inc  or [facility] Russell Castelan   The above information is an  only  It is not intended as medical advice for individual conditions or treatments  Talk to your doctor, nurse or pharmacist before following any medical regimen to see if it is safe and effective for you

## 2022-12-07 NOTE — PROGRESS NOTES
Assessment/Plan:    Mild episode of recurrent major depressive disorder ([facility] [address] )  PHQ-9=5  Feels her depression is controlled  Generalized anxiety disorder  RUDOLPH-7=6  Increase in anxiety and intrusive thoughts since starting Vyvanse again  Will increase sertraline to 50 mg as this is what she was on previously  F/U in 4 weeks  ADD (attention deficit disorder) without hyperactivity  Does not feel symptoms are controlled  She notes increased anxiety since starting  We discussed her anxiety is likely uncontrolled which is why VyVanse (which previously was very effective) is not working for her  She is agreeable to increasing sertraline to 50 mg (which is what she was taking when on Vyvanse previously)  Plan to f/u in 4 weeks  Diagnoses and all orders for this visit:    Mild episode of recurrent major depressive disorder (HCC)    Generalized anxiety disorder  -     sertraline (ZOLOFT) 50 mg tablet; Take 1 tablet (50 mg total) by mouth daily  -     TSH, 3rd generation with Free T4 reflex; Future  -     TSH, 3rd generation with Free T4 reflex    ADD (attention deficit disorder) without hyperactivity    Annual physical exam    Screening cholesterol level  -     Lipid panel; Future  -     Lipid panel    Screening for endocrine, metabolic and immunity disorder  -     CBC and differential; Future  -     Comprehensive metabolic panel; Future  -     CBC and differential  -     Comprehensive metabolic panel          Subjective:      Patient ID: Shereen Michael is a 33 y o  female  Mood is good  Anxiety is high  Increase in intrusive thoughts  Not out of control  Feels that on higher sertraline dose she feels aloof  Has stopped breast feeding  Does not feel Vyvanse is working at all  She had restarted her previous dose a few months ago  Her ADHD was controlled previously on this dose but she was on 50 mg sertraline at the time         The following portions of the patient's history were reviewed and updated as appropriate: allergies, current medications, past family history, past medical history, past social history, past surgical history and problem list     Review of Systems   Constitutional: Negative for fatigue and unexpected weight change  Psychiatric/Behavioral: Positive for decreased concentration and sleep disturbance  Negative for agitation, dysphoric mood and suicidal ideas  The patient is nervous/anxious  Objective:  Vitals:    12/07/22 1330   BP: 124/75   Pulse: 86   Temp: 97 9 °F (36 6 °C)   SpO2: 99%      Physical Exam  Vitals reviewed  Constitutional:       Appearance: Normal appearance  She is well-developed  She is obese  HENT:      Head: Normocephalic and atraumatic  Right Ear: External ear normal       Left Ear: External ear normal       Nose: Nose normal       Mouth/Throat:      Mouth: Mucous membranes are moist       Pharynx: Oropharynx is clear  Eyes:      Conjunctiva/sclera: Conjunctivae normal       Pupils: Pupils are equal, round, and reactive to light  Neck:      Thyroid: No thyromegaly  Cardiovascular:      Rate and Rhythm: Normal rate and regular rhythm  Heart sounds: Normal heart sounds  No murmur heard  Pulmonary:      Effort: Pulmonary effort is normal       Breath sounds: Normal breath sounds  Abdominal:      General: Abdomen is flat  Bowel sounds are normal       Palpations: Abdomen is soft  There is no hepatomegaly or splenomegaly  Tenderness: There is no abdominal tenderness  Musculoskeletal:         General: Normal range of motion  Cervical back: Normal range of motion and neck supple  Lymphadenopathy:      Cervical: No cervical adenopathy  Skin:     General: Skin is warm and dry  Neurological:      Mental Status: She is alert and oriented to person, place, and time  Psychiatric:         Mood and Affect: Mood normal          Behavior: Behavior normal          Thought Content:  Thought content normal          Judgment: Judgment normal

## 2022-12-07 NOTE — ASSESSMENT & PLAN NOTE
Does not feel symptoms are controlled  She notes increased anxiety since starting  We discussed her anxiety is likely uncontrolled which is why VyVanse (which previously was very effective) is not working for her  She is agreeable to increasing sertraline to 50 mg (which is what she was taking when on Vyvanse previously)  Plan to f/u in 4 weeks

## 2022-12-07 NOTE — PROGRESS NOTES
[facility] [address] [facility] [address]     NAME: Katia Bal  AGE: 33 y o  SEX: female  : 1990     DATE: 2022     Assessment and Plan:     Problem List Items Addressed This Visit        Other    ADD (attention deficit disorder) without hyperactivity     Does not feel symptoms are controlled  She notes increased anxiety since starting  We discussed her anxiety is likely uncontrolled which is why VyVanse (which previously was very effective) is not working for her  She is agreeable to increasing sertraline to 50 mg (which is what she was taking when on Vyvanse previously)  Plan to f/u in 4 weeks  Relevant Medications    sertraline (ZOLOFT) 50 mg tablet    Generalized anxiety disorder     RUDOLPH-7=6  Increase in anxiety and intrusive thoughts since starting Vyvanse again  Will increase sertraline to 50 mg as this is what she was on previously  F/U in 4 weeks  Relevant Medications    sertraline (ZOLOFT) 50 mg tablet    Other Relevant Orders    TSH, 3rd generation with Free T4 reflex    Mild episode of recurrent major depressive disorder (HCC) - Primary     PHQ-9=5  Feels her depression is controlled  Relevant Medications    sertraline (ZOLOFT) 50 mg tablet   Other Visit Diagnoses     Annual physical exam        Screening cholesterol level        Relevant Orders    Lipid panel    Screening for endocrine, metabolic and immunity disorder        Relevant Orders    CBC and differential    Comprehensive metabolic panel          Immunizations and preventive care screenings were discussed with patient today  Appropriate education was printed on patient's after visit summary  Counseling:  Alcohol/drug use: discussed moderation in alcohol intake, the recommendations for healthy alcohol use, and avoidance of illicit drug use    Dental Health: discussed importance of regular tooth brushing, flossing, and dental visits  Injury prevention: discussed safety/seat belts, safety helmets, smoke detectors, carbon dioxide detectors, and smoking near bedding or upholstery  Sexual health: discussed sexually transmitted diseases, partner selection, use of condoms, avoidance of unintended pregnancy, and contraceptive alternatives  · Exercise: the importance of regular exercise/physical activity was discussed  Recommend exercise 3-5 times per week for at least 30 minutes  BMI Counseling: Body mass index is 39 37 kg/m²  The BMI is above normal  Nutrition recommendations include decreasing portion sizes, encouraging healthy choices of fruits and vegetables, decreasing fast food intake, consuming healthier snacks, limiting drinks that contain sugar, moderation in carbohydrate intake and increasing intake of lean protein  Exercise recommendations include moderate physical activity 150 minutes/week  No pharmacotherapy was ordered  Rationale for BMI follow-up plan is due to patient being overweight or obese  Return in about 4 weeks (around 2023) for Next scheduled follow up  Chief Complaint:     Chief Complaint   Patient presents with   • Follow-up      History of Present Illness:     Adult Annual Physical   Patient here for a comprehensive physical exam  The patient reports no problems  Diet and Physical Activity  · Diet/Nutrition: well balanced diet  · Exercise: walking        Depression Screening  PHQ-2/9 Depression Screening    Little interest or pleasure in doing things: 0 - not at all  Feeling down, depressed, or hopeless: 0 - not at all  Trouble falling or staying asleep, or sleeping too much: 3 - nearly every day  Feeling tired or having little energy: 0 - not at all  Poor appetite or overeatin - not at all  Feeling bad about yourself - or that you are a failure or have let yourself or your family down: 0 - not at all  Trouble concentrating on things, such as reading the newspaper or watching television: 2 - more than half the days  Moving or speaking so slowly that other people could have noticed  Or the opposite - being so fidgety or restless that you have been moving around a lot more than usual: 0 - not at all  Thoughts that you would be better off dead, or of hurting yourself in some way: 0 - not at all  PHQ-9 Score: 5   PHQ-9 Interpretation: Mild depression        General Health  · Sleep: sleeps poorly and gets 4-6 hours of sleep on average  · Hearing: normal - bilateral   · Vision: no vision problems  · Dental: regular dental visits  /GYN Health  · Last menstrual period:   · Contraceptive method: oral contraceptives  · History of STDs?: no      Review of Systems:     Review of Systems   Constitutional: Negative  Negative for fatigue  HENT: Negative  Eyes: Negative  Respiratory: Negative  Cardiovascular: Negative  Gastrointestinal: Negative  Endocrine: Negative  Genitourinary: Negative  Musculoskeletal: Negative  Skin: Negative  Neurological: Negative  Hematological: Negative  Psychiatric/Behavioral: Positive for decreased concentration and sleep disturbance  Negative for agitation, dysphoric mood and suicidal ideas  The patient is nervous/anxious  Past Medical History:     Past Medical History:   Diagnosis Date   • Alcohol abuse     in past   • Anxiety    • Arthritis     bilateral hips   • Cyst of breast, left    • Depression    • Female infertility    • Lump of left breast     LAST ASSESSED: 17   • PCOS (polycystic ovarian syndrome)    • Polycystic ovary syndrome    • [facility]     Marijuana use in past   • Varicella       Past Surgical History:     Past Surgical History:   Procedure Laterality Date   • DILATION AND EVACUATION  2019   • LA  DELIVERY ONLY N/A 2021    Procedure:  SECTION (); Surgeon: Leslee Duran MD;  Location: [facility];  Service: Obstetrics   • LA INDUCED ABORTN BY DIL/EVAC N/A 2019    Procedure: DILATATION AND EVACUATION (D&E) 15 Weeks;  Surgeon: Yves Severin, MD;  Location: [facility];  Service: Gynecology   • TONSILLECTOMY        Social History:     Social History     Socioeconomic History   • Marital status: /Civil Union     Spouse name: None   • Number of children: None   • Years of education: None   • Highest education level: None   Occupational History   • Occupation: FULL-TIME EMPLOYMENT   Tobacco Use   • Smoking status: Former     Types: Cigarettes     Quit date: 2019     Years since quitting: 3 4   • Smokeless tobacco: Never   • Tobacco comments:     Quit   Vaping Use   • Vaping Use: Never used   Substance and Sexual Activity   • Alcohol use: Not Currently     Comment: sober 7 years   • Drug use: Not Currently     Types: Marijuana     Comment: quit with pregnancy/ was using once a day prior   • Sexual activity: Yes     Partners: Male     Birth control/protection: None   Other Topics Concern   • None   Social History Narrative    DAILY [employer], 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     Social Determinants of Health     Financial Resource Strain: Not on file   Food Insecurity: Not on file   Transportation Needs: Not on file   Physical Activity: Not on file   Stress: Not on file   Social Connections: Not on file   Intimate Partner Violence: Not on file   Housing Stability: Not on file      Family History:     Family History   Problem Relation Age of Onset   • No Known Problems Mother    • Substance Abuse Father         father  in  [place]   • Alcohol abuse Father    • Coronary artery disease Paternal Grandfather    • Arthritis Paternal Grandfather    • Alcohol abuse Other    • Depression Other    • Substance Abuse Other    • Arthritis Family    • No Known Problems Sister    • No Known Problems Brother    • No Known Problems Maternal Grandmother    • Thrombophlebitis Paternal Grandmother    • Breast cancer Neg Hx    • Colon cancer Neg Hx    • Ovarian cancer Neg Hx       Current Medications:     Current Outpatient Medications   Medication Sig Dispense Refill   • lisdexamfetamine (Vyvanse) 60 MG capsule Take 1 capsule (60 mg total) by mouth every morning Max Daily Amount: 60 mg 30 capsule 0   • loratadine (CLARITIN) 10 mg tablet Take 10 mg by mouth daily     • norethindrone (Ortho Micronor) 0 35 MG tablet Take 1 tablet (0 35 mg total) by mouth daily 84 tablet 3   • sertraline (ZOLOFT) 50 mg tablet Take 1 tablet (50 mg total) by mouth daily 30 tablet 1     No current facility-administered medications for this visit  Allergies:     No Known Allergies   Physical Exam:     /75 (BP Location: Left arm, Patient Position: Sitting, Cuff Size: Standard)   Pulse 86   Temp 97 9 °F (36 6 °C) (Tympanic)   Ht 5' 10" (1 778 m)   Wt 124 kg (274 lb 6 4 oz)   SpO2 99%   BMI 39 37 kg/m²     Physical Exam  Vitals reviewed  Constitutional:       Appearance: Normal appearance  She is obese  HENT:      Head: Normocephalic and atraumatic  Right Ear: Tympanic membrane, ear canal and external ear normal       Left Ear: Tympanic membrane, ear canal and external ear normal       Nose: Nose normal       Mouth/Throat:      Mouth: Mucous membranes are moist       Pharynx: Oropharynx is clear  Eyes:      Conjunctiva/sclera: Conjunctivae normal       Pupils: Pupils are equal, round, and reactive to light  Neck:      Thyroid: No thyromegaly  Cardiovascular:      Rate and Rhythm: Normal rate and regular rhythm  Heart sounds: Normal heart sounds  No murmur heard  Pulmonary:      Effort: Pulmonary effort is normal       Breath sounds: Normal breath sounds  Abdominal:      General: Abdomen is flat  Bowel sounds are normal       Palpations: Abdomen is soft  There is no hepatomegaly or splenomegaly  Tenderness: There is no abdominal tenderness  Musculoskeletal:         General: Normal range of motion        Cervical back: Normal range of motion and neck supple  Skin:     General: Skin is warm and dry  Capillary Refill: Capillary refill takes less than 2 seconds  Neurological:      General: No focal deficit present  Mental Status: She is alert and oriented to person, place, and time  Psychiatric:         Mood and Affect: Mood normal          Behavior: Behavior normal          Thought Content:  Thought content normal          Judgment: Judgment normal           [place] [phone]

## 2022-12-23 DIAGNOSIS — F98.8 ADD (ATTENTION DEFICIT DISORDER) WITHOUT HYPERACTIVITY: ICD-10-CM

## 2023-01-09 DIAGNOSIS — Z30.41 SURVEILLANCE FOR BIRTH CONTROL, ORAL CONTRACEPTIVES: ICD-10-CM

## 2023-01-10 DIAGNOSIS — F41.1 GENERALIZED ANXIETY DISORDER: ICD-10-CM

## 2023-01-12 RX ORDER — ACETAMINOPHEN AND CODEINE PHOSPHATE 120; 12 MG/5ML; MG/5ML
SOLUTION ORAL
Qty: 84 TABLET | Refills: 3 | Status: SHIPPED | OUTPATIENT
Start: 2023-01-12

## 2023-01-25 DIAGNOSIS — F98.8 ADD (ATTENTION DEFICIT DISORDER) WITHOUT HYPERACTIVITY: ICD-10-CM

## 2023-01-31 ENCOUNTER — OFFICE VISIT (OUTPATIENT)
Dept: FAMILY MEDICINE CLINIC | Facility: HOSPITAL | Age: 33
End: 2023-01-31

## 2023-01-31 VITALS
HEART RATE: 94 BPM | TEMPERATURE: 97.4 F | HEIGHT: 70 IN | DIASTOLIC BLOOD PRESSURE: 80 MMHG | OXYGEN SATURATION: 98 % | BODY MASS INDEX: 38.71 KG/M2 | WEIGHT: 270.4 LBS | SYSTOLIC BLOOD PRESSURE: 128 MMHG

## 2023-01-31 DIAGNOSIS — F98.8 ADD (ATTENTION DEFICIT DISORDER) WITHOUT HYPERACTIVITY: ICD-10-CM

## 2023-01-31 DIAGNOSIS — F41.1 GENERALIZED ANXIETY DISORDER: ICD-10-CM

## 2023-01-31 DIAGNOSIS — F33.0 MILD EPISODE OF RECURRENT MAJOR DEPRESSIVE DISORDER (HCC): Primary | ICD-10-CM

## 2023-01-31 NOTE — PROGRESS NOTES
Assessment/Plan:    Mild episode of recurrent major depressive disorder (HCC)  PHQ-9=4  Overall improved with less intrusive thoughts  Will be looking into therapy  Continue on 50 mg sertraline  F/U in 6 months  Call with any worsening mood  Generalized anxiety disorder  RUDOLPH-7=7  Controlled   Continue on current regimen  ADD (attention deficit disorder) without hyperactivity  Controlled  Continue on current regimen  Diagnoses and all orders for this visit:    Mild episode of recurrent major depressive disorder ([facility] [address] )    Generalized anxiety disorder    ADD (attention deficit disorder) without hyperactivity          Subjective:      Patient ID: Mary Ann Blackwood is a 33 y o  female  Feels better since increasing sertraline  Less "dark" thoughts  Feels mood is back to normal  Intrusive thoughts have gone away  Looking into therapist  Has family stuff going on but feel it is manageable  Feels focus is ok  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 so unsure if focus is affected by that or ADD  The following portions of the patient's history were reviewed and updated as appropriate: allergies, current medications, past family history, past medical history, past social history, past surgical history and problem list     Review of Systems   Constitutional: Positive for fatigue  Psychiatric/Behavioral: Positive for decreased concentration and sleep disturbance  Negative for dysphoric mood  The patient is nervous/anxious  Objective:  Vitals:    01/31/23 1048   BP: 128/80   Pulse: 94   Temp: (!) 97 4 °F (36 3 °C)   SpO2: 98%      Physical Exam  Vitals reviewed  Constitutional:       Appearance: Normal appearance  Cardiovascular:      Rate and Rhythm: Normal rate and regular rhythm  Heart sounds: Normal heart sounds  Pulmonary:      Effort: Pulmonary effort is normal       Breath sounds: Normal breath sounds  Skin:     General: Skin is warm and dry     Neurological:      Mental Status: She is alert and oriented to person, place, and time  Psychiatric:         Mood and Affect: Mood normal          Behavior: Behavior normal          Thought Content:  Thought content normal          Judgment: Judgment normal

## 2023-01-31 NOTE — ASSESSMENT & PLAN NOTE
PHQ-9=4  Overall improved with less intrusive thoughts  Will be looking into therapy  Continue on 50 mg sertraline  F/U in 6 months  Call with any worsening mood

## 2023-02-24 DIAGNOSIS — F98.8 ADD (ATTENTION DEFICIT DISORDER) WITHOUT HYPERACTIVITY: ICD-10-CM

## 2023-03-29 DIAGNOSIS — F98.8 ADD (ATTENTION DEFICIT DISORDER) WITHOUT HYPERACTIVITY: ICD-10-CM

## 2023-04-28 DIAGNOSIS — F98.8 ADD (ATTENTION DEFICIT DISORDER) WITHOUT HYPERACTIVITY: ICD-10-CM

## 2023-05-31 DIAGNOSIS — F98.8 ADD (ATTENTION DEFICIT DISORDER) WITHOUT HYPERACTIVITY: ICD-10-CM

## 2023-06-30 DIAGNOSIS — F98.8 ADD (ATTENTION DEFICIT DISORDER) WITHOUT HYPERACTIVITY: ICD-10-CM

## 2023-06-30 NOTE — TELEPHONE ENCOUNTER
Requested medication(s) are due for refill today: Yes  Patient has already received a courtesy refill: No  Other reason request has been forwarded to provider: are you willing to refill going into the weekend?   Please advise, thank you

## 2023-07-11 ENCOUNTER — TELEPHONE (OUTPATIENT)
Dept: OBGYN CLINIC | Facility: MEDICAL CENTER | Age: 33
End: 2023-07-11

## 2023-07-11 DIAGNOSIS — N92.6 MISSED MENSES: Primary | ICD-10-CM

## 2023-07-11 NOTE — TELEPHONE ENCOUNTER
Pt lmom for office staff to request an apt due to positive pregnancy test. Attempted to call patient to review protocol for positive pregnancy test. lmom for patient to call office to discuss

## 2023-07-14 ENCOUNTER — TELEPHONE (OUTPATIENT)
Dept: OBGYN CLINIC | Facility: MEDICAL CENTER | Age: 33
End: 2023-07-14

## 2023-07-14 DIAGNOSIS — N92.6 MISSED MENSES: Primary | ICD-10-CM

## 2023-07-15 LAB
HCG INTACT+B SERPL-ACNC: 911 MIU/ML
PROGEST SERPL-MCNC: 9.5 NG/ML

## 2023-07-17 ENCOUNTER — TELEPHONE (OUTPATIENT)
Dept: OBGYN CLINIC | Facility: MEDICAL CENTER | Age: 33
End: 2023-07-17

## 2023-07-17 DIAGNOSIS — N92.6 MISSED MENSES: Primary | ICD-10-CM

## 2023-07-17 NOTE — TELEPHONE ENCOUNTER
Patient called for us to place [facility] orders in her chart. Per lab they are unable to use the ones previously placed.

## 2023-07-18 ENCOUNTER — TELEPHONE (OUTPATIENT)
Dept: OBGYN CLINIC | Facility: MEDICAL CENTER | Age: 33
End: 2023-07-18

## 2023-07-18 DIAGNOSIS — Z87.59 HISTORY OF PRETERM PREMATURE RUPTURE OF MEMBRANES (PPROM): Primary | ICD-10-CM

## 2023-07-18 DIAGNOSIS — N92.6 MISSED MENSES: Primary | ICD-10-CM

## 2023-07-18 LAB
ABO GROUP BLD: NORMAL
BLD GP AB SCN SERPL QL: NEGATIVE
HCG INTACT+B SERPL-ACNC: 2375 MIU/ML
PROGEST SERPL-MCNC: 7.5 NG/ML
RH BLD: POSITIVE

## 2023-07-18 NOTE — TELEPHONE ENCOUNTER
Did she use this last pregnancy? Apologies, I am not seeing the prior Rx on her chart, which is why I didn't mention it with result. This is reasonable though, given hx of PPROM. I'll send in vaginal progesterone for her to start.

## 2023-07-18 NOTE — RESULT ENCOUNTER NOTE
Please call patient with results. Labs are rising appropriately. Approx 4-6wk based on levels; inconsistent with LMP. Needs US for pregnancy dating/location. Can complete here or with radiology in 2-3wk. Can offer repeat hcg in 1wk to follow in interim.

## 2023-07-18 NOTE — TELEPHONE ENCOUNTER
Pt was wondering if she needs to be prescribed progesterone because she has had issues with her past pregnancies with her progesterone

## 2023-07-21 ENCOUNTER — TELEPHONE (OUTPATIENT)
Dept: OBGYN CLINIC | Facility: MEDICAL CENTER | Age: 33
End: 2023-07-21

## 2023-07-21 DIAGNOSIS — F41.1 GENERALIZED ANXIETY DISORDER: ICD-10-CM

## 2023-07-21 NOTE — TELEPHONE ENCOUNTER
Called patient and advised rx was called into pharmacy. They will call her when it is ready.   Patient will call with any further questions or concerns

## 2023-08-02 ENCOUNTER — HOSPITAL ENCOUNTER (OUTPATIENT)
Dept: ULTRASOUND IMAGING | Facility: MEDICAL CENTER | Age: 33
Discharge: HOME | End: 2023-08-02
Payer: COMMERCIAL

## 2023-08-02 DIAGNOSIS — N92.6 MISSED MENSES: ICD-10-CM

## 2023-08-02 PROCEDURE — 76801 OB US < 14 WKS SINGLE FETUS: CPT

## 2023-08-11 ENCOUNTER — TELEPHONE (OUTPATIENT)
Dept: OBGYN CLINIC | Facility: CLINIC | Age: 33
End: 2023-08-11

## 2023-08-11 DIAGNOSIS — Z34.81 PRENATAL CARE, SUBSEQUENT PREGNANCY, FIRST TRIMESTER: Primary | ICD-10-CM

## 2023-08-14 ENCOUNTER — TELEPHONE (OUTPATIENT)
Facility: HOSPITAL | Age: 33
End: 2023-08-14

## 2023-08-14 ENCOUNTER — INITIAL PRENATAL (OUTPATIENT)
Dept: OBGYN CLINIC | Facility: MEDICAL CENTER | Age: 33
End: 2023-08-14

## 2023-08-14 VITALS
SYSTOLIC BLOOD PRESSURE: 126 MMHG | WEIGHT: 271.4 LBS | DIASTOLIC BLOOD PRESSURE: 76 MMHG | HEIGHT: 70 IN | BODY MASS INDEX: 38.85 KG/M2

## 2023-08-14 DIAGNOSIS — Z34.81 PRENATAL CARE, SUBSEQUENT PREGNANCY, FIRST TRIMESTER: Primary | ICD-10-CM

## 2023-08-14 PROCEDURE — OBC

## 2023-08-14 NOTE — PROGRESS NOTES
[id number]  OB History    Para Term  AB Living   4 1 1 0 2 1   SAB IAB Ectopic Multiple Live Births   1 0 0 0 1      # Outcome Date GA Lbr Geovany/2nd Weight Sex Delivery Anes PTL Lv   4 Current            3 Term 21 40w1d  4000 g (8 lb 13.1 oz) F CS-LTranv EPI N KE   2 SAB      SAB         Birth Comments: early first trimester SAB   1 AB 19 15w3d       FD      Birth Comments: D&E      Complications: Abruptio Placenta,  premature rupture of membranes (PPROM) with unknown onset of labor         * Pt presents for OB intake  *[id number]  *Pt's LMP was Patient's last menstrual period was 2023 (approximate). *Ultrasound date:2023   7weeks 4days  *Estimated date of delivery: 2024   * confirmed by ultrasound    *Signs/Symptoms of Pregnancy   *no Constipation    *no Headaches   *no Cramping  *no Spotting   *yes  Nausea     Diabetes  If any of the following answers are  yes, please order 1 hour GTT, 50g   *no Hx of GDM    *yes BMI >35    *no First degree relative with type 2 diabetes    *yes Hx of PCOS   *no Current metformin use    *no Prior hx of LGA/macrosomia    *no AMA with other risk factors    Hypertension-    *no Hx of chronic HTN    *no Hx of gestational HTN   *no hx of preeclampsia, eclampsia, or HELLP syndrome     *Infection Screening   *no Does the pt have a hx of MRSA? *if yes- follow MRSA protocol and obtain a nasal swab for MRSA culture   *no History of herpes?      *Immunizations:   *yes Discussed influenza vaccine     - not flu season   *yes Discussed TDaP vaccine   *yes COVID Vaccine x2    SOCIOECONOMIC:  Mental/Physical/Sexual Abuse  *no  Housing Security  *no  Food Security  *no  Speacial Needs/Challenges  *no  Substance Use Disorder   ETOH   *yes - history, 10 years sober   OPIOD   *no     THC *no    Other: no  Opioid Therapy *no    ACTIVE MEDICAL/MENTAL HEALTH CONDITIONS  Depression *yes   Anxiety*yes  Medications*yes    - taking zoloft and followed by PCP    *Interview education   *Handouts given:    *Baby and Me support center     *MyChart sign up instructions    *Lab Locations    *[facility] Pediatricians List/Choosing Pediatrician Sheet    *[employer] Child[facility] Parenting Classes    *Schedule for Prenatal Visits    *Pregnancy Warning Signs Reviewed    *Safe Medications During Pregnancy    *SMA and CF Testing information sheet    *CPT Code Sheet/MFM 13week NT pamphlet    *[employer]      SBIRT Screen:  Depression Screening Follow-up Plan: Patient's depression screening was negative with an As[place] score of 2        *[facility]   *yes discussed genetic testing- pt interested     Patient has been informed of basic prenatal advice such as avoiding alcohol, drugs, and smoking. She should remain hydrated and take daily prenatal vitamins. Patient should avoid caffeine, raw sprouts, high mercury fish, undercooked fish, raw eggs, organ meat, unwashed produce, and unpasteurized cheeses, milk, and fruit juice and undercooked meats. She has been informed about toxoplasmosis and to avoid cat feces. *Details that I feel the provider should be aware of:  Michael Rg presented today for initial ob intake at 9w2d. She has no complaints at this time. Reviewed medications she can take during pregnancy as well as warning signs and when to contact office. Prenatal labs, early 1hr gtt ordered. [facility] appointments scheduled    PN1 visit scheduled for *09/08/2023. The patient was oriented to our practice and all questions were answered.     Interviewed by:  Yvrose Chaudhry RN

## 2023-08-16 DIAGNOSIS — F41.1 GENERALIZED ANXIETY DISORDER: ICD-10-CM

## 2023-08-16 NOTE — TELEPHONE ENCOUNTER
I will not refill for 90 days. She is overdue for f/u and needs f/u in order to continue to receive refills.

## 2023-08-24 LAB
ABO GROUP BLD: NORMAL
BACTERIA UR CULT: ABNORMAL
BASOPHILS # BLD AUTO: 0.1 X10E3/UL (ref 0–0.2)
BASOPHILS NFR BLD AUTO: 1 %
BLD GP AB SCN SERPL QL: NEGATIVE
EOSINOPHIL # BLD AUTO: 0.1 X10E3/UL (ref 0–0.4)
EOSINOPHIL NFR BLD AUTO: 1 %
ERYTHROCYTE [DISTWIDTH] IN BLOOD BY AUTOMATED COUNT: 12.6 % (ref 11.7–15.4)
FERRITIN SERPL-MCNC: 62 NG/ML (ref 15–150)
GLUCOSE 1H P 50 G GLC PO SERPL-MCNC: 86 MG/DL (ref 70–139)
HBV SURFACE AB SER-ACNC: 14 MIU/ML
HCT VFR BLD AUTO: 39.7 % (ref 34–46.6)
HCV AB S/CO SERPL IA: NON REACTIVE
HGB BLD-MCNC: 13.6 G/DL (ref 11.1–15.9)
HIV 1+2 AB+HIV1 P24 AG SERPL QL IA: NON REACTIVE
IMM GRANULOCYTES # BLD: 0 X10E3/UL (ref 0–0.1)
IMM GRANULOCYTES NFR BLD: 0 %
IRON SERPL-MCNC: 101 UG/DL (ref 27–159)
LYMPHOCYTES # BLD AUTO: 1.8 X10E3/UL (ref 0.7–3.1)
LYMPHOCYTES NFR BLD AUTO: 22 %
Lab: ABNORMAL
MCH RBC QN AUTO: 30.8 PG (ref 26.6–33)
MCHC RBC AUTO-ENTMCNC: 34.3 G/DL (ref 31.5–35.7)
MCV RBC AUTO: 90 FL (ref 79–97)
MONOCYTES # BLD AUTO: 0.5 X10E3/UL (ref 0.1–0.9)
MONOCYTES NFR BLD AUTO: 6 %
NEUTROPHILS # BLD AUTO: 5.8 X10E3/UL (ref 1.4–7)
NEUTROPHILS NFR BLD AUTO: 70 %
PLATELET # BLD AUTO: 259 X10E3/UL (ref 150–450)
RBC # BLD AUTO: 4.41 X10E6/UL (ref 3.77–5.28)
RH BLD: POSITIVE
RPR SER QL: NON REACTIVE
RUBV IGG SERPL IA-ACNC: 1.32 INDEX
WBC # BLD AUTO: 8.3 X10E3/UL (ref 3.4–10.8)

## 2023-09-01 ENCOUNTER — TELEPHONE (OUTPATIENT)
Facility: HOSPITAL | Age: 33
End: 2023-09-01

## 2023-09-01 NOTE — TELEPHONE ENCOUNTER
Left voicemail for pt regarding the date change for her appt. Apt was originally scheduled for 10/30/2023 and was rescheduled to 11/3/2023 at 10:15AM. Requested pt call if she has any questions or would need to reschedule.

## 2023-09-07 PROBLEM — B95.1 POSITIVE GBS TEST: Status: ACTIVE | Noted: 2023-09-07

## 2023-09-07 PROBLEM — F32.A DEPRESSION DURING PREGNANCY IN FIRST TRIMESTER: Status: ACTIVE | Noted: 2023-09-07

## 2023-09-07 PROBLEM — Z87.59 HISTORY OF PLACENTA ABRUPTION: Status: ACTIVE | Noted: 2023-09-07

## 2023-09-07 PROBLEM — O99.210 OBESITY IN PREGNANCY: Status: ACTIVE | Noted: 2023-09-07

## 2023-09-07 PROBLEM — O34.219 PREVIOUS CESAREAN SECTION COMPLICATING PREGNANCY: Status: ACTIVE | Noted: 2023-09-07

## 2023-09-07 PROBLEM — O99.341 DEPRESSION DURING PREGNANCY IN FIRST TRIMESTER: Status: ACTIVE | Noted: 2023-09-07

## 2023-09-07 PROBLEM — Z3A.12 12 WEEKS GESTATION OF PREGNANCY: Status: ACTIVE | Noted: 2023-09-07

## 2023-09-07 NOTE — PROGRESS NOTES
Prenatal H&P     Denies loss of fluid, vaginal discharge, vaginal bleeding  and abdominal pain. Not feeling fetal movement. Tolerating PNV and zoloft well. Stopped vaginal progesterone at 12 weeks. Stopped Vyvanse with positive pregnancy test.  Prenatal panel reviewed significant for GBS. Reviewed treatment during labor. Plans for genetic screening appointment scheduled 23. Planned  but welcomed to pregnancy. 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 She denies a family history of inheritable conditions such as physical or intellectual disabilities, birth defects, blood disorders, heart or neural tube defects. She has never had MRSA. She denies recent travel or travel planned in the near future. Patient Plans   - Feeding breast-feeding   - Contraception uncertain  - Aware office delivers at [facility].  If < 32 weeks will recommend evaluation at [address] [place] [address]:  - Continue prenatal vitamin daily  - Genetic screening/ [facility] appointment scheduled 23   -Aspirin 162 mg ordered daily until 36 weeks  -Prior  section. Patient planning repeat  section  -Depression/anxiety/ADD continue Zoloft as ordered. Encouraged to call office with worsening symptoms, thoughts of self-harm or harm to others  -History of placental abruption, PPROM and delivery at 15 weeks. Used vaginal progesterone until 12 weeks  -  Weight gain in pregnancy-Who BMI recommend  11-20 lb . Healthy diet and daily exercise reviewed and encouraged  - Unit regimen reviewed visit every 4 weeks until 28 weeks, every 2 weeks until 36 weeks and then weekly until delivery. -Gonorrhea/chlamydia collected. Pap smear collected  - Vaccines in Pregnancy      - TDAP vaccine after 27 gestational weeks     - Reviewed with patient  Pregnancy with COVID infection is considered  high risk and can have poor outcome including  delivery, more severe infection. therefore  pregnant patients are recommended to get  COVID-19 vaccination. Written information provided- had vaccine will bring in card      - influenza October- March NA   -  Common discomforts of pregnancy and precautions reviewed. Signs and symptoms to report reviewed. Encouraged social distancing, good hand hygiene, masking, avoiding crowds and written information provided about COVID-19.   RTO 4 weeks

## 2023-09-08 ENCOUNTER — INITIAL PRENATAL (OUTPATIENT)
Dept: OBGYN CLINIC | Facility: CLINIC | Age: 33
End: 2023-09-08

## 2023-09-08 VITALS — WEIGHT: 275.2 LBS | BODY MASS INDEX: 39.49 KG/M2

## 2023-09-08 DIAGNOSIS — O99.341 DEPRESSION DURING PREGNANCY IN FIRST TRIMESTER: ICD-10-CM

## 2023-09-08 DIAGNOSIS — F32.A DEPRESSION DURING PREGNANCY IN FIRST TRIMESTER: ICD-10-CM

## 2023-09-08 DIAGNOSIS — Z11.3 ROUTINE SCREENING FOR STI (SEXUALLY TRANSMITTED INFECTION): ICD-10-CM

## 2023-09-08 DIAGNOSIS — O99.210 OBESITY IN PREGNANCY: ICD-10-CM

## 2023-09-08 DIAGNOSIS — Z12.4 SCREENING FOR CERVICAL CANCER: ICD-10-CM

## 2023-09-08 DIAGNOSIS — Z87.59 HISTORY OF PLACENTA ABRUPTION: ICD-10-CM

## 2023-09-08 DIAGNOSIS — Z3A.12 12 WEEKS GESTATION OF PREGNANCY: ICD-10-CM

## 2023-09-08 DIAGNOSIS — O34.219 PREVIOUS CESAREAN SECTION COMPLICATING PREGNANCY: Primary | ICD-10-CM

## 2023-09-08 PROCEDURE — 87591 N.GONORRHOEAE DNA AMP PROB: CPT | Performed by: NURSE PRACTITIONER

## 2023-09-08 PROCEDURE — G0145 SCR C/V CYTO,THINLAYER,RESCR: HCPCS | Performed by: NURSE PRACTITIONER

## 2023-09-08 PROCEDURE — G0476 HPV COMBO ASSAY CA SCREEN: HCPCS | Performed by: NURSE PRACTITIONER

## 2023-09-08 PROCEDURE — PNV: Performed by: NURSE PRACTITIONER

## 2023-09-08 PROCEDURE — 87491 CHLMYD TRACH DNA AMP PROBE: CPT | Performed by: NURSE PRACTITIONER

## 2023-09-08 RX ORDER — ASPIRIN 81 MG/1
162 TABLET, CHEWABLE ORAL DAILY
Qty: 60 TABLET | Refills: 5 | Status: SHIPPED | OUTPATIENT
Start: 2023-09-08 | End: 2024-02-24

## 2023-09-10 LAB
C TRACH DNA SPEC QL NAA+PROBE: NEGATIVE
N GONORRHOEA DNA SPEC QL NAA+PROBE: NEGATIVE

## 2023-09-11 LAB
HPV HR 12 DNA CVX QL NAA+PROBE: NEGATIVE
HPV16 DNA CVX QL NAA+PROBE: NEGATIVE
HPV18 DNA CVX QL NAA+PROBE: NEGATIVE

## 2023-09-12 ENCOUNTER — ROUTINE PRENATAL (OUTPATIENT)
Dept: PERINATAL CARE | Facility: CLINIC | Age: 33
End: 2023-09-12
Payer: COMMERCIAL

## 2023-09-12 VITALS
HEIGHT: 70 IN | WEIGHT: 278.8 LBS | BODY MASS INDEX: 39.91 KG/M2 | SYSTOLIC BLOOD PRESSURE: 116 MMHG | DIASTOLIC BLOOD PRESSURE: 78 MMHG | HEART RATE: 101 BPM

## 2023-09-12 DIAGNOSIS — O99.210 OBESITY IN PREGNANCY: ICD-10-CM

## 2023-09-12 DIAGNOSIS — O09.899 HISTORY OF PRETERM DELIVERY, CURRENTLY PREGNANT: ICD-10-CM

## 2023-09-12 DIAGNOSIS — Z36.82 ENCOUNTER FOR ANTENATAL SCREENING FOR NUCHAL TRANSLUCENCY: Primary | ICD-10-CM

## 2023-09-12 DIAGNOSIS — Z34.81 PRENATAL CARE, SUBSEQUENT PREGNANCY, FIRST TRIMESTER: ICD-10-CM

## 2023-09-12 DIAGNOSIS — O34.219 PREVIOUS CESAREAN SECTION COMPLICATING PREGNANCY: ICD-10-CM

## 2023-09-12 PROCEDURE — 76801 OB US < 14 WKS SINGLE FETUS: CPT | Performed by: OBSTETRICS & GYNECOLOGY

## 2023-09-12 PROCEDURE — 76813 OB US NUCHAL MEAS 1 GEST: CPT | Performed by: OBSTETRICS & GYNECOLOGY

## 2023-09-12 NOTE — PROGRESS NOTES
Patient chose to have [facility] Non-invasive Prenatal Screen with fetal sex. Patient given brochure and is aware [facility] will contact their insurance and coordinate coverage. Patient made aware she will need to respond to text message or e-mail from [address] within 2 business days or testing will be run through insurance. Patient informed text message will come from area code  "415". Provided [employer] # [phone] and web site : [email or web address].   "[place] your test online" card with barcode and test tube ID provided to patient. Reviewed [employer]'s web site states 5-7 business days for results via their portal.   [employer] message will be sent to patient when [facility] receives results /provider reviews. 2 vials of blood drawn from  left  arm by Maikol MYRICK MA. Patient tolerated blood draw without difficulty. Specimens labeled with patient identifiers (name, date of birth, specimen collection date), order and specimen were verified with patient, packed and sent via [address]. FED EX  tracking #  [id number]  Copy of lab order scanned to Epic media. [facility] will have results in approximately 7-10 business days and will call patient or notify via [address] [place] [address]. Patient aware viewing lab result online will reveal fetal sex if ordered. Patient verbalized understanding of all instructions and no questions at this time.

## 2023-09-12 NOTE — PATIENT INSTRUCTIONS
Thank you for choosing us for your  care today. If you have any questions about your ultrasound or care, please do not hesitate to contact us or your primary obstetrician. Some general instructions for your pregnancy are:    Exercise: Aim for 22 minutes per day (150 minutes per week) of regular exercise. Walking is great! Nutrition: Choose healthy sources of calcium, iron, and protein. Learn about Preeclampsia: preeclampsia is a common, serious high blood pressure complication in pregnancy. A blood pressure of 959AMDP (systolic or top number) or 11FLQF (diastolic or bottom number) is not normal and needs evaluation by your doctor. Aspirin is sometimes prescribed in early pregnancy to prevent preeclampsia in women with risk factors - ask your obstetrician if you should be on this medication. For more resources, visit:  [employer]  If you smoke, try to reduce how many cigarettes you smoke or try to quit completely. Do not vape. Other warning signs to watch out for in pregnancy or postpartum: chest pain, obstructed breathing or shortness of breath, seizures, thoughts of hurting yourself or your baby, bleeding, a painful or swollen leg, fever, or headache (see [facility] POST-BIRTH Warning Signs campaign). If these happen call 911. Itching is also not normal in pregnancy and if you experience this, especially over your hands and feet, potentially worse at night, notify your doctors. Include Pregnancy/Lactation Warning?: No Glycopyrrolate Counseling:  I discussed with the patient the risks of glycopyrrolate including but not limited to skin rash, drowsiness, dry mouth, difficulty urinating, and blurred vision. Terbinafine Counseling: Patient counseling regarding adverse effects of terbinafine including but not limited to headache, diarrhea, rash, upset stomach, liver function test abnormalities, itching, taste/smell disturbance, nausea, abdominal pain, and flatulence.  There is a rare possibility of liver failure that can occur when taking terbinafine.  The patient understands that a baseline LFT and kidney function test may be required. The patient verbalized understanding of the proper use and possible adverse effects of terbinafine.  All of the patient's questions and concerns were addressed. Sarecycline Pregnancy And Lactation Text: This medication is Pregnancy Category D and not consider safe during pregnancy. It is also excreted in breast milk. Rhofade Counseling: Rhofade is a topical medication which can decrease superficial blood flow where applied. Side effects are uncommon and include stinging, redness and allergic reactions. Tranexamic Acid Counseling:  Patient advised of the small risk of bleeding problems with tranexamic acid. They were also instructed to call if they developed any nausea, vomiting or diarrhea. All of the patient's questions and concerns were addressed. Simponi Pregnancy And Lactation Text: The risk during pregnancy and breastfeeding is uncertain with this medication. Cyclophosphamide Pregnancy And Lactation Text: This medication is Pregnancy Category D and it isn't considered safe during pregnancy. This medication is excreted in breast milk. Azelaic Acid Counseling: Patient counseled that medicine may cause skin irritation and to avoid applying near the eyes.  In the event of skin irritation, the patient was advised to reduce the amount of the drug applied or use it less frequently.   The patient verbalized understanding of the proper use and possible adverse effects of azelaic acid.  All of the patient's questions and concerns were addressed. Nsaids Pregnancy And Lactation Text: These medications are considered safe up to 30 weeks gestation. It is excreted in breast milk. Topical Clindamycin Pregnancy And Lactation Text: This medication is Pregnancy Category B and is considered safe during pregnancy. It is unknown if it is excreted in breast milk. Skyrizi Counseling: I discussed with the patient the risks of risankizumab-rzaa including but not limited to immunosuppression, and serious infections.  The patient understands that monitoring is required including a PPD at baseline and must alert us or the primary physician if symptoms of infection or other concerning signs are noted. Colchicine Pregnancy And Lactation Text: This medication is Pregnancy Category C and isn't considered safe during pregnancy. It is excreted in breast milk. Cyclosporine Counseling:  I discussed with the patient the risks of cyclosporine including but not limited to hypertension, gingival hyperplasia,myelosuppression, immunosuppression, liver damage, kidney damage, neurotoxicity, lymphoma, and serious infections. The patient understands that monitoring is required including baseline blood pressure, CBC, CMP, lipid panel and uric acid, and then 1-2 times monthly CMP and blood pressure. Drysol Counseling:  I discussed with the patient the risks of drysol/aluminum chloride including but not limited to skin rash, itching, irritation, burning. Odomzo Counseling- I discussed with the patient the risks of Odomzo including but not limited to nausea, vomiting, diarrhea, constipation, weight loss, changes in the sense of taste, decreased appetite, muscle spasms, and hair loss.  The patient verbalized understanding of the proper use and possible adverse effects of Odomzo.  All of the patient's questions and concerns were addressed. Metronidazole Counseling:  I discussed with the patient the risks of metronidazole including but not limited to seizures, nausea/vomiting, a metallic taste in the mouth, nausea/vomiting and severe allergy. Imiquimod Pregnancy And Lactation Text: This medication is Pregnancy Category C. It is unknown if this medication is excreted in breast milk. Dapsone Pregnancy And Lactation Text: This medication is Pregnancy Category C and is not considered safe during pregnancy or breast feeding. Propranolol Pregnancy And Lactation Text: This medication is Pregnancy Category C and it isn't known if it is safe during pregnancy. It is excreted in breast milk. Xelwesleyz Pregnancy And Lactation Text: This medication is Pregnancy Category D and is not considered safe during pregnancy.  The risk during breast feeding is also uncertain. Bactrim Pregnancy And Lactation Text: This medication is Pregnancy Category D and is known to cause fetal risk.  It is also excreted in breast milk. Cosentyx Counseling:  I discussed with the patient the risks of Cosentyx including but not limited to worsening of Crohn's disease, immunosuppression, allergic reactions and infections.  The patient understands that monitoring is required including a PPD at baseline and must alert us or the primary physician if symptoms of infection or other concerning signs are noted. Glycopyrrolate Pregnancy And Lactation Text: This medication is Pregnancy Category B and is considered safe during pregnancy. It is unknown if it is excreted breast milk. Minoxidil Counseling: Minoxidil is a topical medication which can increase blood flow where it is applied. It is uncertain how this medication increases hair growth. Side effects are uncommon and include stinging and allergic reactions. Tranexamic Acid Pregnancy And Lactation Text: It is unknown if this medication is safe during pregnancy or breast feeding. Tetracycline Counseling: Patient counseled regarding possible photosensitivity and increased risk for sunburn.  Patient instructed to avoid sunlight, if possible.  When exposed to sunlight, patients should wear protective clothing, sunglasses, and sunscreen.  The patient was instructed to call the office immediately if the following severe adverse effects occur:  hearing changes, easy bruising/bleeding, severe headache, or vision changes.  The patient verbalized understanding of the proper use and possible adverse effects of tetracycline.  All of the patient's questions and concerns were addressed. Patient understands to avoid pregnancy while on therapy due to potential birth defects. Birth Control Pills Counseling: Birth Control Pill Counseling: I discussed with the patient the potential side effects of OCPs including but not limited to increased risk of stroke, heart attack, thrombophlebitis, deep venous thrombosis, hepatic adenomas, breast changes, GI upset, headaches, and depression.  The patient verbalized understanding of the proper use and possible adverse effects of OCPs. All of the patient's questions and concerns were addressed. Infliximab Counseling:  I discussed with the patient the risks of infliximab including but not limited to myelosuppression, immunosuppression, autoimmune hepatitis, demyelinating diseases, lymphoma, and serious infections.  The patient understands that monitoring is required including a PPD at baseline and must alert us or the primary physician if symptoms of infection or other concerning signs are noted. Metronidazole Pregnancy And Lactation Text: This medication is Pregnancy Category B and considered safe during pregnancy.  It is also excreted in breast milk. Dutasteride Male Counseling: Dustasteride Counseling:  I discussed with the patient the risks of use of dutasteride including but not limited to decreased libido, decreased ejaculate volume, and gynecomastia. Women who can become pregnant should not handle medication.  All of the patient's questions and concerns were addressed. Topical Ketoconazole Counseling: Patient counseled that this medication may cause skin irritation or allergic reactions.  In the event of skin irritation, the patient was advised to reduce the amount of the drug applied or use it less frequently.   The patient verbalized understanding of the proper use and possible adverse effects of ketoconazole.  All of the patient's questions and concerns were addressed. Azelaic Acid Pregnancy And Lactation Text: This medication is considered safe during pregnancy and breast feeding. Albendazole Counseling:  I discussed with the patient the risks of albendazole including but not limited to cytopenia, kidney damage, nausea/vomiting and severe allergy.  The patient understands that this medication is being used in an off-label manner. Rhofade Pregnancy And Lactation Text: This medication has not been assigned a Pregnancy Risk Category. It is unknown if the medication is excreted in breast milk. Odomzo Pregnancy And Lactation Text: This medication is Pregnancy Category X and is absolutely contraindicated during pregnancy. It is unknown if it is excreted in breast milk. Cyclosporine Pregnancy And Lactation Text: This medication is Pregnancy Category C and it isn't know if it is safe during pregnancy. This medication is excreted in breast milk. Opioid Counseling: I discussed with the patient the potential side effects of opioids including but not limited to addiction, altered mental status, and depression. I stressed avoiding alcohol, benzodiazepines, muscle relaxants and sleep aids unless specifically okayed by a physician. The patient verbalized understanding of the proper use and possible adverse effects of opioids. All of the patient's questions and concerns were addressed. They were instructed to flush the remaining pills down the toilet if they did not need them for pain. Albendazole Pregnancy And Lactation Text: This medication is Pregnancy Category C and it isn't known if it is safe during pregnancy. It is also excreted in breast milk. Benzoyl Peroxide Counseling: Patient counseled that medicine may cause skin irritation and bleach clothing.  In the event of skin irritation, the patient was advised to reduce the amount of the drug applied or use it less frequently.   The patient verbalized understanding of the proper use and possible adverse effects of benzoyl peroxide.  All of the patient's questions and concerns were addressed. Cephalexin Counseling: I counseled the patient regarding use of cephalexin as an antibiotic for prophylactic and/or therapeutic purposes. Cephalexin (commonly prescribed under brand name Keflex) is a cephalosporin antibiotic which is active against numerous classes of bacteria, including most skin bacteria. Side effects may include nausea, diarrhea, gastrointestinal upset, rash, hives, yeast infections, and in rare cases, hepatitis, kidney disease, seizures, fever, confusion, neurologic symptoms, and others. Patients with severe allergies to penicillin medications are cautioned that there is about a 10% incidence of cross-reactivity with cephalosporins. When possible, patients with penicillin allergies should use alternatives to cephalosporins for antibiotic therapy. Xolair Counseling:  Patient informed of potential adverse effects including but not limited to fever, muscle aches, rash and allergic reactions.  The patient verbalized understanding of the proper use and possible adverse effects of Xolair.  All of the patient's questions and concerns were addressed. Cosentyx Pregnancy And Lactation Text: This medication is Pregnancy Category B and is considered safe during pregnancy. It is unknown if this medication is excreted in breast milk. Dutasteride Pregnancy And Lactation Text: This medication is absolutely contraindicated in women, especially during pregnancy and breast feeding. Feminization of male fetuses is possible if taking while pregnant. Bexarotene Counseling:  I discussed with the patient the risks of bexarotene including but not limited to hair loss, dry lips/skin/eyes, liver abnormalities, hyperlipidemia, pancreatitis, depression/suicidal ideation, photosensitivity, drug rash/allergic reactions, hypothyroidism, anemia, leukopenia, infection, cataracts, and teratogenicity.  Patient understands that they will need regular blood tests to check lipid profile, liver function tests, white blood cell count, thyroid function tests and pregnancy test if applicable. Cephalexin Pregnancy And Lactation Text: This medication is Pregnancy Category B and considered safe during pregnancy.  It is also excreted in breast milk but can be used safely for shorter doses. Minoxidil Pregnancy And Lactation Text: This medication has not been assigned a Pregnancy Risk Category but animal studies failed to show danger with the topical medication. It is unknown if the medication is excreted in breast milk. Birth Control Pills Pregnancy And Lactation Text: This medication should be avoided if pregnant and for the first 30 days post-partum. Valtrex Counseling: I discussed with the patient the risks of valacyclovir including but not limited to kidney damage, nausea, vomiting and severe allergy.  The patient understands that if the infection seems to be worsening or is not improving, they are to call. Hydroxychloroquine Counseling:  I discussed with the patient that a baseline ophthalmologic exam is needed at the start of therapy and every year thereafter while on therapy. A CBC may also be warranted for monitoring.  The side effects of this medication were discussed with the patient, including but not limited to agranulocytosis, aplastic anemia, seizures, rashes, retinopathy, and liver toxicity. Patient instructed to call the office should any adverse effect occur.  The patient verbalized understanding of the proper use and possible adverse effects of Plaquenil.  All the patient's questions and concerns were addressed. Solaraze Counseling:  I discussed with the patient the risks of Solaraze including but not limited to erythema, scaling, itching, weeping, crusting, and pain. Minocycline Counseling: Patient advised regarding possible photosensitivity and discoloration of the teeth, skin, lips, tongue and gums.  Patient instructed to avoid sunlight, if possible.  When exposed to sunlight, patients should wear protective clothing, sunglasses, and sunscreen.  The patient was instructed to call the office immediately if the following severe adverse effects occur:  hearing changes, easy bruising/bleeding, severe headache, or vision changes.  The patient verbalized understanding of the proper use and possible adverse effects of minocycline.  All of the patient's questions and concerns were addressed. Terbinafine Pregnancy And Lactation Text: This medication is Pregnancy Category B and is considered safe during pregnancy. It is also excreted in breast milk and breast feeding isn't recommended. Solaraze Pregnancy And Lactation Text: This medication is Pregnancy Category B and is considered safe. There is some data to suggest avoiding during the third trimester. It is unknown if this medication is excreted in breast milk. Opioid Pregnancy And Lactation Text: These medications can lead to premature delivery and should be avoided during pregnancy. These medications are also present in breast milk in small amounts. Methotrexate Counseling:  Patient counseled regarding adverse effects of methotrexate including but not limited to nausea, vomiting, abnormalities in liver function tests. Patients may develop mouth sores, rash, diarrhea, and abnormalities in blood counts. The patient understands that monitoring is required including LFT's and blood counts.  There is a rare possibility of scarring of the liver and lung problems that can occur when taking methotrexate. Persistent nausea, loss of appetite, pale stools, dark urine, cough, and shortness of breath should be reported immediately. Patient advised to discontinue methotrexate treatment at least three months before attempting to become pregnant.  I discussed the need for folate supplements while taking methotrexate.  These supplements can decrease side effects during methotrexate treatment. The patient verbalized understanding of the proper use and possible adverse effects of methotrexate.  All of the patient's questions and concerns were addressed. Hydroxychloroquine Pregnancy And Lactation Text: This medication has been shown to cause fetal harm but it isn't assigned a Pregnancy Risk Category. There are small amounts excreted in breast milk. Valtrex Pregnancy And Lactation Text: this medication is Pregnancy Category B and is considered safe during pregnancy. This medication is not directly found in breast milk but it's metabolite acyclovir is present. Stelara Counseling:  I discussed with the patient the risks of ustekinumab including but not limited to immunosuppression, malignancy, posterior leukoencephalopathy syndrome, and serious infections.  The patient understands that monitoring is required including a PPD at baseline and must alert us or the primary physician if symptoms of infection or other concerning signs are noted. Dupixent Counseling: I discussed with the patient the risks of dupilumab including but not limited to eye infection and irritation, cold sores, injection site reactions, worsening of asthma, allergic reactions and increased risk of parasitic infection.  Live vaccines should be avoided while taking dupilumab. Dupilumab will also interact with certain medications such as warfarin and cyclosporine. The patient understands that monitoring is required and they must alert us or the primary physician if symptoms of infection or other concerning signs are noted. Elidel Counseling: Patient may experience a mild burning sensation during topical application. Elidel is not approved in children less than 2 years of age. There have been case reports of hematologic and skin malignancies in patients using topical calcineurin inhibitors although causality is questionable. Oral Minoxidil Counseling- I discussed with the patient the risks of oral minoxidil including but not limited to shortness of breath, swelling of the feet or ankles, dizziness, lightheadedness, unwanted hair growth and allergic reaction.  The patient verbalized understanding of the proper use and possible adverse effects of oral minoxidil.  All of the patient's questions and concerns were addressed. Xolair Pregnancy And Lactation Text: This medication is Pregnancy Category B and is considered safe during pregnancy. This medication is excreted in breast milk. Benzoyl Peroxide Pregnancy And Lactation Text: This medication is Pregnancy Category C. It is unknown if benzoyl peroxide is excreted in breast milk. Topical Sulfur Applications Counseling: Topical Sulfur Counseling: Patient counseled that this medication may cause skin irritation or allergic reactions.  In the event of skin irritation, the patient was advised to reduce the amount of the drug applied or use it less frequently.   The patient verbalized understanding of the proper use and possible adverse effects of topical sulfur application.  All of the patient's questions and concerns were addressed. Fluconazole Counseling:  Patient counseled regarding adverse effects of fluconazole including but not limited to headache, diarrhea, nausea, upset stomach, liver function test abnormalities, taste disturbance, and stomach pain.  There is a rare possibility of liver failure that can occur when taking fluconazole.  The patient understands that monitoring of LFTs and kidney function test may be required, especially at baseline. The patient verbalized understanding of the proper use and possible adverse effects of fluconazole.  All of the patient's questions and concerns were addressed. Spironolactone Counseling: Patient advised regarding risks of diarrhea, abdominal pain, hyperkalemia, birth defects (for female patients), liver toxicity and renal toxicity. The patient may need blood work to monitor liver and kidney function and potassium levels while on therapy. The patient verbalized understanding of the proper use and possible adverse effects of spironolactone.  All of the patient's questions and concerns were addressed. Bexarotene Pregnancy And Lactation Text: This medication is Pregnancy Category X and should not be given to women who are pregnant or may become pregnant. This medication should not be used if you are breast feeding. Dupixent Pregnancy And Lactation Text: This medication likely crosses the placenta but the risk for the fetus is uncertain. This medication is excreted in breast milk. Oral Minoxidil Pregnancy And Lactation Text: This medication should only be used when clearly needed if you are pregnant, attempting to become pregnant or breast feeding. Clindamycin Counseling: I counseled the patient regarding use of clindamycin as an antibiotic for prophylactic and/or therapeutic purposes. Clindamycin is active against numerous classes of bacteria, including skin bacteria. Side effects may include nausea, diarrhea, gastrointestinal upset, rash, hives, yeast infections, and in rare cases, colitis. Ivermectin Counseling:  Patient instructed to take medication on an empty stomach with a full glass of water.  Patient informed of potential adverse effects including but not limited to nausea, diarrhea, dizziness, itching, and swelling of the extremities or lymph nodes.  The patient verbalized understanding of the proper use and possible adverse effects of ivermectin.  All of the patient's questions and concerns were addressed. Erivedge Counseling- I discussed with the patient the risks of Erivedge including but not limited to nausea, vomiting, diarrhea, constipation, weight loss, changes in the sense of taste, decreased appetite, muscle spasms, and hair loss.  The patient verbalized understanding of the proper use and possible adverse effects of Erivedge.  All of the patient's questions and concerns were addressed. Rituxan Counseling:  I discussed with the patient the risks of Rituxan infusions. Side effects can include infusion reactions, severe drug rashes including mucocutaneous reactions, reactivation of latent hepatitis and other infections and rarely progressive multifocal leukoencephalopathy.  All of the patient's questions and concerns were addressed. Mirvaso Counseling: Mirvaso is a topical medication which can decrease superficial blood flow where applied. Side effects are uncommon and include stinging, redness and allergic reactions. Libtayo Counseling- I discussed with the patient the risks of Libtayo including but not limited to nausea, vomiting, diarrhea, and bone or muscle pain.  The patient verbalized understanding of the proper use and possible adverse effects of Libtayo.  All of the patient's questions and concerns were addressed. Azathioprine Counseling:  I discussed with the patient the risks of azathioprine including but not limited to myelosuppression, immunosuppression, hepatotoxicity, lymphoma, and infections.  The patient understands that monitoring is required including baseline LFTs, Creatinine, possible TPMP genotyping and weekly CBCs for the first month and then every 2 weeks thereafter.  The patient verbalized understanding of the proper use and possible adverse effects of azathioprine.  All of the patient's questions and concerns were addressed. Topical Retinoid counseling:  Patient advised to apply a pea-sized amount only at bedtime and wait 30 minutes after washing their face before applying.  If too drying, patient may add a non-comedogenic moisturizer. The patient verbalized understanding of the proper use and possible adverse effects of retinoids.  All of the patient's questions and concerns were addressed. Topical Sulfur Applications Pregnancy And Lactation Text: This medication is considered safe during pregnancy and breast feeding secondary to limited systemic absorption. Cimetidine Counseling:  I discussed with the patient the risks of Cimetidine including but not limited to gynecomastia, headache, diarrhea, nausea, drowsiness, arrhythmias, pancreatitis, skin rashes, psychosis, bone marrow suppression and kidney toxicity. Carac Counseling:  I discussed with the patient the risks of Carac including but not limited to erythema, scaling, itching, weeping, crusting, and pain. Methotrexate Pregnancy And Lactation Text: This medication is Pregnancy Category X and is known to cause fetal harm. This medication is excreted in breast milk. Wartpeel Counseling:  I discussed with the patient the risks of Wartpeel including but not limited to erythema, scaling, itching, weeping, crusting, and pain. Fluconazole Pregnancy And Lactation Text: This medication is Pregnancy Category C and it isn't know if it is safe during pregnancy. It is also excreted in breast milk. Taltz Counseling: I discussed with the patient the risks of ixekizumab including but not limited to immunosuppression, serious infections, worsening of inflammatory bowel disease and drug reactions.  The patient understands that monitoring is required including a PPD at baseline and must alert us or the primary physician if symptoms of infection or other concerning signs are noted. Carac Pregnancy And Lactation Text: This medication is Pregnancy Category X and contraindicated in pregnancy and in women who may become pregnant. It is unknown if this medication is excreted in breast milk. Enbrel Counseling:  I discussed with the patient the risks of etanercept including but not limited to myelosuppression, immunosuppression, autoimmune hepatitis, demyelinating diseases, lymphoma, and infections.  The patient understands that monitoring is required including a PPD at baseline and must alert us or the primary physician if symptoms of infection or other concerning signs are noted. Clindamycin Pregnancy And Lactation Text: This medication can be used in pregnancy if certain situations. Clindamycin is also present in breast milk. Prednisone Counseling:  I discussed with the patient the risks of prolonged use of prednisone including but not limited to weight gain, insomnia, osteoporosis, mood changes, diabetes, susceptibility to infection, glaucoma and high blood pressure.  In cases where prednisone use is prolonged, patients should be monitored with blood pressure checks, serum glucose levels and an eye exam.  Additionally, the patient may need to be placed on GI prophylaxis, PCP prophylaxis, and calcium and vitamin D supplementation and/or a bisphosphonate.  The patient verbalized understanding of the proper use and the possible adverse effects of prednisone.  All of the patient's questions and concerns were addressed. Otezla Counseling: The side effects of Otezla were discussed with the patient, including but not limited to worsening or new depression, weight loss, diarrhea, nausea, upper respiratory tract infection, and headache. Patient instructed to call the office should any adverse effect occur.  The patient verbalized understanding of the proper use and possible adverse effects of Otezla.  All the patient's questions and concerns were addressed. Quinolones Counseling:  I discussed with the patient the risks of fluoroquinolones including but not limited to GI upset, allergic reaction, drug rash, diarrhea, dizziness, photosensitivity, yeast infections, liver function test abnormalities, tendonitis/tendon rupture. Isotretinoin Counseling: Patient should get monthly blood tests, not donate blood, not drive at night if vision affected, not share medication, and not undergo elective surgery for 6 months after tx completed. Side effects reviewed, pt to contact office should one occur. Rituxan Pregnancy And Lactation Text: This medication is Pregnancy Category C and it isn't know if it is safe during pregnancy. It is unknown if this medication is excreted in breast milk but similar antibodies are known to be excreted. Spironolactone Pregnancy And Lactation Text: This medication can cause feminization of the male fetus and should be avoided during pregnancy. The active metabolite is also found in breast milk. Eucrisa Counseling: Patient may experience a mild burning sensation during topical application. Eucrisa is not approved in children less than 2 years of age. Siliq Counseling:  I discussed with the patient the risks of Siliq including but not limited to new or worsening depression, suicidal thoughts and behavior, immunosuppression, malignancy, posterior leukoencephalopathy syndrome, and serious infections.  The patient understands that monitoring is required including a PPD at baseline and must alert us or the primary physician if symptoms of infection or other concerning signs are noted. There is also a special program designed to monitor depression which is required with Siliq. SSKI Counseling:  I discussed with the patient the risks of SSKI including but not limited to thyroid abnormalities, metallic taste, GI upset, fever, headache, acne, arthralgias, paraesthesias, lymphadenopathy, easy bleeding, arrhythmias, and allergic reaction. Picato Counseling:  I discussed with the patient the risks of Picato including but not limited to erythema, scaling, itching, weeping, crusting, and pain. Finasteride Male Counseling: Finasteride Counseling:  I discussed with the patient the risks of use of finasteride including but not limited to decreased libido, decreased ejaculate volume, gynecomastia, and depression. Women should not handle medication.  All of the patient's questions and concerns were addressed. Arava Counseling:  Patient counseled regarding adverse effects of Arava including but not limited to nausea, vomiting, abnormalities in liver function tests. Patients may develop mouth sores, rash, diarrhea, and abnormalities in blood counts. The patient understands that monitoring is required including LFTs and blood counts.  There is a rare possibility of scarring of the liver and lung problems that can occur when taking methotrexate. Persistent nausea, loss of appetite, pale stools, dark urine, cough, and shortness of breath should be reported immediately. Patient advised to discontinue Arava treatment and consult with a physician prior to attempting conception. The patient will have to undergo a treatment to eliminate Arava from the body prior to conception. Azathioprine Pregnancy And Lactation Text: This medication is Pregnancy Category D and isn't considered safe during pregnancy. It is unknown if this medication is excreted in breast milk. Libtayo Pregnancy And Lactation Text: This medication is contraindicated in pregnancy and when breast feeding. Azithromycin Counseling:  I discussed with the patient the risks of azithromycin including but not limited to GI upset, allergic reaction, drug rash, diarrhea, and yeast infections. Otezla Pregnancy And Lactation Text: This medication is Pregnancy Category C and it isn't known if it is safe during pregnancy. It is unknown if it is excreted in breast milk. Cellcept Counseling:  I discussed with the patient the risks of mycophenolate mofetil including but not limited to infection/immunosuppression, GI upset, hypokalemia, hypercholesterolemia, bone marrow suppression, lymphoproliferative disorders, malignancy, GI ulceration/bleed/perforation, colitis, interstitial lung disease, kidney failure, progressive multifocal leukoencephalopathy, and birth defects.  The patient understands that monitoring is required including a baseline creatinine and regular CBC testing. In addition, patient must alert us immediately if symptoms of infection or other concerning signs are noted. Niacinamide Counseling: I recommended taking niacin or niacinamide, also know as vitamin B3, twice daily. Recent evidence suggests that taking vitamin B3 (500 mg twice daily) can reduce the risk of actinic keratoses and non-melanoma skin cancers. Side effects of vitamin B3 include flushing and headache. Calcipotriene Counseling:  I discussed with the patient the risks of calcipotriene including but not limited to erythema, scaling, itching, and irritation. Doxycycline Counseling:  Patient counseled regarding possible photosensitivity and increased risk for sunburn.  Patient instructed to avoid sunlight, if possible.  When exposed to sunlight, patients should wear protective clothing, sunglasses, and sunscreen.  The patient was instructed to call the office immediately if the following severe adverse effects occur:  hearing changes, easy bruising/bleeding, severe headache, or vision changes.  The patient verbalized understanding of the proper use and possible adverse effects of doxycycline.  All of the patient's questions and concerns were addressed. Griseofulvin Counseling:  I discussed with the patient the risks of griseofulvin including but not limited to photosensitivity, cytopenia, liver damage, nausea/vomiting and severe allergy.  The patient understands that this medication is best absorbed when taken with a fatty meal (e.g., ice cream or french fries). Isotretinoin Pregnancy And Lactation Text: This medication is Pregnancy Category X and is considered extremely dangerous during pregnancy. It is unknown if it is excreted in breast milk. Doxepin Counseling:  Patient advised that the medication is sedating and not to drive a car after taking this medication. Patient informed of potential adverse effects including but not limited to dry mouth, urinary retention, and blurry vision.  The patient verbalized understanding of the proper use and possible adverse effects of doxepin.  All of the patient's questions and concerns were addressed. Finasteride Pregnancy And Lactation Text: This medication is absolutely contraindicated during pregnancy. It is unknown if it is excreted in breast milk. Sski Pregnancy And Lactation Text: This medication is Pregnancy Category D and isn't considered safe during pregnancy. It is excreted in breast milk. Rifampin Counseling: I discussed with the patient the risks of rifampin including but not limited to liver damage, kidney damage, red-orange body fluids, nausea/vomiting and severe allergy. High Dose Vitamin A Counseling: Side effects reviewed, pt to contact office should one occur. Humira Counseling:  I discussed with the patient the risks of adalimumab including but not limited to myelosuppression, immunosuppression, autoimmune hepatitis, demyelinating diseases, lymphoma, and serious infections.  The patient understands that monitoring is required including a PPD at baseline and must alert us or the primary physician if symptoms of infection or other concerning signs are noted. Doxycycline Pregnancy And Lactation Text: This medication is Pregnancy Category D and not consider safe during pregnancy. It is also excreted in breast milk but is considered safe for shorter treatment courses. Tazorac Counseling:  Patient advised that medication is irritating and drying.  Patient may need to apply sparingly and wash off after an hour before eventually leaving it on overnight.  The patient verbalized understanding of the proper use and possible adverse effects of tazorac.  All of the patient's questions and concerns were addressed. Doxepin Pregnancy And Lactation Text: This medication is Pregnancy Category C and it isn't known if it is safe during pregnancy. It is also excreted in breast milk and breast feeding isn't recommended. Niacinamide Pregnancy And Lactation Text: These medications are considered safe during pregnancy. Clofazimine Counseling:  I discussed with the patient the risks of clofazimine including but not limited to skin and eye pigmentation, liver damage, nausea/vomiting, gastrointestinal bleeding and allergy. Tazorac Pregnancy And Lactation Text: This medication is not safe during pregnancy. It is unknown if this medication is excreted in breast milk. Tremfya Counseling: I discussed with the patient the risks of guselkumab including but not limited to immunosuppression, serious infections, and drug reactions.  The patient understands that monitoring is required including a PPD at baseline and must alert us or the primary physician if symptoms of infection or other concerning signs are noted. Hydroquinone Counseling:  Patient advised that medication may result in skin irritation, lightening (hypopigmentation), dryness, and burning.  In the event of skin irritation, the patient was advised to reduce the amount of the drug applied or use it less frequently.  Rarely, spots that are treated with hydroquinone can become darker (pseudoochronosis).  Should this occur, patient instructed to stop medication and call the office. The patient verbalized understanding of the proper use and possible adverse effects of hydroquinone.  All of the patient's questions and concerns were addressed. Oxybutynin Counseling:  I discussed with the patient the risks of oxybutynin including but not limited to skin rash, drowsiness, dry mouth, difficulty urinating, and blurred vision. Winlevi Counseling:  I discussed with the patient the risks of topical clascoterone including but not limited to erythema, scaling, itching, and stinging. Patient voiced their understanding. Calcipotriene Pregnancy And Lactation Text: This medication has not been proven safe during pregnancy. It is unknown if this medication is excreted in breast milk. Griseofulvin Pregnancy And Lactation Text: This medication is Pregnancy Category X and is known to cause serious birth defects. It is unknown if this medication is excreted in breast milk but breast feeding should be avoided. High Dose Vitamin A Pregnancy And Lactation Text: High dose vitamin A therapy is contraindicated during pregnancy and breast feeding. Erythromycin Counseling:  I discussed with the patient the risks of erythromycin including but not limited to GI upset, allergic reaction, drug rash, diarrhea, increase in liver enzymes, and yeast infections. Itraconazole Counseling:  I discussed with the patient the risks of itraconazole including but not limited to liver damage, nausea/vomiting, neuropathy, and severe allergy.  The patient understands that this medication is best absorbed when taken with acidic beverages such as non-diet cola or ginger ale.  The patient understands that monitoring is required including baseline LFTs and repeat LFTs at intervals.  The patient understands that they are to contact us or the primary physician if concerning signs are noted. Rifampin Pregnancy And Lactation Text: This medication is Pregnancy Category C and it isn't know if it is safe during pregnancy. It is also excreted in breast milk and should not be used if you are breast feeding. Acitretin Counseling:  I discussed with the patient the risks of acitretin including but not limited to hair loss, dry lips/skin/eyes, liver damage, hyperlipidemia, depression/suicidal ideation, photosensitivity.  Serious rare side effects can include but are not limited to pancreatitis, pseudotumor cerebri, bony changes, clot formation/stroke/heart attack.  Patient understands that alcohol is contraindicated since it can result in liver toxicity and significantly prolong the elimination of the drug by many years. Gabapentin Counseling: I discussed with the patient the risks of gabapentin including but not limited to dizziness, somnolence, fatigue and ataxia. Detail Level: Detailed Simponi Counseling:  I discussed with the patient the risks of golimumab including but not limited to myelosuppression, immunosuppression, autoimmune hepatitis, demyelinating diseases, lymphoma, and serious infections.  The patient understands that monitoring is required including a PPD at baseline and must alert us or the primary physician if symptoms of infection or other concerning signs are noted. Thalidomide Counseling: I discussed with the patient the risks of thalidomide including but not limited to birth defects, anxiety, weakness, chest pain, dizziness, cough and severe allergy. Protopic Counseling: Patient may experience a mild burning sensation during topical application. Protopic is not approved in children less than 2 years of age. There have been case reports of hematologic and skin malignancies in patients using topical calcineurin inhibitors although causality is questionable. Ketoconazole Counseling:   Patient counseled regarding improving absorption with orange juice.  Adverse effects include but are not limited to breast enlargement, headache, diarrhea, nausea, upset stomach, liver function test abnormalities, taste disturbance, and stomach pain.  There is a rare possibility of liver failure that can occur when taking ketoconazole. The patient understands that monitoring of LFTs may be required, especially at baseline. The patient verbalized understanding of the proper use and possible adverse effects of ketoconazole.  All of the patient's questions and concerns were addressed. Nsaids Counseling: NSAID Counseling: I discussed with the patient that NSAIDs should be taken with food. Prolonged use of NSAIDs can result in the development of stomach ulcers.  Patient advised to stop taking NSAIDs if abdominal pain occurs.  The patient verbalized understanding of the proper use and possible adverse effects of NSAIDs.  All of the patient's questions and concerns were addressed. Cyclophosphamide Counseling:  I discussed with the patient the risks of cyclophosphamide including but not limited to hair loss, hormonal abnormalities, decreased fertility, abdominal pain, diarrhea, nausea and vomiting, bone marrow suppression and infection. The patient understands that monitoring is required while taking this medication. Sarecycline Counseling: Patient advised regarding possible photosensitivity and discoloration of the teeth, skin, lips, tongue and gums.  Patient instructed to avoid sunlight, if possible.  When exposed to sunlight, patients should wear protective clothing, sunglasses, and sunscreen.  The patient was instructed to call the office immediately if the following severe adverse effects occur:  hearing changes, easy bruising/bleeding, severe headache, or vision changes.  The patient verbalized understanding of the proper use and possible adverse effects of sarecycline.  All of the patient's questions and concerns were addressed. Azithromycin Pregnancy And Lactation Text: This medication is considered safe during pregnancy and is also secreted in breast milk. Hydroxyzine Counseling: Patient advised that the medication is sedating and not to drive a car after taking this medication.  Patient informed of potential adverse effects including but not limited to dry mouth, urinary retention, and blurry vision.  The patient verbalized understanding of the proper use and possible adverse effects of hydroxyzine.  All of the patient's questions and concerns were addressed. Winlevi Pregnancy And Lactation Text: This medication is considered safe during pregnancy and breastfeeding. Cimzia Counseling:  I discussed with the patient the risks of Cimzia including but not limited to immunosuppression, allergic reactions and infections.  The patient understands that monitoring is required including a PPD at baseline and must alert us or the primary physician if symptoms of infection or other concerning signs are noted. 5-Fu Counseling: 5-Fluorouracil Counseling:  I discussed with the patient the risks of 5-fluorouracil including but not limited to erythema, scaling, itching, weeping, crusting, and pain. Topical Clindamycin Counseling: Patient counseled that this medication may cause skin irritation or allergic reactions.  In the event of skin irritation, the patient was advised to reduce the amount of the drug applied or use it less frequently.   The patient verbalized understanding of the proper use and possible adverse effects of clindamycin.  All of the patient's questions and concerns were addressed. Dapsone Counseling: I discussed with the patient the risks of dapsone including but not limited to hemolytic anemia, agranulocytosis, rashes, methemoglobinemia, kidney failure, peripheral neuropathy, headaches, GI upset, and liver toxicity.  Patients who start dapsone require monitoring including baseline LFTs and weekly CBCs for the first month, then every month thereafter.  The patient verbalized understanding of the proper use and possible adverse effects of dapsone.  All of the patient's questions and concerns were addressed. Xeljanz Counseling: I discussed with the patient the risks of Xeljanz therapy including increased risk of infection, liver issues, headache, diarrhea, or cold symptoms. Live vaccines should be avoided. They were instructed to call if they have any problems. Propranolol Counseling:  I discussed with the patient the risks of propranolol including but not limited to low heart rate, low blood pressure, low blood sugar, restlessness and increased cold sensitivity. They should call the office if they experience any of these side effects. Ilumya Counseling: I discussed with the patient the risks of tildrakizumab including but not limited to immunosuppression, malignancy, posterior leukoencephalopathy syndrome, and serious infections.  The patient understands that monitoring is required including a PPD at baseline and must alert us or the primary physician if symptoms of infection or other concerning signs are noted. Colchicine Counseling:  Patient counseled regarding adverse effects including but not limited to stomach upset (nausea, vomiting, stomach pain, or diarrhea).  Patient instructed to limit alcohol consumption while taking this medication.  Colchicine may reduce blood counts especially with prolonged use.  The patient understands that monitoring of kidney function and blood counts may be required, especially at baseline. The patient verbalized understanding of the proper use and possible adverse effects of colchicine.  All of the patient's questions and concerns were addressed. Erythromycin Pregnancy And Lactation Text: This medication is Pregnancy Category B and is considered safe during pregnancy. It is also excreted in breast milk. Cimzia Pregnancy And Lactation Text: This medication crosses the placenta but can be considered safe in certain situations. Cimzia may be excreted in breast milk. Hydroxyzine Pregnancy And Lactation Text: This medication is not safe during pregnancy and should not be taken. It is also excreted in breast milk and breast feeding isn't recommended. Bactrim Counseling:  I discussed with the patient the risks of sulfa antibiotics including but not limited to GI upset, allergic reaction, drug rash, diarrhea, dizziness, photosensitivity, and yeast infections.  Rarely, more serious reactions can occur including but not limited to aplastic anemia, agranulocytosis, methemoglobinemia, blood dyscrasias, liver or kidney failure, lung infiltrates or desquamative/blistering drug rashes. Zyclara Counseling:  I discussed with the patient the risks of imiquimod including but not limited to erythema, scaling, itching, weeping, crusting, and pain.  Patient understands that the inflammatory response to imiquimod is variable from person to person and was educated regarded proper titration schedule.  If flu-like symptoms develop, patient knows to discontinue the medication and contact us. Ketoconazole Pregnancy And Lactation Text: This medication is Pregnancy Category C and it isn't know if it is safe during pregnancy. It is also excreted in breast milk and breast feeding isn't recommended. Protopic Pregnancy And Lactation Text: This medication is Pregnancy Category C. It is unknown if this medication is excreted in breast milk when applied topically. Acitretin Pregnancy And Lactation Text: This medication is Pregnancy Category X and should not be given to women who are pregnant or may become pregnant in the future. This medication is excreted in breast milk. Imiquimod Counseling:  I discussed with the patient the risks of imiquimod including but not limited to erythema, scaling, itching, weeping, crusting, and pain.  Patient understands that the inflammatory response to imiquimod is variable from person to person and was educated regarded proper titration schedule.  If flu-like symptoms develop, patient knows to discontinue the medication and contact us.

## 2023-09-12 NOTE — LETTER
Date: 2023    Liliane Kimble MD   [address]    Patient: Dieter Napoles   YOB: 1990   Date of Visit: 2023   Gestational age 17w11d   Felipe Monique of this communication: Routine       Dear Jayant Bhakta,    This patient was seen recently in our  office. Please see ultrasound report under "OB Procedures" tab. Please don't hesitate to contact our office with any concerns or questions.       Sincerely,      Esme Quinones MD  Attending Physician, [address] [place] [address]

## 2023-09-14 NOTE — PROGRESS NOTES
[id number]  [facility]: Ms. Todd Lindsay was seen today for nuchal translucency ultrasound. See ultrasound report under "OB Procedures" tab. MDM:   I. Diagnoses/Problems addressed:  Stable chronic illness: BMI>30  II. Data: I ordered the following tests: NIPS. III. Risk of morbidity: Moderate    Please don't hesitate to contact our office with any concerns or questions.   Kathrin Lee MD

## 2023-09-15 LAB
LAB AP GYN PRIMARY INTERPRETATION: NORMAL
Lab: NORMAL

## 2023-09-20 ENCOUNTER — TELEPHONE (OUTPATIENT)
Dept: PERINATAL CARE | Facility: OTHER | Age: 33
End: 2023-09-20

## 2023-09-20 DIAGNOSIS — Z34.92 SECOND TRIMESTER PREGNANCY: Primary | ICD-10-CM

## 2023-09-20 NOTE — TELEPHONE ENCOUNTER
----- Message from Kathrin Lee MD sent at 2023  1:26 PM EDT -----  Hi  RN staff, I've reviewed this NIPS result which shows low residual risk for the conditions tested (trisomies 13, 18, and 21, and sex chromosome abnormality), can you call her to inform her of this result if she has not viewed her result via [employer]? Her OB office is to order the MSAFP. Thank you.   Kathrin Lee MD

## 2023-09-20 NOTE — TELEPHONE ENCOUNTER
Left voicemail for Quiana Napoles reviewing her non invasive prenatal screening (NIPS) results screened low risk, fetal sex not revealed. Explained if Angel Meléndez would like to know the fetal sex of her baby, encouraged to review test results in mychart. If patient wishes not want to know fetal sex, advised to not open her test result in mychart. Advised there is routine blood screening for spina bifida that is recommended to complete in the second trimester (16-18 weeks) that will be completed through your obstetrician's office. Contact the  office at [phone] with any questions.

## 2023-09-26 ENCOUNTER — OFFICE VISIT (OUTPATIENT)
Dept: FAMILY MEDICINE CLINIC | Facility: HOSPITAL | Age: 33
End: 2023-09-26
Payer: COMMERCIAL

## 2023-09-26 VITALS
WEIGHT: 281.2 LBS | OXYGEN SATURATION: 97 % | HEART RATE: 80 BPM | SYSTOLIC BLOOD PRESSURE: 112 MMHG | TEMPERATURE: 97 F | HEIGHT: 70 IN | DIASTOLIC BLOOD PRESSURE: 60 MMHG | BODY MASS INDEX: 40.26 KG/M2

## 2023-09-26 DIAGNOSIS — F33.0 MILD EPISODE OF RECURRENT MAJOR DEPRESSIVE DISORDER (HCC): Primary | ICD-10-CM

## 2023-09-26 DIAGNOSIS — F98.8 ADD (ATTENTION DEFICIT DISORDER) WITHOUT HYPERACTIVITY: ICD-10-CM

## 2023-09-26 DIAGNOSIS — F41.1 GENERALIZED ANXIETY DISORDER: ICD-10-CM

## 2023-09-26 PROCEDURE — 99213 OFFICE O/P EST LOW 20 MIN: CPT | Performed by: NURSE PRACTITIONER

## 2023-09-26 NOTE — ASSESSMENT & PLAN NOTE
RUDOLPH-7=2  Doing very well. Would like to continue on 50 mg sertraline. Will call with worsening mood. Plan to f/u about 1 month postpartum which should be some time in April.

## 2023-09-26 NOTE — ASSESSMENT & PLAN NOTE
PHQ-9=2  Doing very well. Would like to continue on 50 mg sertraline. Will call with worsening mood. Plan to f/u about 1 month postpartum which should be some time in April.

## 2023-09-26 NOTE — ASSESSMENT & PLAN NOTE
Given pregnancy status she is off stimulant. Some in crease in anxiety with uncontrolled ADHD symptoms but managing.

## 2023-09-26 NOTE — PROGRESS NOTES
Assessment/Plan:    Mild episode of recurrent major depressive disorder (HCC)  PHQ-9=2  Doing very well. Would like to continue on 50 mg sertraline. Will call with worsening mood. Plan to f/u about 1 month postpartum which should be some time in April. Generalized anxiety disorder  RUDOLPH-7=2  Doing very well. Would like to continue on 50 mg sertraline. Will call with worsening mood. Plan to f/u about 1 month postpartum which should be some time in April. ADD (attention deficit disorder) without hyperactivity  Given pregnancy status she is off stimulant. Some in crease in anxiety with uncontrolled ADHD symptoms but managing. Diagnoses and all orders for this visit:    Mild episode of recurrent major depressive disorder ([address])    Generalized anxiety disorder    ADD (attention deficit disorder) without hyperactivity          Subjective:      Patient ID: Tate Guo is a 29 y.o. female. 18 weeks pregnant. On sertraline. Doing well on 50 mg. Anxiety tends to heighten when pregnant. Not being able to take Vyvanse for ADHD makes it worse. Finds vocalizing her anxiety helps. The following portions of the patient's history were reviewed and updated as appropriate: allergies, current medications, past family history, past medical history, past social history, past surgical history and problem list.    Review of Systems   Constitutional: Positive for fatigue. Psychiatric/Behavioral: Positive for decreased concentration. Negative for agitation, dysphoric mood, sleep disturbance and suicidal ideas. The patient is nervous/anxious. Objective:  Vitals:    09/26/23 1840   BP: 112/60   Pulse: 80   Temp: (!) 97 °F (36.1 °C)   SpO2: 97%      Physical Exam  Vitals reviewed. Constitutional:       Appearance: Normal appearance. Cardiovascular:      Rate and Rhythm: Normal rate and regular rhythm. Heart sounds: Normal heart sounds. No murmur heard.   Pulmonary:      Effort: Pulmonary effort is normal.      Breath sounds: Normal breath sounds. Skin:     General: Skin is warm and dry. Neurological:      Mental Status: She is alert and oriented to person, place, and time. Psychiatric:         Mood and Affect: Mood normal.         Behavior: Behavior normal.         Thought Content:  Thought content normal.         Judgment: Judgment normal.

## 2023-10-02 DIAGNOSIS — O99.210 OBESITY IN PREGNANCY: ICD-10-CM

## 2023-10-02 DIAGNOSIS — Z3A.12 12 WEEKS GESTATION OF PREGNANCY: ICD-10-CM

## 2023-10-02 DIAGNOSIS — Z87.59 HISTORY OF PLACENTA ABRUPTION: ICD-10-CM

## 2023-10-02 RX ORDER — ASPIRIN 81 MG
TABLET,CHEWABLE ORAL
Qty: 180 TABLET | Refills: 2 | Status: SHIPPED | OUTPATIENT
Start: 2023-10-02

## 2023-10-03 ENCOUNTER — ROUTINE PRENATAL (OUTPATIENT)
Dept: OBGYN CLINIC | Facility: MEDICAL CENTER | Age: 33
End: 2023-10-03
Payer: COMMERCIAL

## 2023-10-03 ENCOUNTER — ULTRASOUND (OUTPATIENT)
Age: 33
End: 2023-10-03

## 2023-10-03 VITALS
SYSTOLIC BLOOD PRESSURE: 122 MMHG | WEIGHT: 287 LBS | BODY MASS INDEX: 40.18 KG/M2 | DIASTOLIC BLOOD PRESSURE: 78 MMHG | HEIGHT: 71 IN | HEART RATE: 113 BPM

## 2023-10-03 VITALS — BODY MASS INDEX: 40.53 KG/M2 | SYSTOLIC BLOOD PRESSURE: 110 MMHG | DIASTOLIC BLOOD PRESSURE: 76 MMHG | WEIGHT: 282.5 LBS

## 2023-10-03 DIAGNOSIS — O09.899 HISTORY OF PRETERM DELIVERY, CURRENTLY PREGNANT: Primary | ICD-10-CM

## 2023-10-03 DIAGNOSIS — Z3A.16 16 WEEKS GESTATION OF PREGNANCY: ICD-10-CM

## 2023-10-03 DIAGNOSIS — O09.899 HISTORY OF PRETERM DELIVERY, CURRENTLY PREGNANT: ICD-10-CM

## 2023-10-03 DIAGNOSIS — O34.219 PREVIOUS CESAREAN SECTION COMPLICATING PREGNANCY: ICD-10-CM

## 2023-10-03 DIAGNOSIS — B95.1 POSITIVE GBS TEST: ICD-10-CM

## 2023-10-03 DIAGNOSIS — Z34.92 SECOND TRIMESTER PREGNANCY: Primary | ICD-10-CM

## 2023-10-03 DIAGNOSIS — Z23 NEED FOR INFLUENZA VACCINATION: ICD-10-CM

## 2023-10-03 DIAGNOSIS — Z87.59 HISTORY OF PLACENTA ABRUPTION: ICD-10-CM

## 2023-10-03 PROCEDURE — 90686 IIV4 VACC NO PRSV 0.5 ML IM: CPT | Performed by: OBSTETRICS & GYNECOLOGY

## 2023-10-03 PROCEDURE — 90471 IMMUNIZATION ADMIN: CPT | Performed by: OBSTETRICS & GYNECOLOGY

## 2023-10-03 PROCEDURE — PNV: Performed by: OBSTETRICS & GYNECOLOGY

## 2023-10-03 NOTE — PROGRESS NOTES
Ultrasound Probe Disinfection    A transvaginal ultrasound was performed. Prior to use, disinfection was performed with High Level Disinfection Process ([employer]). Probe serial number S1: [id number] was used.       Herminia Arshad  10/03/23  2:05 PM

## 2023-10-03 NOTE — PROGRESS NOTES
Assessment  29 y.o. [id number] at 16w3d presenting for routine prenatal visit. Plan  Diagnoses and all orders for this visit:    Second trimester pregnancy  16 weeks gestation of pregnancy  - Second trimester precautions  - NIPT wnl, AFP pending  - MFM f/u scheduled  - Return in 4wks for PN    History of placenta abruption  History of  delivery, currently pregnant  - Follows with MFM  - s/p 1st trimester progesterone support    Previous  section complicating pregnancy    Positive GBS test    Need for influenza vaccination  -     FLUZONE: influenza vaccine, quadrivalent, 0.5 mL      ____________________________________________________________        Subjective    Sarah Aguilar is a 29 y.o. [id number] at [address] [facility] who presents for routine prenatal visit. She is without complaint. She denies contractions, loss of fluid, or vaginal bleeding. She feels regular fetal movements. Pregnancy Problems:  Patient Active Problem List   Diagnosis    ADD (attention deficit disorder) without hyperactivity    Generalized anxiety disorder    PCOS (polycystic ovarian syndrome)    Mild episode of recurrent major depressive disorder (HCC)    Positive GBS test    Depression during pregnancy in first trimester    History of placenta abruption    Previous  section complicating pregnancy    16 weeks gestation of pregnancy    Obesity in pregnancy    History of  delivery, currently pregnant         Objective  /76   Wt 128 kg (282 lb 8 oz)   LMP 2023 (Approximate)   BMI 40.53 kg/m²     FHT: 155 BPM   Uterine Size: size equals dates     Physical Exam:  Physical Exam  Constitutional:       General: She is not in acute distress. Appearance: Normal appearance. She is well-developed. She is not ill-appearing, toxic-appearing or diaphoretic. HENT:      Head: Normocephalic and atraumatic. Eyes:      General: No scleral icterus. Right eye: No discharge. Left eye: No discharge. Conjunctiva/sclera: Conjunctivae normal.   Pulmonary:      Effort: Pulmonary effort is normal. No accessory muscle usage or respiratory distress. Abdominal:      General: There is distension (gravid). Tenderness: There is no abdominal tenderness. There is no guarding or rebound. Skin:     General: Skin is warm and dry. Coloration: Skin is not jaundiced. Findings: No bruising, erythema or rash. Neurological:      Mental Status: She is alert. Psychiatric:         Mood and Affect: Mood normal.         Behavior: Behavior normal.         Thought Content:  Thought content normal.         Judgment: Judgment normal.

## 2023-10-03 NOTE — PATIENT INSTRUCTIONS
Thank you for choosing us for your  care today. If you have any questions about your ultrasound or care, please do not hesitate to contact us or your primary obstetrician. Some general instructions for your pregnancy are:    Exercise: Aim for 22 minutes per day (150 minutes per week) of regular exercise. Walking is great! Nutrition: Choose healthy sources of calcium, iron, and protein. Learn about Preeclampsia: preeclampsia is a common, serious high blood pressure complication in pregnancy. A blood pressure of 994FHXW (systolic or top number) or 04SYKF (diastolic or bottom number) is not normal and needs evaluation by your doctor. Aspirin is sometimes prescribed in early pregnancy to prevent preeclampsia in women with risk factors - ask your obstetrician if you should be on this medication. For more resources, visit:  MapCoverage.fi  If you smoke, try to reduce how many cigarettes you smoke or try to quit completely. Do not vape. Other warning signs to watch out for in pregnancy or postpartum: chest pain, obstructed breathing or shortness of breath, seizures, thoughts of hurting yourself or your baby, bleeding, a painful or swollen leg, fever, or headache (see AWHONN POST-BIRTH Warning Signs campaign). If these happen call 911. Itching is also not normal in pregnancy and if you experience this, especially over your hands and feet, potentially worse at night, notify your doctors.

## 2023-10-06 NOTE — PROGRESS NOTES
[address] [place] [address] Dr: Ms. Todd Lindsay was seen today for serial cervical length screening ultrasound. See ultrasound report under "OB Procedures" tab. The time spent on this established patient on the encounter date included 5 minutes previsit service time reviewing records and precharting, 10 minutes face-to-face service time counseling regarding results and coordinating care, and  5 minutes charting, totalling 20 minutes. Please don't hesitate to contact our office with any concerns or questions.   Kathrin Lee MD

## 2023-10-24 ENCOUNTER — ROUTINE PRENATAL (OUTPATIENT)
Dept: PERINATAL CARE | Facility: CLINIC | Age: 33
End: 2023-10-24
Payer: COMMERCIAL

## 2023-10-24 VITALS
WEIGHT: 286.4 LBS | BODY MASS INDEX: 41 KG/M2 | HEART RATE: 97 BPM | DIASTOLIC BLOOD PRESSURE: 80 MMHG | SYSTOLIC BLOOD PRESSURE: 120 MMHG | HEIGHT: 70 IN

## 2023-10-24 DIAGNOSIS — O09.292 PRIOR PERINATAL LOSS IN SECOND TRIMESTER, ANTEPARTUM: Primary | ICD-10-CM

## 2023-10-24 DIAGNOSIS — Z3A.19 19 WEEKS GESTATION OF PREGNANCY: ICD-10-CM

## 2023-10-24 DIAGNOSIS — O34.219 PREVIOUS CESAREAN SECTION COMPLICATING PREGNANCY: ICD-10-CM

## 2023-10-24 PROCEDURE — 76817 TRANSVAGINAL US OBSTETRIC: CPT | Performed by: OBSTETRICS & GYNECOLOGY

## 2023-10-24 PROCEDURE — 99212 OFFICE O/P EST SF 10 MIN: CPT | Performed by: OBSTETRICS & GYNECOLOGY

## 2023-10-24 NOTE — PROGRESS NOTES
Arabella Pugh  has no complaints today at 19w3d. She does not report any vaginal bleeding or signs of labor. Her recently completed fetal testing revealed a normal NIPT. MSAFP was not completed by today's visit. She is here today for a cervical length screen. Problem list:  Prior midtrimester threatened ab at 14w6d with bleeding and leaking, managed in the next pregnancy as a history of P PROM and abruption. She had a d/e in this pregnancy due to the poor prognosis. History of a term  section. Prior pregnancy with a single umbilical artery  Maternal obesity based on a pregravid BMI of 38    Ultrasound findings: The ultrasound today shows a normal cervical length. There was a lower uterine segment contraction in the beginning of her study that resolved by the end of the study and we were able to confirm that there is no evidence for previa or low-lying placenta. The placenta also does not appear to be near her  scar. Pregnancy ultrasound has limitations and is unable to detect all forms of fetal congenital abnormalities. Follow up recommended:   She has a follow-up anatomy scan scheduled for 11-3-23. She has 2 further cervical lengths planned on 11/3/2023 and 2023. Pre visit time reviewing her records   5 minutes  Face to face time 5 minutes  Post visit time on documentation of note, updating her problem list, adding orders and prescriptions 5 minutes. Procedures that were completed today were charged separately. The level of decision making was straight forward.     Mike Paz MD

## 2023-10-24 NOTE — LETTER
2023     [address] [facility] Fabiana Wright MD   [facility]    Patient: Zain Napoles   YOB: 1990   Date of Visit: 10/24/2023       Dear Dr. Fabiana Wright: Thank you for referring Mar Bryan to me for evaluation. Below are my notes for this consultation. If you have questions, please do not hesitate to call me. I look forward to following your patient along with you. Sincerely,        Adelso Brown MD        CC: No Recipients    Adelso Brown MD  10/24/2023  6:27 PM  Sign when Signing Visit  Kathy Jones  has no complaints today at 19w3d. She does not report any vaginal bleeding or signs of labor. Her recently completed fetal testing revealed a normal NIPT. MSAFP was not completed by today's visit. She is here today for a cervical length screen. Problem list:  Prior midtrimester threatened ab at 14w6d with bleeding and leaking, managed in the next pregnancy as a history of P PROM and abruption. She had a d/e in this pregnancy due to the poor prognosis. History of a term  section. Prior pregnancy with a single umbilical artery  Maternal obesity based on a pregravid BMI of 38    Ultrasound findings: The ultrasound today shows a normal cervical length. There was a lower uterine segment contraction in the beginning of her study that resolved by the end of the study and we were able to confirm that there is no evidence for previa or low-lying placenta. The placenta also does not appear to be near her  scar. Pregnancy ultrasound has limitations and is unable to detect all forms of fetal congenital abnormalities. Follow up recommended:   She has a follow-up anatomy scan scheduled for 11-3-23. She has 2 further cervical lengths planned on 11/3/2023 and 2023.     Pre visit time reviewing her records   5 minutes  Face to face time 5 minutes  Post visit time on documentation of note, updating her problem list, adding orders and prescriptions 5 minutes. Procedures that were completed today were charged separately. The level of decision making was straight forward.     Leo Cooper MD

## 2023-10-24 NOTE — PROGRESS NOTES
Ultrasound Probe Disinfection    A transvaginal ultrasound was performed. Prior to use, disinfection was performed with High Level Disinfection Process ([employer]). Probe serial number B3: [id number] SAKINA was used.       Natali Hoffmann  10/24/23  2:21 PM

## 2023-10-26 DIAGNOSIS — F41.1 GENERALIZED ANXIETY DISORDER: ICD-10-CM

## 2023-11-02 ENCOUNTER — ROUTINE PRENATAL (OUTPATIENT)
Dept: OBGYN CLINIC | Facility: MEDICAL CENTER | Age: 33
End: 2023-11-02

## 2023-11-02 DIAGNOSIS — Z3A.20 20 WEEKS GESTATION OF PREGNANCY: ICD-10-CM

## 2023-11-02 DIAGNOSIS — O34.219 PREVIOUS CESAREAN SECTION COMPLICATING PREGNANCY: Primary | ICD-10-CM

## 2023-11-02 PROCEDURE — PNV: Performed by: STUDENT IN AN ORGANIZED HEALTH CARE EDUCATION/TRAINING PROGRAM

## 2023-11-02 NOTE — PROGRESS NOTES
Routine Prenatal Visit  [facility] [address] [place] [address]    Assessment/Plan:  Valeriy Bhandari is a 29y.o. year old  at 20w5d who presents for routine prenatal visit. 1. Previous  section complicating pregnancy    2. 20 weeks gestation of pregnancy          Subjective:     CC: Prenatal care    Adam Bentley is a 29 y.o. [id number] female who presents for routine prenatal care at [facility]. Pregnancy ROS: Denies leakage of fluid, pelvic pain, or vaginal bleeding. Reports normal fetal movement. The following portions of the patient's history were reviewed and updated as appropriate: allergies, current medications, past family history, past medical history, obstetric history, gynecologic history, past social history, past surgical history and problem list.      Objective:  LMP 2023 (Approximate)   Pregravid Weight/BMI: 122 kg (270 lb) (BMI 38.74)  Current Weight:     Total Weight Gain: 7.439 kg (16 lb 6.4 oz)        General: Well appearing, no distress  Respiratory: Unlabored breathing  Cardiovascular: Regular rate. Abdomen: Soft, gravid, nontender  Fundal Height: Appropriate for gestational age. Extremities: Warm and well perfused. Non tender.

## 2023-11-03 ENCOUNTER — ROUTINE PRENATAL (OUTPATIENT)
Dept: PERINATAL CARE | Facility: CLINIC | Age: 33
End: 2023-11-03
Payer: COMMERCIAL

## 2023-11-03 VITALS
WEIGHT: 285.2 LBS | DIASTOLIC BLOOD PRESSURE: 80 MMHG | BODY MASS INDEX: 40.83 KG/M2 | SYSTOLIC BLOOD PRESSURE: 118 MMHG | HEART RATE: 105 BPM | HEIGHT: 70 IN

## 2023-11-03 DIAGNOSIS — O34.219 PREVIOUS CESAREAN SECTION COMPLICATING PREGNANCY: ICD-10-CM

## 2023-11-03 DIAGNOSIS — Z36.86 ENCOUNTER FOR ANTENATAL SCREENING FOR CERVICAL LENGTH: ICD-10-CM

## 2023-11-03 DIAGNOSIS — O09.899 HISTORY OF PRETERM DELIVERY, CURRENTLY PREGNANT: ICD-10-CM

## 2023-11-03 DIAGNOSIS — Z36.3 ENCOUNTER FOR ANTENATAL SCREENING FOR MALFORMATIONS: Primary | ICD-10-CM

## 2023-11-03 DIAGNOSIS — Z87.59 HISTORY OF PLACENTA ABRUPTION: ICD-10-CM

## 2023-11-03 DIAGNOSIS — O99.210 OBESITY IN PREGNANCY: ICD-10-CM

## 2023-11-03 PROCEDURE — 76817 TRANSVAGINAL US OBSTETRIC: CPT | Performed by: OBSTETRICS & GYNECOLOGY

## 2023-11-03 PROCEDURE — 99213 OFFICE O/P EST LOW 20 MIN: CPT | Performed by: OBSTETRICS & GYNECOLOGY

## 2023-11-03 PROCEDURE — 76811 OB US DETAILED SNGL FETUS: CPT | Performed by: OBSTETRICS & GYNECOLOGY

## 2023-11-03 NOTE — PROGRESS NOTES
[id number]  [facility]: Ms. Nelson Chou was seen today for anatomic survey and cervical length screening ultrasound. See ultrasound report under "OB Procedures" tab. The time spent on this established patient on the encounter date included 5 minutes previsit service time reviewing records and precharting, 5 minutes face-to-face service time counseling regarding results and coordinating care, and  4 minutes charting, totalling 14 minutes. Please don't hesitate to contact our office with any concerns or questions.   Yolande Avitia MD

## 2023-11-03 NOTE — PATIENT INSTRUCTIONS
Thank you for choosing us for your  care today. If you have any questions about your ultrasound or care, please do not hesitate to contact us or your primary obstetrician. Some general instructions for your pregnancy are:    Exercise: Aim for 22 minutes per day (150 minutes per week) of regular exercise. Walking is great! Nutrition: Choose healthy sources of calcium, iron, and protein. Learn about Preeclampsia: preeclampsia is a common, serious high blood pressure complication in pregnancy. A blood pressure of 310YUNP (systolic or top number) or 17NWCL (diastolic or bottom number) is not normal and needs evaluation by your doctor. Aspirin is sometimes prescribed in early pregnancy to prevent preeclampsia in women with risk factors - ask your obstetrician if you should be on this medication. For more resources, visit:  MapCoverage.fi  If you smoke, try to reduce how many cigarettes you smoke or try to quit completely. Do not vape. Other warning signs to watch out for in pregnancy or postpartum: chest pain, obstructed breathing or shortness of breath, seizures, thoughts of hurting yourself or your baby, bleeding, a painful or swollen leg, fever, or headache (see AWHONN POST-BIRTH Warning Signs campaign). If these happen call 911. Itching is also not normal in pregnancy and if you experience this, especially over your hands and feet, potentially worse at night, notify your doctors.

## 2023-11-03 NOTE — PROGRESS NOTES
Ultrasound Probe Disinfection    A transvaginal ultrasound was performed. Prior to use, disinfection was performed with High Level Disinfection Process ([employer]). Probe serial number B1: [id number] was used.     Chaperone: ALPHONSO Hobson RDMS

## 2023-11-14 ENCOUNTER — ROUTINE PRENATAL (OUTPATIENT)
Age: 33
End: 2023-11-14
Payer: COMMERCIAL

## 2023-11-14 VITALS
WEIGHT: 288.6 LBS | DIASTOLIC BLOOD PRESSURE: 64 MMHG | HEART RATE: 97 BPM | BODY MASS INDEX: 41.32 KG/M2 | SYSTOLIC BLOOD PRESSURE: 116 MMHG | HEIGHT: 70 IN

## 2023-11-14 DIAGNOSIS — Z3A.22 22 WEEKS GESTATION OF PREGNANCY: Primary | ICD-10-CM

## 2023-11-14 DIAGNOSIS — O09.899 HISTORY OF PRETERM DELIVERY, CURRENTLY PREGNANT: ICD-10-CM

## 2023-11-14 DIAGNOSIS — O99.210 OBESITY IN PREGNANCY: ICD-10-CM

## 2023-11-14 DIAGNOSIS — O34.219 PREVIOUS CESAREAN SECTION COMPLICATING PREGNANCY: ICD-10-CM

## 2023-11-14 PROCEDURE — 76805 OB US >/= 14 WKS SNGL FETUS: CPT | Performed by: OBSTETRICS & GYNECOLOGY

## 2023-11-14 PROCEDURE — 99213 OFFICE O/P EST LOW 20 MIN: CPT | Performed by: OBSTETRICS & GYNECOLOGY

## 2023-11-14 PROCEDURE — 76817 TRANSVAGINAL US OBSTETRIC: CPT | Performed by: OBSTETRICS & GYNECOLOGY

## 2023-11-14 RX ORDER — NICOTINE 14MG/24HR
PATCH, TRANSDERMAL 24 HOURS TRANSDERMAL DAILY
COMMUNITY

## 2023-11-14 NOTE — PROGRESS NOTES
Ultrasound Probe Disinfection    A transvaginal ultrasound was performed. Prior to use, disinfection was performed with High Level Disinfection Process ([email or web address]). Probe serial number S1: [id number] was used.       Herminia Arshad  11/14/23  11:12 AM

## 2023-11-14 NOTE — LETTER
November 14, 2023     Andree Highramya, [address] [place] [address],[address]  [place]    Patient: Checo Napoles   YOB: 1990   Date of Visit: 11/14/2023       Dear Dr. Ezra Nolen: Thank you for referring Vesna Franz to me for evaluation. Below are my notes for this consultation. If you have questions, please do not hesitate to call me. I look forward to following your patient along with you. Sincerely,        Dorys Fountain MD        CC: No Recipients    Dorys Fountain MD  11/14/2023 12:05 PM  Sign when Signing Visit  A fetal ultrasound was completed. See Ob procedures in Epic for an interpretation and recommendations. Do not hesitate to contact us in [facility] if you have questions. Richard Mata MD, [address] [facility]  Maternal Fetal Medicine

## 2023-11-14 NOTE — PROGRESS NOTES
A fetal ultrasound was completed. See Ob procedures in Epic for an interpretation and recommendations. Do not hesitate to contact us in [facility] if you have questions. Raul Mulligan MD, [address] [facility]  Maternal Fetal Medicine

## 2023-11-28 ENCOUNTER — ROUTINE PRENATAL (OUTPATIENT)
Dept: OBGYN CLINIC | Facility: MEDICAL CENTER | Age: 33
End: 2023-11-28

## 2023-11-28 VITALS — DIASTOLIC BLOOD PRESSURE: 80 MMHG | BODY MASS INDEX: 41.88 KG/M2 | SYSTOLIC BLOOD PRESSURE: 118 MMHG | WEIGHT: 291.9 LBS

## 2023-11-28 DIAGNOSIS — Z34.92 SECOND TRIMESTER PREGNANCY: Primary | ICD-10-CM

## 2023-11-28 PROBLEM — Z3A.24 24 WEEKS GESTATION OF PREGNANCY: Status: ACTIVE | Noted: 2023-09-07

## 2023-11-28 PROCEDURE — PNV: Performed by: OBSTETRICS & GYNECOLOGY

## 2023-11-28 NOTE — PROGRESS NOTES
Routine Prenatal Visit  [facility] [address] [place] [address]    Assessment/Plan:  Sandi Mcleod is a 29y.o. year old  at 24w3d who presents for routine prenatal visit. 1. Second trimester pregnancy  -     Anemia Panel w/Reflex, OB; Future  -     RPR-Syphilis Screening (Total Syphilis IGG/IGM); Future  -     Glucose, 1H PG; Future  -     Hepatitis C antibody; Future  -     CBC and differential; Future  -     Glucose, 1H PG  -     Hepatitis C antibody  -     CBC and differential  -     CBC and differential; Future  -     Anemia Panel w/Reflex, OB; Future  -     RPR-Syphilis Screening (Total Syphilis IGG/IGM); Future  -     Glucose, 1H PG; Future  -     Hepatitis C antibody; Future          Subjective:     CC: Prenatal care    Wade Zhang is a 29 y.o. [id number] female who presents for routine prenatal care at 24w3d. Pregnancy ROS: no leakage of fluid, pelvic pain, or vaginal bleeding.  good fetal movement.     The following portions of the patient's history were reviewed and updated as appropriate: allergies, current medications, past family history, past medical history, obstetric history, gynecologic history, past social history, past surgical history and problem list.      Objective:  /80   Wt 132 kg (291 lb 14.4 oz)   LMP 2023 (Approximate)   BMI 41.88 kg/m²   Pregravid Weight/BMI: 122 kg (270 lb) (BMI 38.74)  Current Weight: 132 kg (291 lb 14.4 oz)   Total Weight Gain: 9.934 kg (21 lb 14.4 oz)   Pre-Ale Vitals      Flowsheet Row Most Recent Value   Prenatal Assessment    Fetal Heart Rate 142   Movement Present   Prenatal Vitals    Blood Pressure 118/80   Weight - Scale 132 kg (291 lb 14.4 oz)   Urine Albumin/Glucose    Dilation/Effacement/Station    Vaginal Drainage    Draining Fluid No   Edema    LLE Edema None   RLE Edema None   Facial Edema None             General: Well appearing, no distress  Respiratory: Unlabored breathing  Cardiovascular: Regular rate. Abdomen: Soft, gravid, nontender  Fundal Height: Appropriate for gestational age. Extremities: Warm and well perfused. Non tender.

## 2023-12-25 PROBLEM — Z3A.28 28 WEEKS GESTATION OF PREGNANCY: Status: ACTIVE | Noted: 2023-09-07

## 2023-12-26 ENCOUNTER — ROUTINE PRENATAL (OUTPATIENT)
Dept: OBGYN CLINIC | Facility: MEDICAL CENTER | Age: 33
End: 2023-12-26
Payer: COMMERCIAL

## 2023-12-26 VITALS — SYSTOLIC BLOOD PRESSURE: 118 MMHG | BODY MASS INDEX: 42.21 KG/M2 | WEIGHT: 293 LBS | DIASTOLIC BLOOD PRESSURE: 76 MMHG

## 2023-12-26 DIAGNOSIS — Z3A.28 28 WEEKS GESTATION OF PREGNANCY: ICD-10-CM

## 2023-12-26 DIAGNOSIS — O34.219 PREVIOUS CESAREAN SECTION COMPLICATING PREGNANCY: Primary | ICD-10-CM

## 2023-12-26 DIAGNOSIS — Z23 ENCOUNTER FOR IMMUNIZATION: ICD-10-CM

## 2023-12-26 DIAGNOSIS — O99.341 DEPRESSION DURING PREGNANCY IN FIRST TRIMESTER: ICD-10-CM

## 2023-12-26 DIAGNOSIS — F32.A DEPRESSION DURING PREGNANCY IN FIRST TRIMESTER: ICD-10-CM

## 2023-12-26 DIAGNOSIS — O09.899 HISTORY OF PRETERM DELIVERY, CURRENTLY PREGNANT: ICD-10-CM

## 2023-12-26 DIAGNOSIS — O99.210 OBESITY IN PREGNANCY: ICD-10-CM

## 2023-12-26 PROCEDURE — 90715 TDAP VACCINE 7 YRS/> IM: CPT | Performed by: NURSE PRACTITIONER

## 2023-12-26 PROCEDURE — PNV: Performed by: NURSE PRACTITIONER

## 2023-12-26 PROCEDURE — 90471 IMMUNIZATION ADMIN: CPT | Performed by: NURSE PRACTITIONER

## 2023-12-27 ENCOUNTER — ULTRASOUND (OUTPATIENT)
Dept: PERINATAL CARE | Facility: CLINIC | Age: 33
End: 2023-12-27
Payer: COMMERCIAL

## 2023-12-27 VITALS
HEART RATE: 98 BPM | BODY MASS INDEX: 41.95 KG/M2 | HEIGHT: 70 IN | DIASTOLIC BLOOD PRESSURE: 75 MMHG | WEIGHT: 293 LBS | SYSTOLIC BLOOD PRESSURE: 123 MMHG

## 2023-12-27 DIAGNOSIS — O09.899 HISTORY OF PRETERM DELIVERY, CURRENTLY PREGNANT: ICD-10-CM

## 2023-12-27 DIAGNOSIS — O34.219 PREVIOUS CESAREAN SECTION COMPLICATING PREGNANCY: ICD-10-CM

## 2023-12-27 DIAGNOSIS — Z3A.28 28 WEEKS GESTATION OF PREGNANCY: ICD-10-CM

## 2023-12-27 DIAGNOSIS — O99.213 OBESITY DURING PREGNANCY IN THIRD TRIMESTER: Primary | ICD-10-CM

## 2023-12-27 LAB
DME PARACHUTE DELIVERY DATE REQUESTED: NORMAL
DME PARACHUTE ITEM DESCRIPTION: NORMAL
DME PARACHUTE ORDER STATUS: NORMAL
DME PARACHUTE SUPPLIER NAME: NORMAL
DME PARACHUTE SUPPLIER PHONE: NORMAL

## 2023-12-27 PROCEDURE — 76816 OB US FOLLOW-UP PER FETUS: CPT | Performed by: OBSTETRICS & GYNECOLOGY

## 2023-12-27 PROCEDURE — 99213 OFFICE O/P EST LOW 20 MIN: CPT | Performed by: OBSTETRICS & GYNECOLOGY

## 2023-12-27 NOTE — PROGRESS NOTES
Sarah Napoles has no complaints today.  She reports regular fetal movements and does not report any problems.  She is here today at 28w4d for an ultrasound for fetal growth and missed anatomy.    Problem list:  Abruption leading to P PROM at 14 weeks and 6 days in a prior pregnancy  History of a low transverse  and is planning on a repeat  for an 8 pound 13 ounce baby  Obesity     Ultrasound findings:  The ultrasound today shows normal interval fetal growth and fluid.  The prior missed anatomy was reviewed and appears normal thus completing the anatomical survey.    Pregnancy ultrasound has limitations and is unable to detect all forms of fetal congenital abnormalities.  The inaccuracy in the EFW can be off by 1 lb either way in the third trimester.    Follow up recommended:   Recommend starting weekly NSTs/fluid scans at 34 weeks  Recommend a growth scan in 8 weeks.    Pre visit time reviewing her records   5 minutes  Face to face time 5 minutes  Post visit time on documentation of note, updating her problem list, adding orders and prescriptions 5 minutes.  Procedures that were completed today were charged separately.   The level of decision making was straight forward.    Paige Dupont MD

## 2023-12-27 NOTE — LETTER
2023     Dolly Jacques, OSCAR  [address]  [address]  [facility] [address]    Patient: Sarah Napoles   YOB: 1990   Date of Visit: 2023       Dear Dr. Jacques:    Thank you for referring Sarah Napoles to me for evaluation. Below are my notes for this consultation.    If you have questions, please do not hesitate to call me. I look forward to following your patient along with you.         Sincerely,        Paige Dupont MD        CC: No Recipients    Paige Dupont MD  2023 12:56 PM  Sign when Signing Visit  Sarah Napoles has no complaints today.  She reports regular fetal movements and does not report any problems.  She is here today at 28w4d for an ultrasound for fetal growth and missed anatomy.    Problem list:  Abruption leading to P PROM at 14 weeks and 6 days in a prior pregnancy  History of a low transverse  and is planning on a repeat  for an 8 pound 13 ounce baby  Obesity     Ultrasound findings:  The ultrasound today shows normal interval fetal growth and fluid.  The prior missed anatomy was reviewed and appears normal thus completing the anatomical survey.    Pregnancy ultrasound has limitations and is unable to detect all forms of fetal congenital abnormalities.  The inaccuracy in the EFW can be off by 1 lb either way in the third trimester.    Follow up recommended:   Recommend starting weekly NSTs/fluid scans at 34 weeks  Recommend a growth scan in 8 weeks.    Pre visit time reviewing her records   5 minutes  Face to face time 5 minutes  Post visit time on documentation of note, updating her problem list, adding orders and prescriptions 5 minutes.  Procedures that were completed today were charged separately.   The level of decision making was straight forward.    Paige Dupont MD

## 2023-12-30 DIAGNOSIS — Z3A.12 12 WEEKS GESTATION OF PREGNANCY: ICD-10-CM

## 2023-12-30 DIAGNOSIS — O99.210 OBESITY IN PREGNANCY: ICD-10-CM

## 2023-12-30 DIAGNOSIS — Z87.59 HISTORY OF PLACENTA ABRUPTION: ICD-10-CM

## 2024-01-02 RX ORDER — ASPIRIN 81 MG/1
162 TABLET, CHEWABLE ORAL DAILY
Qty: 112 TABLET | Refills: 0 | Status: SHIPPED | OUTPATIENT
Start: 2024-01-02 | End: 2024-02-27

## 2024-01-08 ENCOUNTER — TELEPHONE (OUTPATIENT)
Dept: GYNECOLOGY | Facility: CLINIC | Age: 34
End: 2024-01-08

## 2024-01-08 NOTE — TELEPHONE ENCOUNTER
----- Message from OSCAR Serra sent at 2023 10:57 AM EST -----  Patient is interested in repeat  section.  EDC 3/16/23  39 weeks on 3/9/23  Any provider.    Thank you

## 2024-01-09 ENCOUNTER — ROUTINE PRENATAL (OUTPATIENT)
Dept: OBGYN CLINIC | Facility: MEDICAL CENTER | Age: 34
End: 2024-01-09

## 2024-01-09 VITALS — SYSTOLIC BLOOD PRESSURE: 118 MMHG | WEIGHT: 293 LBS | DIASTOLIC BLOOD PRESSURE: 70 MMHG | BODY MASS INDEX: 42.62 KG/M2

## 2024-01-09 DIAGNOSIS — Z3A.30 30 WEEKS GESTATION OF PREGNANCY: Primary | ICD-10-CM

## 2024-01-09 DIAGNOSIS — O34.219 PREVIOUS CESAREAN SECTION COMPLICATING PREGNANCY: ICD-10-CM

## 2024-01-09 LAB
BASOPHILS # BLD AUTO: 0 X10E3/UL (ref 0–0.2)
BASOPHILS NFR BLD AUTO: 0 %
EOSINOPHIL # BLD AUTO: 0.1 X10E3/UL (ref 0–0.4)
EOSINOPHIL NFR BLD AUTO: 1 %
ERYTHROCYTE [DISTWIDTH] IN BLOOD BY AUTOMATED COUNT: 12.9 % (ref 11.7–15.4)
FERRITIN SERPL-MCNC: 12 NG/ML (ref 15–150)
FOLATE SERPL-MCNC: 18.5 NG/ML
GLUCOSE 1H P 50 G GLC PO SERPL-MCNC: 104 MG/DL (ref 70–139)
HCT VFR BLD AUTO: 33.5 % (ref 34–46.6)
HCV AB S/CO SERPL IA: NON REACTIVE
HGB BLD-MCNC: 11.3 G/DL (ref 11.1–15.9)
IMM GRANULOCYTES # BLD: 0.1 X10E3/UL (ref 0–0.1)
IMM GRANULOCYTES NFR BLD: 1 %
IRON SATN MFR SERPL: 18 % (ref 15–55)
IRON SERPL-MCNC: 68 UG/DL (ref 27–159)
LYMPHOCYTES # BLD AUTO: 1.6 X10E3/UL (ref 0.7–3.1)
LYMPHOCYTES NFR BLD AUTO: 16 %
MCH RBC QN AUTO: 30.1 PG (ref 26.6–33)
MCHC RBC AUTO-ENTMCNC: 33.7 G/DL (ref 31.5–35.7)
MCV RBC AUTO: 89 FL (ref 79–97)
MONOCYTES # BLD AUTO: 0.5 X10E3/UL (ref 0.1–0.9)
MONOCYTES NFR BLD AUTO: 5 %
NEUTROPHILS # BLD AUTO: 7.9 X10E3/UL (ref 1.4–7)
NEUTROPHILS NFR BLD AUTO: 77 %
PLATELET # BLD AUTO: 241 X10E3/UL (ref 150–450)
RBC # BLD AUTO: 3.75 X10E6/UL (ref 3.77–5.28)
RETICS/RBC NFR AUTO: 1.8 % (ref 0.6–2.6)
RPR SER QL: NON REACTIVE
TIBC SERPL-MCNC: 387 UG/DL (ref 250–450)
UIBC SERPL-MCNC: 319 UG/DL (ref 131–425)
VIT B12 SERPL-MCNC: 213 PG/ML (ref 232–1245)
WBC # BLD AUTO: 10.1 X10E3/UL (ref 3.4–10.8)

## 2024-01-09 PROCEDURE — PNV: Performed by: OBSTETRICS & GYNECOLOGY

## 2024-01-09 NOTE — PROGRESS NOTES
Sarah is a 33 y.o. year old  at 30w3d for routine prenatal visit.   + FM, no vaginal bleeding, contractions, or LOF  Complaints: none  1hr gtt and lab work done yesterday - results pending at time of visit    Most recent ultrasound and labs reviewed.  [facility] reviewed   Desires repeat C/S  no tubal  - we discussed possible date  3/11 - message to schedule sent   Has follow up at [facility] scheduled

## 2024-01-15 ENCOUNTER — TELEPHONE (OUTPATIENT)
Dept: OBGYN CLINIC | Facility: MEDICAL CENTER | Age: 34
End: 2024-01-15

## 2024-01-15 NOTE — TELEPHONE ENCOUNTER
Patient called into office requesting to review lab results from 1/8/24. Please review, thank you!

## 2024-01-23 ENCOUNTER — ROUTINE PRENATAL (OUTPATIENT)
Dept: OBGYN CLINIC | Facility: MEDICAL CENTER | Age: 34
End: 2024-01-23

## 2024-01-23 VITALS — WEIGHT: 293 LBS | SYSTOLIC BLOOD PRESSURE: 116 MMHG | DIASTOLIC BLOOD PRESSURE: 80 MMHG | BODY MASS INDEX: 43.12 KG/M2

## 2024-01-23 DIAGNOSIS — Z34.93 THIRD TRIMESTER PREGNANCY: Primary | ICD-10-CM

## 2024-01-23 DIAGNOSIS — Z87.59 HISTORY OF PLACENTA ABRUPTION: ICD-10-CM

## 2024-01-23 DIAGNOSIS — O99.210 OBESITY IN PREGNANCY: ICD-10-CM

## 2024-01-23 DIAGNOSIS — O34.219 PREVIOUS CESAREAN SECTION COMPLICATING PREGNANCY: ICD-10-CM

## 2024-01-23 DIAGNOSIS — Z3A.32 32 WEEKS GESTATION OF PREGNANCY: ICD-10-CM

## 2024-01-23 DIAGNOSIS — O09.899 HISTORY OF PRETERM DELIVERY, CURRENTLY PREGNANT: ICD-10-CM

## 2024-01-23 PROCEDURE — PNV: Performed by: OBSTETRICS & GYNECOLOGY

## 2024-01-23 NOTE — PROGRESS NOTES
Assessment  33 y.o.  at 32w3d presenting for routine prenatal visit.     Plan  Diagnoses and all orders for this visit:    Third trimester pregnancy  32 weeks gestation of pregnancy  - PTL precautions  - FKC  - Return in 2wk for PN    Previous  section complicating pregnancy  - RLTCS scheduled    History of placenta abruption  History of  delivery, currently pregnant  Obesity in pregnancy  - Follows with MFM  - Upcoming growth scan  - APFS starting 34wks (weekly NST/RACHELL)      ____________________________________________________________        Subjective    Sarah Napoles is a 33 y.o.  at 32w3d who presents for routine prenatal visit. She is uncomfortable increasingly with time. Inc pelvic pressure. She denies contractions, loss of fluid, or vaginal bleeding. She feels regular fetal movements.     Pregnancy Problems:  Patient Active Problem List   Diagnosis    ADD (attention deficit disorder) without hyperactivity    Generalized anxiety disorder    PCOS (polycystic ovarian syndrome)    Mild episode of recurrent major depressive disorder (HCC)    Positive GBS test    Depression during pregnancy in first trimester    History of placenta abruption    Previous  section complicating pregnancy    32 weeks gestation of pregnancy    Obesity in pregnancy    History of  delivery, currently pregnant         Objective  /80   Wt (!) 136 kg (300 lb 8 oz)   LMP 2023 (Approximate)   BMI 43.12 kg/m²     FHT: 142 BPM   Uterine Size: 33 cm     Physical Exam:  Physical Exam  Constitutional:       General: She is not in acute distress.     Appearance: Normal appearance. She is well-developed. She is not ill-appearing, toxic-appearing or diaphoretic.   HENT:      Head: Normocephalic and atraumatic.   Eyes:      General: No scleral icterus.        Right eye: No discharge.         Left eye: No discharge.      Conjunctiva/sclera: Conjunctivae normal.   Pulmonary:       Effort: Pulmonary effort is normal. No accessory muscle usage or respiratory distress.   Abdominal:      General: There is distension (gravid).      Tenderness: There is no abdominal tenderness. There is no guarding or rebound.   Skin:     General: Skin is warm and dry.      Coloration: Skin is not jaundiced.      Findings: No bruising, erythema or rash.   Neurological:      Mental Status: She is alert.   Psychiatric:         Mood and Affect: Mood normal.         Behavior: Behavior normal.         Thought Content: Thought content normal.         Judgment: Judgment normal.

## 2024-01-26 DIAGNOSIS — O99.210 OBESITY IN PREGNANCY: ICD-10-CM

## 2024-01-26 DIAGNOSIS — Z87.59 HISTORY OF PLACENTA ABRUPTION: ICD-10-CM

## 2024-01-26 DIAGNOSIS — Z3A.12 12 WEEKS GESTATION OF PREGNANCY: ICD-10-CM

## 2024-01-26 RX ORDER — ASPIRIN 81 MG
TABLET,CHEWABLE ORAL
Qty: 180 TABLET | Refills: 1 | Status: SHIPPED | OUTPATIENT
Start: 2024-01-26

## 2024-01-31 ENCOUNTER — ULTRASOUND (OUTPATIENT)
Dept: PERINATAL CARE | Facility: CLINIC | Age: 34
End: 2024-01-31
Payer: COMMERCIAL

## 2024-01-31 VITALS
HEART RATE: 98 BPM | HEIGHT: 70 IN | SYSTOLIC BLOOD PRESSURE: 112 MMHG | DIASTOLIC BLOOD PRESSURE: 80 MMHG | WEIGHT: 293 LBS | BODY MASS INDEX: 41.95 KG/M2

## 2024-01-31 DIAGNOSIS — Z3A.33 33 WEEKS GESTATION OF PREGNANCY: ICD-10-CM

## 2024-01-31 DIAGNOSIS — O99.210 OBESITY IN PREGNANCY: Primary | ICD-10-CM

## 2024-01-31 PROCEDURE — 76816 OB US FOLLOW-UP PER FETUS: CPT | Performed by: OBSTETRICS & GYNECOLOGY

## 2024-01-31 NOTE — PROGRESS NOTES
"Today's ultrasound was read remotely.    There has been appropriate interval fetal growth. Good fetal movement and tone are seen. The amniotic fluid volume appears normal.     The patient is currently scheduled for  surveillance starting at 36 weeks gestation.    Thank you very much for allowing us to participate in the care of this very nice patient. Should you have any questions, please do not hesitate to contact our office.    Portions of the record may have been created with voice recognition software. Occasional wrong word or \"sound a like\" substitutions may have occurred due to the inherent limitations of voice recognition software. Read the chart carefully and recognize, using context, where substitutions have occurred.  "

## 2024-02-01 ENCOUNTER — TELEPHONE (OUTPATIENT)
Dept: OBGYN CLINIC | Facility: MEDICAL CENTER | Age: 34
End: 2024-02-01

## 2024-02-01 DIAGNOSIS — Z3A.33 33 WEEKS GESTATION OF PREGNANCY: Primary | ICD-10-CM

## 2024-02-01 NOTE — TELEPHONE ENCOUNTER
Pt called office she's currently 34 weeks yesterday started having itching on her palms and feet no swollen or rash, had a US yesterday baby was fine.

## 2024-02-01 NOTE — TELEPHONE ENCOUNTER
Returned patient's call. Patient reports itchiness on hands and feet started last night. Denies any other symptoms. Patient aware we are placing blood work for her to complete. Patient advised can try cool baths to help itchiness feel less intense, oatmeal baths, creams and lotions to help soothe the skin. Patient advised to call back to report any changes or with any questions or concerns. Patient goes to [facility] in [place]. Orders faxed. Patient has appointment with us scheduled next week and will follow-up then as well.

## 2024-02-03 ENCOUNTER — NURSE TRIAGE (OUTPATIENT)
Dept: OTHER | Facility: OTHER | Age: 34
End: 2024-02-03

## 2024-02-03 ENCOUNTER — HOSPITAL ENCOUNTER (OUTPATIENT)
Facility: HOSPITAL | Age: 34
Setting detail: SURGERY ADMIT
End: 2024-02-03
Attending: OBSTETRICS & GYNECOLOGY | Admitting: OBSTETRICS & GYNECOLOGY
Payer: COMMERCIAL

## 2024-02-03 ENCOUNTER — HOSPITAL ENCOUNTER (OUTPATIENT)
Facility: HOSPITAL | Age: 34
Discharge: HOME | End: 2024-02-03
Attending: OBSTETRICS & GYNECOLOGY | Admitting: OBSTETRICS & GYNECOLOGY
Payer: COMMERCIAL

## 2024-02-03 VITALS
RESPIRATION RATE: 20 BRPM | WEIGHT: 293 LBS | SYSTOLIC BLOOD PRESSURE: 96 MMHG | TEMPERATURE: 98.1 F | BODY MASS INDEX: 41.95 KG/M2 | DIASTOLIC BLOOD PRESSURE: 54 MMHG | HEART RATE: 95 BPM | HEIGHT: 70 IN

## 2024-02-03 DIAGNOSIS — O26.649 INTRAHEPATIC CHOLESTASIS OF PREGNANCY: Primary | ICD-10-CM

## 2024-02-03 LAB
ALBUMIN SERPL-MCNC: 3.7 G/DL (ref 3.9–4.9)
ALBUMIN/GLOB SERPL: 1.5 {RATIO} (ref 1.2–2.2)
ALP SERPL-CCNC: 161 IU/L (ref 44–121)
ALT SERPL-CCNC: 17 IU/L (ref 0–32)
AST SERPL-CCNC: 25 IU/L (ref 0–40)
BASOPHILS # BLD AUTO: 0 X10E3/UL (ref 0–0.2)
BASOPHILS NFR BLD AUTO: 0 %
BILE AC SERPL-SCNC: 11.3 UMOL/L (ref 0–10)
BILIRUB SERPL-MCNC: 0.3 MG/DL (ref 0–1.2)
BUN SERPL-MCNC: 8 MG/DL (ref 6–20)
BUN/CREAT SERPL: 15 (ref 9–23)
CALCIUM SERPL-MCNC: 8.9 MG/DL (ref 8.7–10.2)
CHLORIDE SERPL-SCNC: 106 MMOL/L (ref 96–106)
CO2 SERPL-SCNC: 18 MMOL/L (ref 20–29)
CREAT SERPL-MCNC: 0.55 MG/DL (ref 0.57–1)
EGFR: 125 ML/MIN/1.73
EOSINOPHIL # BLD AUTO: 0.1 X10E3/UL (ref 0–0.4)
EOSINOPHIL NFR BLD AUTO: 1 %
ERYTHROCYTE [DISTWIDTH] IN BLOOD BY AUTOMATED COUNT: 13.1 % (ref 11.7–15.4)
GLOBULIN SER-MCNC: 2.4 G/DL (ref 1.5–4.5)
GLUCOSE SERPL-MCNC: 91 MG/DL (ref 70–99)
HCT VFR BLD AUTO: 33.5 % (ref 34–46.6)
HGB BLD-MCNC: 11.2 G/DL (ref 11.1–15.9)
IMM GRANULOCYTES # BLD: 0.1 X10E3/UL (ref 0–0.1)
IMM GRANULOCYTES NFR BLD: 1 %
LYMPHOCYTES # BLD AUTO: 1.4 X10E3/UL (ref 0.7–3.1)
LYMPHOCYTES NFR BLD AUTO: 17 %
MCH RBC QN AUTO: 29 PG (ref 26.6–33)
MCHC RBC AUTO-ENTMCNC: 33.4 G/DL (ref 31.5–35.7)
MCV RBC AUTO: 87 FL (ref 79–97)
MONOCYTES # BLD AUTO: 0.7 X10E3/UL (ref 0.1–0.9)
MONOCYTES NFR BLD AUTO: 9 %
NEUTROPHILS # BLD AUTO: 6 X10E3/UL (ref 1.4–7)
NEUTROPHILS NFR BLD AUTO: 72 %
PLATELET # BLD AUTO: 229 X10E3/UL (ref 150–450)
POTASSIUM SERPL-SCNC: 4.1 MMOL/L (ref 3.5–5.2)
PROT SERPL-MCNC: 6.1 G/DL (ref 6–8.5)
RBC # BLD AUTO: 3.86 X10E6/UL (ref 3.77–5.28)
SODIUM SERPL-SCNC: 138 MMOL/L (ref 134–144)
WBC # BLD AUTO: 8.2 X10E3/UL (ref 3.4–10.8)

## 2024-02-03 PROCEDURE — 99203 OFFICE O/P NEW LOW 30 MIN: CPT

## 2024-02-03 PROCEDURE — NC001 PR NO CHARGE: Performed by: OBSTETRICS & GYNECOLOGY

## 2024-02-03 PROCEDURE — 76815 OB US LIMITED FETUS(S): CPT

## 2024-02-03 RX ORDER — URSODIOL 300 MG/1
300 CAPSULE ORAL 2 TIMES DAILY
Qty: 80 CAPSULE | Refills: 0 | Status: SHIPPED | OUTPATIENT
Start: 2024-02-03 | End: 2024-02-18

## 2024-02-03 NOTE — PROCEDURES
Sarah Priceda, a  at 34w0d with an LEONARDA of 3/16/2024, by Ultrasound, was seen at [facility] for the following procedure(s): $Procedure Type: RACHELL]         4 Quadrant RACHELL  RACHELL Q1 (cm): 4.5 cm  RACHELL Q2 (cm): 4.2 cm  RACHELL Q3 (cm): 0.5 cm  RACHELL Q4 (cm): 3.8 cm  RACHELL TOTAL (cm): 13 cm

## 2024-02-03 NOTE — PROGRESS NOTES
L&D Triage Note - OB/GYN  Sarah Napoles 33 y.o. female MRN: [id number]  Unit/Bed#: L&D 329-02 Encounter: [id number]      ASSESSMENT:    Sarah Napoles is a 33 y.o.  at 34w0d who was evaluated today for decreased fetal movement and itching of arms, hands, feet. NST is reactive and RACHELL within normal limits. Bile acids on  were 11.3, normal CMP.  Ursadiol sent to pharmacy. Patient will need twice weekly NST, message sent to [facility] to facilitate scheduling. Patient will schedule induction at 37 weeks at her next outpatient visit.    PLAN:    1) Decreased Fetal Movement  - RACHELL wnl  - NST reactive    2) Intrahepatic cholestasis of pregnancy  - Ursadiol sent to pharmacy  - Will need twice weekly NST  - Schedule induction at 37 weeks      3) 34 weeks gestation of pregnancy  - Continue routine prenatal care  - Discharge from OB triage with  labor precautions    - Reviewed rupture of membranes, false vs true labor, decreased fetal movement, and vaginal bleeding   - Pt to call provider with any concerns and follow up at her next scheduled prenatal appointment    - Case discussed with Dr. De Paz    SUBJECTIVE:    Sarah Napoles 33 y.o.  at 34w0d with an Estimated Date of Delivery: 3/16/24 presenting with decreased fetal movement and arm, hand and feet itching.  Patient states she still feels baby moving but has been overall decreased in intensity since last night.  She felt baby move 15 minutes ago while in triage.     Contractions: Denies  Leakage of fluid: Denies  Vaginal Bleeding: Denies  Fetal movement: present    OBJECTIVE:    Vitals:    24 0902   BP: 96/54   Pulse: 95   Resp: 20   Temp: 98.1 °F (36.7 °C)       ROS:  Constitutional: Negative  Respiratory: Negative  Cardiovascular: Negative    Gastrointestinal: Negative    General Physical Exam:  General: Well appearing, no distress  Respiratory: Unlabored breathing  Cardiovascular: Regular rate.  Abdomen: Soft,  gravid, nontender  Fundal Height: Appropriate for gestational age.  Extremities: Warm and well perfused.  Non tender.    Cervical Exam  SVE: declined    Fetal monitoring:  Fetal heart rate: Baseline Rate (FHR): 140 bpm  Variability: Moderate  Accelerations: 15 x 15 or greater  Decelerations: None  FHR Category: Category I  [place]: Contraction Frequency (minutes): Occasional (x 1)  Contraction Duration (seconds): 60      Imaging:        Abd. US   RACHELL      - Q1 4.47 cm     - Q2 4.18 cm     - Q3 0.49 cm     - Q4 3.81 cm     - Total: 14.3cm   Placenta: Right fundal   Presentation: cephalic    Mayuri Dumont MD  OBGYN, PGY-1  02/03/24  11:13 AM

## 2024-02-03 NOTE — TELEPHONE ENCOUNTER
"Reason for Disposition  • [1] Pregnant 23 or more weeks AND [2] baby moving less today by kick count  (e.g., kick count < 5 in 1 hour or < 10 in 2 hours)    Answer Assessment - Initial Assessment Questions  1. FETAL MOVEMENT: \"Has the baby's movement decreased or changed significantly from normal?\" (e.g., yes, no; describe)      Decreased FM since last night. Movement is very slow and not as active as normal.    2. LEONARDA: \"What date are you expecting to deliver?\"       3/16  3. PREGNANCY: \"How many weeks pregnant are you?\"       34 wks gestation    4. OTHER SYMPTOMS: \"Do you have any other symptoms?\" (e.g., abdominal pain, leaking fluid from vagina, vaginal bleeding, etc.)      No belly pain or back pain. No vaginal bleeding. No leakage of fluid. Still awaiting jose m results. Has been having itchiness all over her body that has worsened.    Protocols used: Pregnancy - Decreased Fetal Movement-ADULT-AH      Per Dr. Zandra Raza- go to [facility].   "

## 2024-02-05 ENCOUNTER — TELEPHONE (OUTPATIENT)
Facility: HOSPITAL | Age: 34
End: 2024-02-05

## 2024-02-05 NOTE — TELEPHONE ENCOUNTER
----- Message from Dalia Meeks RN sent at 2024  1:37 PM EST -----  Regarding: FW: Twice weekly NST for ICP    ----- Message -----  From: Mayuri Dumont MD  Sent: 2/3/2024   2:56 PM EST  To:  [facility]  Subject: Twice weekly NST for ICP                         Hello,    This patient will need to be scheduled for twice weekly NST due to intrahepatic cholestasis of pregnancy. She has an appointment coming up on the . Thank you!

## 2024-02-06 ENCOUNTER — TELEPHONE (OUTPATIENT)
Dept: OBGYN CLINIC | Facility: MEDICAL CENTER | Age: 34
End: 2024-02-06

## 2024-02-06 ENCOUNTER — ROUTINE PRENATAL (OUTPATIENT)
Dept: OBGYN CLINIC | Facility: MEDICAL CENTER | Age: 34
End: 2024-02-06
Payer: COMMERCIAL

## 2024-02-06 ENCOUNTER — APPOINTMENT (OUTPATIENT)
Dept: LAB | Facility: MEDICAL CENTER | Age: 34
End: 2024-02-06
Payer: COMMERCIAL

## 2024-02-06 VITALS — WEIGHT: 293 LBS | SYSTOLIC BLOOD PRESSURE: 118 MMHG | BODY MASS INDEX: 43 KG/M2 | DIASTOLIC BLOOD PRESSURE: 80 MMHG

## 2024-02-06 DIAGNOSIS — O26.643 INTRAHEPATIC CHOLESTASIS OF PREGNANCY IN THIRD TRIMESTER: ICD-10-CM

## 2024-02-06 DIAGNOSIS — O34.219 PREVIOUS CESAREAN SECTION COMPLICATING PREGNANCY: ICD-10-CM

## 2024-02-06 DIAGNOSIS — O26.613 CHOLESTASIS DURING PREGNANCY IN THIRD TRIMESTER: Primary | ICD-10-CM

## 2024-02-06 DIAGNOSIS — Z3A.34 34 WEEKS GESTATION OF PREGNANCY: ICD-10-CM

## 2024-02-06 DIAGNOSIS — O09.93 HIGH-RISK PREGNANCY, THIRD TRIMESTER: Primary | ICD-10-CM

## 2024-02-06 DIAGNOSIS — K83.1 CHOLESTASIS DURING PREGNANCY IN THIRD TRIMESTER: Primary | ICD-10-CM

## 2024-02-06 DIAGNOSIS — O09.93 HIGH-RISK PREGNANCY, THIRD TRIMESTER: ICD-10-CM

## 2024-02-06 DIAGNOSIS — Z87.59 HISTORY OF PLACENTA ABRUPTION: ICD-10-CM

## 2024-02-06 DIAGNOSIS — B95.1 POSITIVE GBS TEST: ICD-10-CM

## 2024-02-06 DIAGNOSIS — O99.210 OBESITY IN PREGNANCY: ICD-10-CM

## 2024-02-06 DIAGNOSIS — R74.01 TRANSAMINITIS: ICD-10-CM

## 2024-02-06 DIAGNOSIS — O09.899 HISTORY OF PRETERM DELIVERY, CURRENTLY PREGNANT: ICD-10-CM

## 2024-02-06 LAB
ALBUMIN SERPL BCP-MCNC: 3.5 G/DL (ref 3.5–5)
ALP SERPL-CCNC: 150 U/L (ref 34–104)
ALT SERPL W P-5'-P-CCNC: 61 U/L (ref 7–52)
ANION GAP SERPL CALCULATED.3IONS-SCNC: 9 MMOL/L
AST SERPL W P-5'-P-CCNC: 38 U/L (ref 13–39)
BILIRUB SERPL-MCNC: 0.33 MG/DL (ref 0.2–1)
BUN SERPL-MCNC: 6 MG/DL (ref 5–25)
CALCIUM SERPL-MCNC: 9.1 MG/DL (ref 8.4–10.2)
CHLORIDE SERPL-SCNC: 105 MMOL/L (ref 96–108)
CO2 SERPL-SCNC: 23 MMOL/L (ref 21–32)
CREAT SERPL-MCNC: 0.5 MG/DL (ref 0.6–1.3)
GFR SERPL CREATININE-BSD FRML MDRD: 128 ML/MIN/1.73SQ M
GLUCOSE P FAST SERPL-MCNC: 102 MG/DL (ref 65–99)
POTASSIUM SERPL-SCNC: 4 MMOL/L (ref 3.5–5.3)
PROT SERPL-MCNC: 6.7 G/DL (ref 6.4–8.4)
SODIUM SERPL-SCNC: 137 MMOL/L (ref 135–147)

## 2024-02-06 PROCEDURE — 76815 OB US LIMITED FETUS(S): CPT | Performed by: OBSTETRICS & GYNECOLOGY

## 2024-02-06 PROCEDURE — 80053 COMPREHEN METABOLIC PANEL: CPT

## 2024-02-06 PROCEDURE — 82240 BILE ACIDS CHOLYLGLYCINE: CPT

## 2024-02-06 PROCEDURE — 59025 FETAL NON-STRESS TEST: CPT | Performed by: OBSTETRICS & GYNECOLOGY

## 2024-02-06 PROCEDURE — PBNCHG PB NO CHARGE PLACEHOLDER: Performed by: OBSTETRICS & GYNECOLOGY

## 2024-02-06 PROCEDURE — 36415 COLL VENOUS BLD VENIPUNCTURE: CPT

## 2024-02-06 PROCEDURE — PNV: Performed by: OBSTETRICS & GYNECOLOGY

## 2024-02-06 NOTE — PROGRESS NOTES
Assessment  33 y.o.  at 34w3d presenting for routine prenatal visit. New dx of cholestasis, risk of stillbirth, need for monitoring (labs, APFS), and alteration to delivery timing recs reviewed.    Plan  Diagnoses and all orders for this visit:    High-risk pregnancy, third trimester  34 weeks gestation of pregnancy  - PTL precautions  - FKC  - Stop ASA 35wks due to delivery recs  - Return 2-3d for NST    Intrahepatic cholestasis of pregnancy in third trimester  -     Comprehensive metabolic panel; Standing  -     Bile acids, total; Standing  - Weekly labs  - Twice weekly NST, weekly RACHELL (both scheduled here this week, seeing MFM next week and will alternate)  - Delivery 36wk-37wk (with mild bile acid elevation presently, rec 37wk and scheduled)    Previous  section complicating pregnancy  - RLTCS rescheduled  at 7:30am    History of placenta abruption  History of  delivery, currently pregnant  Obesity in pregnancy  - Follows with MFM  - Ongoing growth surveillance    Positive GBS test  - PPx if laboring (plans RLTCS)    ____________________________________________________________        Subjective    Sarah Moira Napoles is a 33 y.o.  at 34w3d who presents for routine prenatal visit. She is noting anxiety regarding hx and recent dx cholestasis. Still itching on ursidiol, but also having a little GI upset and thinks related. Denies contractions, loss of fluid, or vaginal bleeding. She has less movements than baseline, but still present and regular.     Pregnancy Problems:  Patient Active Problem List   Diagnosis    ADD (attention deficit disorder) without hyperactivity    Generalized anxiety disorder    PCOS (polycystic ovarian syndrome)    Mild episode of recurrent major depressive disorder (HCC)    Positive GBS test    Depression during pregnancy in first trimester    History of placenta abruption    Previous  section complicating pregnancy    34 weeks gestation of  pregnancy    Obesity in pregnancy    History of  delivery, currently pregnant    Intrahepatic cholestasis of pregnancy         Objective  /80   Wt 136 kg (299 lb 11.2 oz)   LMP 2023 (Approximate)   BMI 43.00 kg/m²     NST:  [place]:  RACHELL: 145 BPM / moderate / +accels, reactive  Quiet  10.0cm   Uterine Size: size equals dates     Physical Exam:  Physical Exam  Constitutional:       General: She is not in acute distress.     Appearance: Normal appearance. She is well-developed. She is not ill-appearing, toxic-appearing or diaphoretic.   HENT:      Head: Normocephalic and atraumatic.   Eyes:      General: No scleral icterus.        Right eye: No discharge.         Left eye: No discharge.      Conjunctiva/sclera: Conjunctivae normal.   Pulmonary:      Effort: Pulmonary effort is normal. No accessory muscle usage or respiratory distress.   Abdominal:      General: There is distension (gravid).      Tenderness: There is no abdominal tenderness. There is no guarding or rebound.   Skin:     General: Skin is warm and dry.      Coloration: Skin is not jaundiced.      Findings: No bruising, erythema or rash.   Neurological:      Mental Status: She is alert.   Psychiatric:         Mood and Affect: Mood normal.         Behavior: Behavior normal.         Thought Content: Thought content normal.         Judgment: Judgment normal.

## 2024-02-06 NOTE — TELEPHONE ENCOUNTER
Patient scheduled for RLTCS on 2/26 at 0730. Provider made aware.    ----- Message from Mona Montana MD sent at 2/6/2024  8:38 AM EST -----  Regarding: reschedule c/s  Sarah developed cholestasis and needs delivery at 37wks. Rec as early in 37th week as possible, 2/26 preferred if available. Seeing her today; let me know if completed and I will inform pt.

## 2024-02-08 ENCOUNTER — ROUTINE PRENATAL (OUTPATIENT)
Dept: OBGYN CLINIC | Facility: MEDICAL CENTER | Age: 34
End: 2024-02-08
Payer: COMMERCIAL

## 2024-02-08 VITALS — BODY MASS INDEX: 28.12 KG/M2 | DIASTOLIC BLOOD PRESSURE: 78 MMHG | SYSTOLIC BLOOD PRESSURE: 122 MMHG | WEIGHT: 196 LBS

## 2024-02-08 DIAGNOSIS — O26.643 INTRAHEPATIC CHOLESTASIS OF PREGNANCY IN THIRD TRIMESTER: Primary | ICD-10-CM

## 2024-02-08 DIAGNOSIS — Z3A.34 34 WEEKS GESTATION OF PREGNANCY: Primary | ICD-10-CM

## 2024-02-08 DIAGNOSIS — O34.219 PREVIOUS CESAREAN SECTION COMPLICATING PREGNANCY: ICD-10-CM

## 2024-02-08 DIAGNOSIS — O26.643 INTRAHEPATIC CHOLESTASIS OF PREGNANCY IN THIRD TRIMESTER: ICD-10-CM

## 2024-02-08 LAB — BILE AC SERPL-SCNC: 7 UMOL/L (ref 0–10)

## 2024-02-08 PROCEDURE — PNV: Performed by: OBSTETRICS & GYNECOLOGY

## 2024-02-08 PROCEDURE — 59025 FETAL NON-STRESS TEST: CPT | Performed by: OBSTETRICS & GYNECOLOGY

## 2024-02-09 ENCOUNTER — TELEPHONE (OUTPATIENT)
Dept: OBGYN CLINIC | Facility: MEDICAL CENTER | Age: 34
End: 2024-02-09

## 2024-02-09 ENCOUNTER — HOSPITAL ENCOUNTER (OUTPATIENT)
Facility: HOSPITAL | Age: 34
Setting detail: SURGERY ADMIT
Discharge: HOME | End: 2024-02-09
Attending: STUDENT IN AN ORGANIZED HEALTH CARE EDUCATION/TRAINING PROGRAM | Admitting: STUDENT IN AN ORGANIZED HEALTH CARE EDUCATION/TRAINING PROGRAM
Payer: COMMERCIAL

## 2024-02-09 VITALS
RESPIRATION RATE: 18 BRPM | WEIGHT: 293 LBS | TEMPERATURE: 98.3 F | HEART RATE: 103 BPM | BODY MASS INDEX: 41.95 KG/M2 | DIASTOLIC BLOOD PRESSURE: 67 MMHG | HEIGHT: 70 IN | SYSTOLIC BLOOD PRESSURE: 115 MMHG

## 2024-02-09 DIAGNOSIS — O23.43 URINARY TRACT INFECTION IN MOTHER DURING THIRD TRIMESTER OF PREGNANCY: Primary | ICD-10-CM

## 2024-02-09 PROBLEM — O26.643 CHOLESTASIS DURING PREGNANCY IN THIRD TRIMESTER: Status: ACTIVE | Noted: 2024-02-03

## 2024-02-09 PROBLEM — O26.613 CHOLESTASIS DURING PREGNANCY IN THIRD TRIMESTER: Status: ACTIVE | Noted: 2024-02-03

## 2024-02-09 PROBLEM — K83.1 CHOLESTASIS DURING PREGNANCY IN THIRD TRIMESTER: Status: ACTIVE | Noted: 2024-02-03

## 2024-02-09 LAB
ALBUMIN SERPL BCP-MCNC: 3.4 G/DL (ref 3.5–5)
ALP SERPL-CCNC: 134 U/L (ref 34–104)
ALT SERPL W P-5'-P-CCNC: 32 U/L (ref 7–52)
AMORPH URATE CRY URNS QL MICRO: ABNORMAL
ANION GAP SERPL CALCULATED.3IONS-SCNC: 8 MMOL/L
AST SERPL W P-5'-P-CCNC: 16 U/L (ref 13–39)
BACTERIA UR QL AUTO: ABNORMAL /HPF
BILIRUB SERPL-MCNC: 0.29 MG/DL (ref 0.2–1)
BILIRUB UR QL STRIP: NEGATIVE
BUN SERPL-MCNC: 6 MG/DL (ref 5–25)
CALCIUM ALBUM COR SERPL-MCNC: 9.7 MG/DL (ref 8.3–10.1)
CALCIUM SERPL-MCNC: 9.2 MG/DL (ref 8.4–10.2)
CAOX CRY URNS QL MICRO: ABNORMAL /HPF
CHLORIDE SERPL-SCNC: 106 MMOL/L (ref 96–108)
CLARITY UR: ABNORMAL
CO2 SERPL-SCNC: 22 MMOL/L (ref 21–32)
COLOR UR: ABNORMAL
CREAT SERPL-MCNC: 0.58 MG/DL (ref 0.6–1.3)
GFR SERPL CREATININE-BSD FRML MDRD: 122 ML/MIN/1.73SQ M
GLUCOSE SERPL-MCNC: 97 MG/DL (ref 65–140)
GLUCOSE UR STRIP-MCNC: NEGATIVE MG/DL
HGB UR QL STRIP.AUTO: NEGATIVE
HYALINE CASTS #/AREA URNS LPF: ABNORMAL /LPF
KETONES UR STRIP-MCNC: NEGATIVE MG/DL
LEUKOCYTE ESTERASE UR QL STRIP: ABNORMAL
MUCOUS THREADS UR QL AUTO: ABNORMAL
NITRITE UR QL STRIP: NEGATIVE
NON-SQ EPI CELLS URNS QL MICRO: ABNORMAL /HPF
PH UR STRIP.AUTO: 7 [PH]
POTASSIUM SERPL-SCNC: 3.8 MMOL/L (ref 3.5–5.3)
PROT SERPL-MCNC: 6.8 G/DL (ref 6.4–8.4)
PROT UR STRIP-MCNC: NEGATIVE MG/DL
RBC #/AREA URNS AUTO: ABNORMAL /HPF
SODIUM SERPL-SCNC: 136 MMOL/L (ref 135–147)
SP GR UR STRIP.AUTO: 1.01 (ref 1–1.03)
UROBILINOGEN UR STRIP-ACNC: <2 MG/DL
WBC #/AREA URNS AUTO: ABNORMAL /HPF

## 2024-02-09 PROCEDURE — 81001 URINALYSIS AUTO W/SCOPE: CPT | Performed by: OBSTETRICS & GYNECOLOGY

## 2024-02-09 PROCEDURE — 82240 BILE ACIDS CHOLYLGLYCINE: CPT | Performed by: OBSTETRICS & GYNECOLOGY

## 2024-02-09 PROCEDURE — 99213 OFFICE O/P EST LOW 20 MIN: CPT

## 2024-02-09 PROCEDURE — 80053 COMPREHEN METABOLIC PANEL: CPT | Performed by: OBSTETRICS & GYNECOLOGY

## 2024-02-09 PROCEDURE — NC001 PR NO CHARGE: Performed by: OBSTETRICS & GYNECOLOGY

## 2024-02-09 RX ORDER — NITROFURANTOIN 25; 75 MG/1; MG/1
100 CAPSULE ORAL 2 TIMES DAILY
Qty: 14 CAPSULE | Refills: 0 | Status: SHIPPED | OUTPATIENT
Start: 2024-02-09 | End: 2024-02-18

## 2024-02-09 NOTE — TELEPHONE ENCOUNTER
Per call provider, patient to go to L&D for further evaluation. Patient aware of recommendations. Charge nurse made aware.     ----- Message from Sarah Napoles sent at 2/9/2024  3:31 PM EST -----  Regarding: New Symptoms, maybe?  Contact: [phone]  Ron Montana,    I was feeling really good the past two days, today... not so great. Anytime I stand up, or open my eyes wide i get stars like before you pass out. I don't feel like i'm actually going to faint, just off balance. My heartburn will not go away and my urine is super dark again even though it's been clear for the past two days. The itchiness is at bay for the most part it hasn't gotten any worse. I have my blood draw scheduled for tomorrow morning. Is there anything else i should be doing?

## 2024-02-09 NOTE — PROGRESS NOTES
Sarah is a 33 y.o. year old  at 34w6d for routine prenatal visit.   + FM, no vaginal bleeding, contractions, or LOF  Complaints: Yes itching mainly of hands and feet  - known cholestasis on Ursodiol   NST reactive  / has order for weekly labs and APFS scheduled   [facility] reviewed   Most recent ultrasound and labs reviewed.  Has repeat C/S scheduled

## 2024-02-10 ENCOUNTER — APPOINTMENT (OUTPATIENT)
Dept: LAB | Facility: MEDICAL CENTER | Age: 34
End: 2024-02-10
Payer: COMMERCIAL

## 2024-02-10 NOTE — PROGRESS NOTES
Triage Note - OB  Sarah Napoles 33 y.o. female MRN: [id number]  Unit/Bed#: L&D 329-02 Encounter: [id number]    Chief Complaint: my urine is darker today and I felt dizzy earlier    SUBJECTIVE    HPI: 33 y.o.  at 34w6d with c/o dark urine today which had not been the case prior. She denies any dysuria. She also noted feeling lightheaded earlier. She has been drinking some water throughout the day. She denies any HA, flank pain, SOB, swelling in extremities or epigastric pain. She denies any VB, LOF or ctx. Positive fetal movement. She also has cholestasis in this pregnancy and is due for labs tomorrow. The itching is starting to return, despite it going away previously with ursodiol.       OBJECTIVE  Estimated Date of Delivery: 3/16/24    Vitals: VSS  Vitals:    24 1810   BP: 102/68   Pulse: 103   Resp: 18    Temp: 98.3 °F (36.8 °C)     Body mass index is 42.33 kg/m².      FHR: 140s, reactive  [place]: no ctx    Physical Exam  Constitutional:       Appearance: Normal appearance.   HENT:      Head: Normocephalic and atraumatic.   Eyes:      Extraocular Movements: Extraocular movements intact.   Cardiovascular:      Pulses: Normal pulses.   Pulmonary:      Effort: Pulmonary effort is normal.   Abdominal:      Palpations: Abdomen is soft.      Tenderness: There is no abdominal tenderness. There is no guarding.      Comments: Gravid   Musculoskeletal:         General: Normal range of motion.      Right lower leg: No edema.      Left lower leg: No edema.      Comments: No CVA tenderness   Skin:     General: Skin is warm and dry.   Neurological:      Mental Status: She is alert. Mental status is at baseline.   Psychiatric:         Mood and Affect: Mood normal.         Behavior: Behavior normal.          Labs:   Admission on 2024   Component Date Value    Sodium 2024 136     Potassium 2024 3.8     Chloride 2024 106     CO2 2024 22     ANION GAP 2024 8     BUN  2024 6     Creatinine 2024 0.58 (L)     Glucose 2024 97     Calcium 2024 9.2     Corrected Calcium 2024 9.7     AST 2024 16     ALT 2024 32     Alkaline Phosphatase 2024 134 (H)     Total Protein 2024 6.8     Albumin 2024 3.4 (L)     Total Bilirubin 2024 0.29     eGFR 2024 122     Color, UA 2024 Light [place]     Clarity, UA 2024 Extra Turbid     Specific Gravity, UA 2024 1.012     pH, UA 2024 7.0     Leukocytes, UA 2024 Small (A)     Nitrite, UA 2024 Negative     Protein, UA 2024 Negative     Glucose, UA 2024 Negative     Ketones, UA 2024 Negative     Urobilinogen, UA 2024 <2.0     Bilirubin, UA 2024 Negative     Occult Blood, UA 2024 Negative     RBC, UA 2024 2-4 (A)     WBC, UA 2024 4-10 (A)     Epithelial Cells 2024 Moderate (A)     Bacteria, UA 2024 Innumerable (A)     MUCUS THREADS 2024 Occasional (A)     Hyaline Casts, UA 2024 0-3 (A)     Amorphous Crystals, UA 2024 Occasional     Ca Oxalate Lidia, UA 2024 Occasional (A)            A/P  33 y.o. female  at 34w6d with c/o dark urine, likely found to have a UTI from UA. Urine culture sent and Macrobid sent to pharmacy. She declined IV fluids and hydrated orally while in triage. CMP was collected and LFTs normalized. Bile acids collected, results pending. NST reactive.     Discussed with Dr. Luther: d/c home with labor precautions  Discharge instructions given to patient and labor precautions reviewed.    Mckenna Tinajero MD  OB-GYN, PGY-4  2024 7:46 PM

## 2024-02-12 ENCOUNTER — TELEPHONE (OUTPATIENT)
Dept: PERINATAL CARE | Facility: CLINIC | Age: 34
End: 2024-02-12

## 2024-02-12 LAB — BILE AC SERPL-SCNC: 4.1 UMOL/L (ref 0–10)

## 2024-02-12 NOTE — TELEPHONE ENCOUNTER
[facility].   Offered to r/s 2/14 or 2/15 end of day appointments @ [facility] [place]- patient states she is already scheduled with OB 2/15 and she will check with them if she needs to come to [facility] this week.  Next MFM f/u scheduled 2/20 growth/ NST.

## 2024-02-13 ENCOUNTER — TELEPHONE (OUTPATIENT)
Dept: OBGYN CLINIC | Facility: MEDICAL CENTER | Age: 34
End: 2024-02-13

## 2024-02-13 NOTE — TELEPHONE ENCOUNTER
----- Message from Mona Montana MD sent at 2/13/2024  1:58 PM EST -----  Regarding: RE: TATI Cancelled Apt due to snow  Contact: [phone]  Cholestasis and rec for 2x weekly surveillance. Had one recent on L&D and next sched 2/15, but if wants to bump up appt please assist. NST/RACHELL.    ----- Message -----  From: Funmi Veronica  Sent: 2/13/2024  11:52 AM EST  To: Mona Montana MD  Subject: FW: TATI Cancelled Apt due to snow                  ----- Message -----  From: Sarah Griffiths  Sent: 2/13/2024   8:58 AM EST  To: [facility]  Subject: TATI Cancelled Apt due to snow                    Hi Dr. Montana,     I hope you're staying warm and safe in the snow!     TATI cancelled the fluid check and the NST for this morning, I haven't gotten blood work/NST done since 2/10 at L&D everything came back great, except that I had a UTI which was why I thought I was feeling different/additional symptoms.     My next appointment isn't scheduled until 2/15. I just wanted to make sure that this is an appropriate course, or should I be trying to get in sooner/getting blood work done?

## 2024-02-15 ENCOUNTER — ROUTINE PRENATAL (OUTPATIENT)
Dept: OBGYN CLINIC | Facility: MEDICAL CENTER | Age: 34
End: 2024-02-15
Payer: COMMERCIAL

## 2024-02-15 ENCOUNTER — HOSPITAL ENCOUNTER (INPATIENT)
Facility: HOSPITAL | Age: 34
LOS: 3 days | Discharge: HOME | End: 2024-02-18
Attending: OBSTETRICS & GYNECOLOGY | Admitting: OBSTETRICS & GYNECOLOGY
Payer: COMMERCIAL

## 2024-02-15 ENCOUNTER — ANESTHESIA (INPATIENT)
Dept: LABOR AND DELIVERY | Facility: HOSPITAL | Age: 34
End: 2024-02-15
Payer: COMMERCIAL

## 2024-02-15 ENCOUNTER — ANESTHESIA EVENT (INPATIENT)
Dept: LABOR AND DELIVERY | Facility: HOSPITAL | Age: 34
End: 2024-02-15
Payer: COMMERCIAL

## 2024-02-15 VITALS — SYSTOLIC BLOOD PRESSURE: 128 MMHG | WEIGHT: 293 LBS | DIASTOLIC BLOOD PRESSURE: 82 MMHG | BODY MASS INDEX: 42.9 KG/M2

## 2024-02-15 DIAGNOSIS — F41.1 GENERALIZED ANXIETY DISORDER: ICD-10-CM

## 2024-02-15 DIAGNOSIS — O36.8120 DECREASED FETAL MOVEMENTS IN SECOND TRIMESTER, SINGLE OR UNSPECIFIED FETUS: ICD-10-CM

## 2024-02-15 DIAGNOSIS — Z98.891 STATUS POST REPEAT LOW TRANSVERSE CESAREAN SECTION: ICD-10-CM

## 2024-02-15 DIAGNOSIS — O26.613 CHOLESTASIS DURING PREGNANCY IN THIRD TRIMESTER: ICD-10-CM

## 2024-02-15 DIAGNOSIS — Z3A.35 35 WEEKS GESTATION OF PREGNANCY: Primary | ICD-10-CM

## 2024-02-15 DIAGNOSIS — K83.1 CHOLESTASIS DURING PREGNANCY IN THIRD TRIMESTER: Primary | ICD-10-CM

## 2024-02-15 DIAGNOSIS — O26.613 CHOLESTASIS DURING PREGNANCY IN THIRD TRIMESTER: Primary | ICD-10-CM

## 2024-02-15 DIAGNOSIS — O34.219 PREVIOUS CESAREAN SECTION COMPLICATING PREGNANCY: Primary | ICD-10-CM

## 2024-02-15 DIAGNOSIS — K83.1 CHOLESTASIS DURING PREGNANCY IN THIRD TRIMESTER: ICD-10-CM

## 2024-02-15 PROBLEM — O36.8130 DECREASED FETAL MOVEMENTS IN THIRD TRIMESTER: Status: ACTIVE | Noted: 2024-02-15

## 2024-02-15 LAB
ABO GROUP BLD: NORMAL
ALBUMIN SERPL BCP-MCNC: 3.5 G/DL (ref 3.5–5)
ALP SERPL-CCNC: 145 U/L (ref 34–104)
ALT SERPL W P-5'-P-CCNC: 15 U/L (ref 7–52)
ANION GAP SERPL CALCULATED.3IONS-SCNC: 10 MMOL/L
AST SERPL W P-5'-P-CCNC: 15 U/L (ref 13–39)
BASE EXCESS BLDCOA CALC-SCNC: -2.6 MMOL/L (ref 3–11)
BASE EXCESS BLDCOV CALC-SCNC: -3.5 MMOL/L (ref 1–9)
BASOPHILS # BLD AUTO: 0.04 THOUSANDS/ÂΜL (ref 0–0.1)
BASOPHILS NFR BLD AUTO: 0 % (ref 0–1)
BILIRUB SERPL-MCNC: 0.39 MG/DL (ref 0.2–1)
BLD GP AB SCN SERPL QL: NEGATIVE
BUN SERPL-MCNC: 6 MG/DL (ref 5–25)
CALCIUM SERPL-MCNC: 8.7 MG/DL (ref 8.4–10.2)
CHLORIDE SERPL-SCNC: 106 MMOL/L (ref 96–108)
CO2 SERPL-SCNC: 19 MMOL/L (ref 21–32)
CREAT SERPL-MCNC: 0.53 MG/DL (ref 0.6–1.3)
EOSINOPHIL # BLD AUTO: 0.04 THOUSAND/ÂΜL (ref 0–0.61)
EOSINOPHIL NFR BLD AUTO: 0 % (ref 0–6)
ERYTHROCYTE [DISTWIDTH] IN BLOOD BY AUTOMATED COUNT: 13.9 % (ref 11.6–15.1)
GFR SERPL CREATININE-BSD FRML MDRD: 126 ML/MIN/1.73SQ M
GLUCOSE SERPL-MCNC: 70 MG/DL (ref 65–140)
HCO3 BLDCOA-SCNC: 25 MMOL/L (ref 17.3–27.3)
HCO3 BLDCOV-SCNC: 22 MMOL/L (ref 12.2–28.6)
HCT VFR BLD AUTO: 33.5 % (ref 34.8–46.1)
HGB BLD-MCNC: 11.3 G/DL (ref 11.5–15.4)
HOLD SPECIMEN: NORMAL
IMM GRANULOCYTES # BLD AUTO: 0.08 THOUSAND/UL (ref 0–0.2)
IMM GRANULOCYTES NFR BLD AUTO: 1 % (ref 0–2)
LYMPHOCYTES # BLD AUTO: 2.13 THOUSANDS/ÂΜL (ref 0.6–4.47)
LYMPHOCYTES NFR BLD AUTO: 17 % (ref 14–44)
MCH RBC QN AUTO: 29.3 PG (ref 26.8–34.3)
MCHC RBC AUTO-ENTMCNC: 33.7 G/DL (ref 31.4–37.4)
MCV RBC AUTO: 87 FL (ref 82–98)
MONOCYTES # BLD AUTO: 0.8 THOUSAND/ÂΜL (ref 0.17–1.22)
MONOCYTES NFR BLD AUTO: 6 % (ref 4–12)
NEUTROPHILS # BLD AUTO: 9.64 THOUSANDS/ÂΜL (ref 1.85–7.62)
NEUTS SEG NFR BLD AUTO: 76 % (ref 43–75)
NRBC BLD AUTO-RTO: 0 /100 WBCS
O2 CT VFR BLDCOA CALC: 4.1 ML/DL
OXYHGB MFR BLDCOA: 27.6 %
OXYHGB MFR BLDCOV: 49.6 %
PCO2 BLDCOA: 57.7 MM[HG] (ref 30–60)
PCO2 BLDCOV: 41.4 MM HG (ref 27–43)
PH BLDCOA: 7.25 [PH] (ref 7.23–7.43)
PH BLDCOV: 7.34 [PH] (ref 7.19–7.49)
PLATELET # BLD AUTO: 234 THOUSANDS/UL (ref 149–390)
PMV BLD AUTO: 10.2 FL (ref 8.9–12.7)
PO2 BLDCOA: 13.9 MM HG (ref 5–25)
PO2 BLDCOV: 18.3 MM HG (ref 15–45)
POTASSIUM SERPL-SCNC: 3.8 MMOL/L (ref 3.5–5.3)
PROT SERPL-MCNC: 7 G/DL (ref 6.4–8.4)
RBC # BLD AUTO: 3.86 MILLION/UL (ref 3.81–5.12)
RH BLD: POSITIVE
SAO2 % BLDCOV: 7.8 ML/DL
SODIUM SERPL-SCNC: 135 MMOL/L (ref 135–147)
SPECIMEN EXPIRATION DATE: NORMAL
WBC # BLD AUTO: 12.73 THOUSAND/UL (ref 4.31–10.16)

## 2024-02-15 PROCEDURE — 99212 OFFICE O/P EST SF 10 MIN: CPT

## 2024-02-15 PROCEDURE — 86901 BLOOD TYPING SEROLOGIC RH(D): CPT | Performed by: OBSTETRICS & GYNECOLOGY

## 2024-02-15 PROCEDURE — PNV: Performed by: OBSTETRICS & GYNECOLOGY

## 2024-02-15 PROCEDURE — 86850 RBC ANTIBODY SCREEN: CPT | Performed by: OBSTETRICS & GYNECOLOGY

## 2024-02-15 PROCEDURE — 76815 OB US LIMITED FETUS(S): CPT | Performed by: OBSTETRICS & GYNECOLOGY

## 2024-02-15 PROCEDURE — NC001 PR NO CHARGE: Performed by: OBSTETRICS & GYNECOLOGY

## 2024-02-15 PROCEDURE — 86900 BLOOD TYPING SEROLOGIC ABO: CPT | Performed by: OBSTETRICS & GYNECOLOGY

## 2024-02-15 PROCEDURE — 85025 COMPLETE CBC W/AUTO DIFF WBC: CPT | Performed by: OBSTETRICS & GYNECOLOGY

## 2024-02-15 PROCEDURE — 87340 HEPATITIS B SURFACE AG IA: CPT | Performed by: OBSTETRICS & GYNECOLOGY

## 2024-02-15 PROCEDURE — 59025 FETAL NON-STRESS TEST: CPT | Performed by: OBSTETRICS & GYNECOLOGY

## 2024-02-15 PROCEDURE — 4A1HXCZ MONITORING OF PRODUCTS OF CONCEPTION, CARDIAC RATE, EXTERNAL APPROACH: ICD-10-PCS | Performed by: OBSTETRICS & GYNECOLOGY

## 2024-02-15 PROCEDURE — 80053 COMPREHEN METABOLIC PANEL: CPT | Performed by: OBSTETRICS & GYNECOLOGY

## 2024-02-15 PROCEDURE — 86780 TREPONEMA PALLIDUM: CPT | Performed by: OBSTETRICS & GYNECOLOGY

## 2024-02-15 PROCEDURE — 82805 BLOOD GASES W/O2 SATURATION: CPT | Performed by: OBSTETRICS & GYNECOLOGY

## 2024-02-15 PROCEDURE — 59510 CESAREAN DELIVERY: CPT | Performed by: OBSTETRICS & GYNECOLOGY

## 2024-02-15 RX ORDER — PHENYLEPHRINE HCL IN 0.9% NACL 1 MG/10 ML
SYRINGE (ML) INTRAVENOUS
Status: DISPENSED
Start: 2024-02-15 | End: 2024-02-16

## 2024-02-15 RX ORDER — MORPHINE SULFATE 0.5 MG/ML
INJECTION, SOLUTION EPIDURAL; INTRATHECAL; INTRAVENOUS
Status: COMPLETED
Start: 2024-02-15 | End: 2024-02-15

## 2024-02-15 RX ORDER — NITROFURANTOIN 25; 75 MG/1; MG/1
100 CAPSULE ORAL 2 TIMES DAILY
Status: COMPLETED | OUTPATIENT
Start: 2024-02-16 | End: 2024-02-16

## 2024-02-15 RX ORDER — FENTANYL CITRATE 50 UG/ML
INJECTION, SOLUTION INTRAMUSCULAR; INTRAVENOUS
Status: DISCONTINUED
Start: 2024-02-15 | End: 2024-02-15 | Stop reason: WASHOUT

## 2024-02-15 RX ORDER — OXYCODONE HYDROCHLORIDE 5 MG/1
10 TABLET ORAL EVERY 4 HOURS PRN
Status: DISCONTINUED | OUTPATIENT
Start: 2024-02-16 | End: 2024-02-18 | Stop reason: HOSPADM

## 2024-02-15 RX ORDER — KETOROLAC TROMETHAMINE 30 MG/ML
INJECTION, SOLUTION INTRAMUSCULAR; INTRAVENOUS
Status: COMPLETED
Start: 2024-02-15 | End: 2024-02-16

## 2024-02-15 RX ORDER — SODIUM CHLORIDE, SODIUM LACTATE, POTASSIUM CHLORIDE, CALCIUM CHLORIDE 600; 310; 30; 20 MG/100ML; MG/100ML; MG/100ML; MG/100ML
125 INJECTION, SOLUTION INTRAVENOUS CONTINUOUS
Status: DISCONTINUED | OUTPATIENT
Start: 2024-02-15 | End: 2024-02-18 | Stop reason: HOSPADM

## 2024-02-15 RX ORDER — SODIUM CHLORIDE, SODIUM LACTATE, POTASSIUM CHLORIDE, CALCIUM CHLORIDE 600; 310; 30; 20 MG/100ML; MG/100ML; MG/100ML; MG/100ML
125 INJECTION, SOLUTION INTRAVENOUS CONTINUOUS
Status: DISCONTINUED | OUTPATIENT
Start: 2024-02-15 | End: 2024-02-15

## 2024-02-15 RX ORDER — ONDANSETRON 2 MG/ML
4 INJECTION INTRAMUSCULAR; INTRAVENOUS EVERY 8 HOURS PRN
Status: DISCONTINUED | OUTPATIENT
Start: 2024-02-15 | End: 2024-02-15

## 2024-02-15 RX ORDER — ONDANSETRON 2 MG/ML
4 INJECTION INTRAMUSCULAR; INTRAVENOUS ONCE AS NEEDED
Status: DISCONTINUED | OUTPATIENT
Start: 2024-02-15 | End: 2024-02-18 | Stop reason: HOSPADM

## 2024-02-15 RX ORDER — OXYCODONE HYDROCHLORIDE 5 MG/1
5 TABLET ORAL EVERY 4 HOURS PRN
Status: DISCONTINUED | OUTPATIENT
Start: 2024-02-16 | End: 2024-02-18 | Stop reason: HOSPADM

## 2024-02-15 RX ORDER — IBUPROFEN 600 MG/1
600 TABLET ORAL EVERY 6 HOURS
Status: DISCONTINUED | OUTPATIENT
Start: 2024-02-17 | End: 2024-02-18 | Stop reason: HOSPADM

## 2024-02-15 RX ORDER — CEFAZOLIN SODIUM 2 G/50ML
2000 SOLUTION INTRAVENOUS ONCE
Status: COMPLETED | OUTPATIENT
Start: 2024-02-15 | End: 2024-02-15

## 2024-02-15 RX ORDER — HYDROMORPHONE HCL/PF 1 MG/ML
0.5 SYRINGE (ML) INJECTION
Status: DISCONTINUED | OUTPATIENT
Start: 2024-02-15 | End: 2024-02-18 | Stop reason: HOSPADM

## 2024-02-15 RX ORDER — PHENYLEPHRINE HYDROCHLORIDE 10 MG/ML
INJECTION INTRAVENOUS
Status: DISPENSED
Start: 2024-02-15 | End: 2024-02-16

## 2024-02-15 RX ORDER — OXYTOCIN/RINGER'S LACTATE 30/500 ML
PLASTIC BAG, INJECTION (ML) INTRAVENOUS
Status: COMPLETED
Start: 2024-02-15 | End: 2024-02-15

## 2024-02-15 RX ORDER — MORPHINE SULFATE 0.5 MG/ML
INJECTION, SOLUTION EPIDURAL; INTRATHECAL; INTRAVENOUS AS NEEDED
Status: DISCONTINUED | OUTPATIENT
Start: 2024-02-15 | End: 2024-02-15

## 2024-02-15 RX ORDER — SODIUM CHLORIDE 9 MG/ML
125 INJECTION, SOLUTION INTRAVENOUS CONTINUOUS
Status: DISCONTINUED | OUTPATIENT
Start: 2024-02-15 | End: 2024-02-15

## 2024-02-15 RX ORDER — KETOROLAC TROMETHAMINE 30 MG/ML
30 INJECTION, SOLUTION INTRAMUSCULAR; INTRAVENOUS EVERY 6 HOURS SCHEDULED
Status: COMPLETED | OUTPATIENT
Start: 2024-02-16 | End: 2024-02-17

## 2024-02-15 RX ORDER — KETOROLAC TROMETHAMINE 30 MG/ML
INJECTION, SOLUTION INTRAMUSCULAR; INTRAVENOUS AS NEEDED
Status: DISCONTINUED | OUTPATIENT
Start: 2024-02-15 | End: 2024-02-15

## 2024-02-15 RX ORDER — DOCUSATE SODIUM 100 MG/1
100 CAPSULE, LIQUID FILLED ORAL 2 TIMES DAILY
Status: DISCONTINUED | OUTPATIENT
Start: 2024-02-16 | End: 2024-02-18 | Stop reason: HOSPADM

## 2024-02-15 RX ORDER — NALOXONE HYDROCHLORIDE 0.4 MG/ML
0.1 INJECTION, SOLUTION INTRAMUSCULAR; INTRAVENOUS; SUBCUTANEOUS
Status: ACTIVE | OUTPATIENT
Start: 2024-02-15 | End: 2024-02-16

## 2024-02-15 RX ORDER — OXYTOCIN/RINGER'S LACTATE 30/500 ML
62.5 PLASTIC BAG, INJECTION (ML) INTRAVENOUS ONCE
Status: COMPLETED | OUTPATIENT
Start: 2024-02-15 | End: 2024-02-15

## 2024-02-15 RX ORDER — BUPIVACAINE HYDROCHLORIDE 7.5 MG/ML
INJECTION, SOLUTION INTRASPINAL AS NEEDED
Status: DISCONTINUED | OUTPATIENT
Start: 2024-02-15 | End: 2024-02-15

## 2024-02-15 RX ORDER — OXYTOCIN/RINGER'S LACTATE 30/500 ML
PLASTIC BAG, INJECTION (ML) INTRAVENOUS CONTINUOUS PRN
Status: DISCONTINUED | OUTPATIENT
Start: 2024-02-15 | End: 2024-02-15

## 2024-02-15 RX ORDER — CALCIUM CARBONATE 500 MG/1
1000 TABLET, CHEWABLE ORAL DAILY PRN
Status: DISCONTINUED | OUTPATIENT
Start: 2024-02-15 | End: 2024-02-18 | Stop reason: HOSPADM

## 2024-02-15 RX ORDER — ONDANSETRON 2 MG/ML
INJECTION INTRAMUSCULAR; INTRAVENOUS AS NEEDED
Status: DISCONTINUED | OUTPATIENT
Start: 2024-02-15 | End: 2024-02-15

## 2024-02-15 RX ORDER — ENOXAPARIN SODIUM 100 MG/ML
40 INJECTION SUBCUTANEOUS EVERY 12 HOURS
Status: DISCONTINUED | OUTPATIENT
Start: 2024-02-16 | End: 2024-02-18 | Stop reason: HOSPADM

## 2024-02-15 RX ORDER — FENTANYL CITRATE/PF 50 MCG/ML
50 SYRINGE (ML) INJECTION
Status: DISCONTINUED | OUTPATIENT
Start: 2024-02-15 | End: 2024-02-18 | Stop reason: HOSPADM

## 2024-02-15 RX ORDER — ACETAMINOPHEN 325 MG/1
650 TABLET ORAL EVERY 6 HOURS SCHEDULED
Status: DISCONTINUED | OUTPATIENT
Start: 2024-02-16 | End: 2024-02-18 | Stop reason: HOSPADM

## 2024-02-15 RX ADMIN — SODIUM CHLORIDE, SODIUM LACTATE, POTASSIUM CHLORIDE, AND CALCIUM CHLORIDE 1000 ML: .6; .31; .03; .02 INJECTION, SOLUTION INTRAVENOUS at 15:50

## 2024-02-15 RX ADMIN — PHENYLEPHRINE HYDROCHLORIDE 200 MCG: 10 INJECTION INTRAVENOUS at 20:23

## 2024-02-15 RX ADMIN — KETOROLAC TROMETHAMINE 30 MG: 30 INJECTION, SOLUTION INTRAMUSCULAR; INTRAVENOUS at 21:05

## 2024-02-15 RX ADMIN — Medication 30 UNITS: at 21:25

## 2024-02-15 RX ADMIN — PHENYLEPHRINE HYDROCHLORIDE 40 MCG/MIN: 10 INJECTION INTRAVENOUS at 20:10

## 2024-02-15 RX ADMIN — Medication 500 MG: at 20:16

## 2024-02-15 RX ADMIN — Medication 250 MILLI-UNITS/MIN: at 20:37

## 2024-02-15 RX ADMIN — BUPIVACAINE HYDROCHLORIDE IN DEXTROSE 1.6 ML: 7.5 INJECTION, SOLUTION SUBARACHNOID at 20:08

## 2024-02-15 RX ADMIN — CEFAZOLIN SODIUM 2000 MG: 2 SOLUTION INTRAVENOUS at 20:04

## 2024-02-15 RX ADMIN — PHENYLEPHRINE HYDROCHLORIDE 200 MCG: 10 INJECTION INTRAVENOUS at 20:14

## 2024-02-15 RX ADMIN — MORPHINE SULFATE 0.15 MG: 0.5 INJECTION, SOLUTION EPIDURAL; INTRATHECAL; INTRAVENOUS at 20:08

## 2024-02-15 RX ADMIN — ONDANSETRON 4 MG: 2 INJECTION INTRAMUSCULAR; INTRAVENOUS at 20:14

## 2024-02-15 RX ADMIN — SODIUM CHLORIDE, SODIUM LACTATE, POTASSIUM CHLORIDE, AND CALCIUM CHLORIDE: .6; .31; .03; .02 INJECTION, SOLUTION INTRAVENOUS at 21:14

## 2024-02-15 RX ADMIN — SODIUM CHLORIDE, SODIUM LACTATE, POTASSIUM CHLORIDE, AND CALCIUM CHLORIDE 125 ML/HR: .6; .31; .03; .02 INJECTION, SOLUTION INTRAVENOUS at 17:30

## 2024-02-15 NOTE — ANESTHESIA PREPROCEDURE EVALUATION
Procedure:   SECTION () REPEAT (Uterus)    Relevant Problems   GI/HEPATIC   (+) Cholestasis during pregnancy in third trimester      GYN   (+) 35 weeks gestation of pregnancy      NEURO/PSYCH   (+) Depression during pregnancy in first trimester   (+) Generalized anxiety disorder   (+) Mild episode of recurrent major depressive disorder (HCC)      Other   (+) Obesity in pregnancy        Physical Exam    Airway    Mallampati score: III  TM Distance: >3 FB  Neck ROM: full     Dental       Cardiovascular  Rhythm: regular, Rate: normal, Cardiovascular exam normal    Pulmonary  Pulmonary exam normal Breath sounds clear to auscultation    Other Findings  post-pubertal.      Anesthesia Plan  ASA Score- 3     Anesthesia Type- spinal with ASA Monitors.         Additional Monitors:     Airway Plan:     Comment: duramorph.       Plan Factors-Exercise tolerance (METS): >4 METS.    Chart reviewed.   Existing labs reviewed.     Patient is not a current smoker.      Obstructive sleep apnea risk education given perioperatively.        Induction-     Postoperative Plan-     Informed Consent- Anesthetic plan and risks discussed with patient.

## 2024-02-15 NOTE — PROGRESS NOTES
Sarah is a 33 y.o. year old  at 35w5d for routine prenatal visit.   + FM, no vaginal bleeding, contractions, or LOF  Complaints: Yes - feels movement is slower than before and worries about this , states does not want to over worry but also desires to let us know .      Known cholestasis of pregnancy  - having weekly labs and there has been improvement on bile acid since initiation of Ursodiol/ Has been having weekly RACHELL and  2 times weekly NST   On todays NST reactive strip  / however  RACHELL 5.52 - sent patient to triage for further evaluation and possible need for  delivery  vs antepartum admission / we briefly discussed  betamethasone / aware will need GBS collected as well    Currently scheduled for  repeat C/S on     On call provider notified patient being sent to triage    Most recent ultrasound and labs reviewed

## 2024-02-15 NOTE — PLAN OF CARE
Problem: BIRTH - VAGINAL/ SECTION  Goal: Fetal and maternal status remain reassuring during the birth process  Description: INTERVENTIONS:  - Monitor vital signs  - Monitor fetal heart rate  - Monitor uterine activity  - Monitor labor progression (vaginal delivery)  - DVT prophylaxis  - Antibiotic prophylaxis  Outcome: Progressing  Goal: Emotionally satisfying birthing experience for mother/fetus  Description: Interventions:  - Assess, plan, implement and evaluate the nursing care given to the patient in labor  - Advocate the philosophy that each childbirth experience is a unique experience and support the family's chosen level of involvement and control during the labor process   - Actively participate in both the patient's and family's teaching of the birth process  - Consider cultural, [employer] and age-specific factors and plan care for the patient in labor  Outcome: Progressing     Problem: PAIN - ADULT  Goal: Verbalizes/displays adequate comfort level or baseline comfort level  Description: Interventions:  - Encourage patient to monitor pain and request assistance  - Assess pain using appropriate pain scale  - Administer analgesics based on type and severity of pain and evaluate response  - Implement non-pharmacological measures as appropriate and evaluate response  - Consider cultural and social influences on pain and pain management  - Notify physician/advanced practitioner if interventions unsuccessful or patient reports new pain  Outcome: Progressing     Problem: INFECTION - ADULT  Goal: Absence or prevention of progression during hospitalization  Description: INTERVENTIONS:  - Assess and monitor for signs and symptoms of infection  - Monitor lab/diagnostic results  - Monitor all insertion sites, i.e. indwelling lines, tubes, and drains  - Monitor endotracheal if appropriate and nasal secretions for changes in amount and color  - [place] appropriate cooling/warming therapies per order  -  Administer medications as ordered  - Instruct and encourage patient and family to use good hand hygiene technique  - Identify and instruct in appropriate isolation precautions for identified infection/condition  Outcome: Progressing  Goal: Absence of fever/infection during neutropenic period  Description: INTERVENTIONS:  - Monitor WBC    Outcome: Progressing     Problem: SAFETY ADULT  Goal: Patient will remain free of falls  Description: INTERVENTIONS:  - Educate patient/family on patient safety including physical limitations  - Instruct patient to call for assistance with activity   - Consult OT/PT to assist with strengthening/mobility   - Keep Call bell within reach  - Keep bed low and locked with side rails adjusted as appropriate  - Keep care items and personal belongings within reach  - Initiate and maintain comfort rounds  - Make Fall Risk Sign visible to staff  - Apply yellow socks and bracelet for high fall risk patients  - Consider moving patient to room near nurses station  Outcome: Progressing  Goal: Maintain or return to baseline ADL function  Description: INTERVENTIONS:  -  Assess patient's ability to carry out ADLs; assess patient's baseline for ADL function and identify physical deficits which impact ability to perform ADLs (bathing, care of mouth/teeth, toileting, grooming, dressing, etc.)  - Assess/evaluate cause of self-care deficits   - Assess range of motion  - Assess patient's mobility; develop plan if impaired  - Assess patient's need for assistive devices and provide as appropriate  - Encourage maximum independence but intervene and supervise when necessary  - Involve family in performance of ADLs  - Assess for home care needs following discharge   - Consider OT consult to assist with ADL evaluation and planning for discharge  - Provide patient education as appropriate  Outcome: Progressing  Goal: Maintains/Returns to pre admission functional level  Description: INTERVENTIONS:  - Perform  AM-PAC 6 Click Basic Mobility/ Daily Activity assessment daily.  - Set and communicate daily mobility goal to care team and patient/family/caregiver.   - Collaborate with rehabilitation services on mobility goals if consulted  - Out of bed for toileting  - Record patient progress and toleration of activity level   Outcome: Progressing     Problem: DISCHARGE PLANNING  Goal: Discharge to home or other facility with appropriate resources  Description: INTERVENTIONS:  - Identify barriers to discharge w/patient and caregiver  - Arrange for needed discharge resources and transportation as appropriate  - Identify discharge learning needs (meds, wound care, etc.)  - Arrange for interpretive services to assist at discharge as needed  - Refer to Case Management Department for coordinating discharge planning if the patient needs post-hospital services based on physician/advanced practitioner order or complex needs related to functional status, cognitive ability, or social support system  Outcome: Progressing     Problem: Knowledge Deficit  Goal: Patient/family/caregiver demonstrates understanding of disease process, treatment plan, medications, and discharge instructions  Description: Complete learning assessment and assess knowledge base.  Interventions:  - Provide teaching at level of understanding  - Provide teaching via preferred learning methods  Outcome: Progressing

## 2024-02-15 NOTE — H&P
H & P- Obstetrics   Sarah Napoles 33 y.o. female MRN: [id number]  Unit/Bed#: -01 Encounter: [id number]    Assessment: 33 y.o.  at 35w5d admitted for PPROM, oligohydramnios, ICP, prior C/S for repeat.  FHT: 140s  Clinical EFW: 6.5 ; Cephalic confirmed by US  GBS status: positive       Plan:   Cholestasis during pregnancy in third trimester  Assessment & Plan  On Urosdiol    Previous  section complicating pregnancy  Assessment & Plan  Plan for Repeat C/S  Ancef, azithro    Positive GBS test  Assessment & Plan  Bacteruria        Chief Complaint: decreased fetal movement, sent in from office with decreased RACHELL    HPI: Sarah Napoles is a 33 y.o.  with an LEONARDA of 3/16/2024, by Ultrasound at 35w5d who is being admitted for  premature rupture of membranes. She denies having uterine contractions, has no LOF, and reports no VB. She states there is decreased FM.     Patient Active Problem List   Diagnosis    ADD (attention deficit disorder) without hyperactivity    Generalized anxiety disorder     premature rupture of membranes in third trimester    PCOS (polycystic ovarian syndrome)    Mild episode of recurrent major depressive disorder (HCC)    Positive GBS test    Depression during pregnancy in first trimester    History of placenta abruption    Previous  section complicating pregnancy    35 weeks gestation of pregnancy    Obesity in pregnancy    History of  delivery, currently pregnant    Cholestasis during pregnancy in third trimester    Urinary tract infection in mother during third trimester of pregnancy    Decreased fetal movements in third trimester       Baby complications/comments: none    Review of Systems   Constitutional: Negative.    Respiratory: Negative.     Cardiovascular: Negative.    Gastrointestinal: Negative.    Genitourinary: Negative.    Musculoskeletal: Negative.    All other systems reviewed and are negative.      OB Hx:  OB  History    Para Term  AB Living   4 1 1 0 2 1   SAB IAB Ectopic Multiple Live Births   1 0 0 0 1      # Outcome Date GA Lbr Geovany/2nd Weight Sex Delivery Anes PTL Lv   4 Current            3 Term 21 40w1d  4000 g (8 lb 13.1 oz) F CS-LTranv EPI N KE   2 SAB      SAB         Birth Comments: early first trimester SAB   1 AB 19 15w3d       FD      Birth Comments: at 14 w6d and then due to poor prognosis requested at 15w2d D&E      Complications: Abruptio Placenta,  premature rupture of membranes (PPROM) with unknown onset of labor       Past Medical Hx:  Past Medical History:   Diagnosis Date    Alcohol abuse     in past    Anxiety     Arthritis     bilateral hips    Cyst of breast, left     Depression     Female infertility     Polycystic ovary syndrome     Varicella        Past Surgical hx:  Past Surgical History:   Procedure Laterality Date    DILATION AND EVACUATION  2019    MS  DELIVERY ONLY N/A 2021    Procedure:  SECTION ();  Surgeon: Vivian Rosales MD;  Location: [facility] ;  Service: Obstetrics    MS INDUCED  DILATION & EVACUATION N/A 2019    Procedure: DILATATION AND EVACUATION (D&E) 15 Weeks;  Surgeon: Aileen Gorman MD;  Location: [facility];  Service: Gynecology    TONSILLECTOMY         Social Hx:  Alcohol use: none  Tobacco use: none  Other substance use: none    No Known Allergies    Medications Prior to Admission   Medication    Aspirin Low Dose 81 MG chewable tablet    Doxylamine Succinate, Sleep, (UNISOM PO)    loratadine (CLARITIN) 10 mg tablet    nitrofurantoin (MACROBID) 100 mg capsule    Prenatal MV-Min-Fe Fum-FA-DHA (PRENATAL 1 PO)    sertraline (ZOLOFT) 50 mg tablet    ursodiol (ACTIGALL) 300 mg capsule       Objective:  Temp:  [98.6 °F (37 °C)] 98.6 °F (37 °C)  HR:  [96] 96  Resp:  [18] 18  BP: (122-128)/(71-82) 122/71  There is no height or weight on file to calculate BMI.     Physical Exam:  Physical  Exam  Constitutional:       Appearance: Normal appearance.   Genitourinary:      Vulva and bladder normal.      Genitourinary Comments: Nitrazine +  Fern +      Enlarged: gravid.      No urethral prolapse present.      Pelvic exam was performed with patient in the lithotomy position.   Breasts:     Right: Normal. No mass, nipple discharge or skin change.      Left: Normal. No mass, nipple discharge or skin change.   HENT:      Head: Normocephalic.      Nose: Nose normal.   Cardiovascular:      Rate and Rhythm: Normal rate.      Pulses: Normal pulses.   Pulmonary:      Effort: Pulmonary effort is normal.   Abdominal:      General: Distension: gravid.      Hernia: There is no hernia in the left inguinal area or right inguinal area.   Musculoskeletal:         General: Normal range of motion.      Cervical back: Normal range of motion.   Lymphadenopathy:      Upper Body:      Right upper body: No axillary adenopathy.      Left upper body: No axillary adenopathy.   Neurological:      Mental Status: She is alert.   Skin:     General: Skin is warm.   Psychiatric:         Mood and Affect: Mood normal.         Behavior: Behavior normal.   Vitals reviewed.            FHT:  130s    TOCO:   occasional    Lab Results   Component Value Date    WBC 8.2 02/01/2024    HGB 11.2 02/01/2024    HCT 33.5 (L) 02/01/2024     02/01/2024     Lab Results   Component Value Date    K 3.8 02/09/2024     02/09/2024    CO2 22 02/09/2024    BUN 6 02/09/2024    CREATININE 0.58 (L) 02/09/2024    AST 16 02/09/2024    ALT 32 02/09/2024     Prenatal Labs: Reviewed      Blood type: O+  Antibody: neg  GBS: pos  HIV: negative  Rubella: immune  Syphilis IgM/IgG: nonreactive  HBsAg: not done  HCAb: nonreactive  Chlamydia: neg  Gonorrhea: neg  Diabetes 1 hour screen: 104  3 hour glucose: not needed  Platelets: pending  Hgb: pending  >2 Midnights  INPATIENT     Signature/Title: Neeta Juarez MD  Date: 2/15/2024  Time: 3:34 PM

## 2024-02-16 LAB
ERYTHROCYTE [DISTWIDTH] IN BLOOD BY AUTOMATED COUNT: 14.1 % (ref 11.6–15.1)
HBV SURFACE AG SER QL: NORMAL
HBV SURFACE AG SER QL: NORMAL
HCT VFR BLD AUTO: 30.7 % (ref 34.8–46.1)
HGB BLD-MCNC: 10 G/DL (ref 11.5–15.4)
MCH RBC QN AUTO: 29.7 PG (ref 26.8–34.3)
MCHC RBC AUTO-ENTMCNC: 32.6 G/DL (ref 31.4–37.4)
MCV RBC AUTO: 91 FL (ref 82–98)
PLATELET # BLD AUTO: 211 THOUSANDS/UL (ref 149–390)
PMV BLD AUTO: 10.3 FL (ref 8.9–12.7)
RBC # BLD AUTO: 3.37 MILLION/UL (ref 3.81–5.12)
TREPONEMA PALLIDUM IGG+IGM AB [PRESENCE] IN SERUM OR PLASMA BY IMMUNOASSAY: NORMAL
WBC # BLD AUTO: 11.09 THOUSAND/UL (ref 4.31–10.16)

## 2024-02-16 PROCEDURE — 99024 POSTOP FOLLOW-UP VISIT: CPT | Performed by: OBSTETRICS & GYNECOLOGY

## 2024-02-16 PROCEDURE — 85027 COMPLETE CBC AUTOMATED: CPT

## 2024-02-16 PROCEDURE — 87340 HEPATITIS B SURFACE AG IA: CPT

## 2024-02-16 RX ORDER — LORATADINE 10 MG/1
10 TABLET ORAL DAILY
Status: DISCONTINUED | OUTPATIENT
Start: 2024-02-16 | End: 2024-02-18 | Stop reason: HOSPADM

## 2024-02-16 RX ORDER — DIPHENHYDRAMINE HYDROCHLORIDE 50 MG/ML
25 INJECTION INTRAMUSCULAR; INTRAVENOUS EVERY 6 HOURS PRN
Status: DISCONTINUED | OUTPATIENT
Start: 2024-02-16 | End: 2024-02-18 | Stop reason: HOSPADM

## 2024-02-16 RX ADMIN — KETOROLAC TROMETHAMINE 30 MG: 30 INJECTION, SOLUTION INTRAMUSCULAR; INTRAVENOUS at 20:43

## 2024-02-16 RX ADMIN — DOCUSATE SODIUM 100 MG: 100 CAPSULE, LIQUID FILLED ORAL at 09:01

## 2024-02-16 RX ADMIN — KETOROLAC TROMETHAMINE 30 MG: 30 INJECTION, SOLUTION INTRAMUSCULAR; INTRAVENOUS at 09:03

## 2024-02-16 RX ADMIN — ACETAMINOPHEN 325MG 650 MG: 325 TABLET ORAL at 12:04

## 2024-02-16 RX ADMIN — DOCUSATE SODIUM 100 MG: 100 CAPSULE, LIQUID FILLED ORAL at 18:36

## 2024-02-16 RX ADMIN — SODIUM CHLORIDE, SODIUM LACTATE, POTASSIUM CHLORIDE, AND CALCIUM CHLORIDE 125 ML/HR: .6; .31; .03; .02 INJECTION, SOLUTION INTRAVENOUS at 01:12

## 2024-02-16 RX ADMIN — SERTRALINE HYDROCHLORIDE 50 MG: 50 TABLET ORAL at 09:02

## 2024-02-16 RX ADMIN — ENOXAPARIN SODIUM 40 MG: 40 INJECTION SUBCUTANEOUS at 20:43

## 2024-02-16 RX ADMIN — KETOROLAC TROMETHAMINE 30 MG: 30 INJECTION, SOLUTION INTRAMUSCULAR; INTRAVENOUS at 03:12

## 2024-02-16 RX ADMIN — ACETAMINOPHEN 325MG 650 MG: 325 TABLET ORAL at 00:31

## 2024-02-16 RX ADMIN — LORATADINE 10 MG: 10 TABLET ORAL at 09:02

## 2024-02-16 RX ADMIN — NITROFURANTOIN MONOHYDRATE/MACROCRYSTALS 100 MG: 25; 75 CAPSULE ORAL at 18:36

## 2024-02-16 RX ADMIN — NITROFURANTOIN MONOHYDRATE/MACROCRYSTALS 100 MG: 25; 75 CAPSULE ORAL at 09:01

## 2024-02-16 RX ADMIN — ACETAMINOPHEN 325MG 650 MG: 325 TABLET ORAL at 06:23

## 2024-02-16 RX ADMIN — DOXYLAMINE SUCCINATE 12.5 MG: 25 TABLET ORAL at 01:07

## 2024-02-16 RX ADMIN — DIPHENHYDRAMINE HYDROCHLORIDE 25 MG: 50 INJECTION, SOLUTION INTRAMUSCULAR; INTRAVENOUS at 01:06

## 2024-02-16 RX ADMIN — ACETAMINOPHEN 325MG 650 MG: 325 TABLET ORAL at 18:36

## 2024-02-16 RX ADMIN — ENOXAPARIN SODIUM 40 MG: 40 INJECTION SUBCUTANEOUS at 09:01

## 2024-02-16 RX ADMIN — SODIUM CHLORIDE, SODIUM LACTATE, POTASSIUM CHLORIDE, AND CALCIUM CHLORIDE 1000 ML: .6; .31; .03; .02 INJECTION, SOLUTION INTRAVENOUS at 09:51

## 2024-02-16 RX ADMIN — KETOROLAC TROMETHAMINE 30 MG: 30 INJECTION, SOLUTION INTRAMUSCULAR; INTRAVENOUS at 15:12

## 2024-02-16 NOTE — ANESTHESIA POSTPROCEDURE EVALUATION
"Post-Op Assessment Note    CV Status:  Stable    Pain management: adequate       Mental Status:  Alert and awake   Hydration Status:  Euvolemic   PONV Controlled:  Controlled   Airway Patency:  Patent     Post Op Vitals Reviewed: Yes    No anethesia notable event occurred.    Staff: Anesthesiologist               BP      Temp      Pulse     Resp      SpO2      /79   Pulse 72   Temp 98 °F (36.7 °C) (Oral)   Resp 16   Ht 5' 10\" (1.778 m)   Wt 136 kg (299 lb)   LMP 05/01/2023 (Approximate)   SpO2 97%   Breastfeeding Yes   BMI 42.90 kg/m²     "

## 2024-02-16 NOTE — PLAN OF CARE
Problem: BIRTH - VAGINAL/ SECTION  Goal: Fetal and maternal status remain reassuring during the birth process  Description: INTERVENTIONS:  - Monitor vital signs  - Monitor fetal heart rate  - Monitor uterine activity  - Monitor labor progression (vaginal delivery)  - DVT prophylaxis  - Antibiotic prophylaxis  Outcome: Completed  Goal: Emotionally satisfying birthing experience for mother/fetus  Description: Interventions:  - Assess, plan, implement and evaluate the nursing care given to the patient in labor  - Advocate the philosophy that each childbirth experience is a unique experience and support the family's chosen level of involvement and control during the labor process   - Actively participate in both the patient's and family's teaching of the birth process  - Consider cultural, [place] and age-specific factors and plan care for the patient in labor  Outcome: Completed     Problem: POSTPARTUM  Goal: Experiences normal postpartum course  Description: INTERVENTIONS:  - Monitor maternal vital signs  - Assess uterine involution and lochia  Outcome: Progressing  Goal: Appropriate maternal -  bonding  Description: INTERVENTIONS:  - Identify family support  - Assess for appropriate maternal/infant bonding   -Encourage maternal/infant bonding opportunities  - Referral to  or  as needed  Outcome: Progressing  Goal: Establishment of infant feeding pattern  Description: INTERVENTIONS:  - Assess breast/bottle feeding  - Refer to lactation as needed  Outcome: Progressing  Goal: Incision(s), wounds(s) or drain site(s) healing without S/S of infection  Description: INTERVENTIONS  - Assess and document dressing, incision, wound bed, drain sites and surrounding tissue  - Provide patient and family education  Outcome: Progressing     Problem: PAIN - ADULT  Goal: Verbalizes/displays adequate comfort level or baseline comfort level  Description: Interventions:  - Encourage patient to  monitor pain and request assistance  - Assess pain using appropriate pain scale  - Administer analgesics based on type and severity of pain and evaluate response  - Implement non-pharmacological measures as appropriate and evaluate response  - Consider cultural and social influences on pain and pain management  - Notify physician/advanced practitioner if interventions unsuccessful or patient reports new pain  Outcome: Completed     Problem: INFECTION - ADULT  Goal: Absence or prevention of progression during hospitalization  Description: INTERVENTIONS:  - Assess and monitor for signs and symptoms of infection  - Monitor lab/diagnostic results  - Monitor all insertion sites, i.e. indwelling lines, tubes, and drains  - Monitor endotracheal if appropriate and nasal secretions for changes in amount and color  - [place] appropriate cooling/warming therapies per order  - Administer medications as ordered  - Instruct and encourage patient and family to use good hand hygiene technique  - Identify and instruct in appropriate isolation precautions for identified infection/condition  Outcome: Completed  Goal: Absence of fever/infection during neutropenic period  Description: INTERVENTIONS:  - Monitor WBC    Outcome: Completed     Problem: SAFETY ADULT  Goal: Patient will remain free of falls  Description: INTERVENTIONS:  - Educate patient/family on patient safety including physical limitations  - Instruct patient to call for assistance with activity   - Consult OT/PT to assist with strengthening/mobility   - Keep Call bell within reach  - Keep bed low and locked with side rails adjusted as appropriate  - Keep care items and personal belongings within reach  - Initiate and maintain comfort rounds  - Make Fall Risk Sign visible to staff  - Apply yellow socks and bracelet for high fall risk patients  - Consider moving patient to room near nurses station  Outcome: Completed  Goal: Maintain or return to baseline ADL  function  Description: INTERVENTIONS:  -  Assess patient's ability to carry out ADLs; assess patient's baseline for ADL function and identify physical deficits which impact ability to perform ADLs (bathing, care of mouth/teeth, toileting, grooming, dressing, etc.)  - Assess/evaluate cause of self-care deficits   - Assess range of motion  - Assess patient's mobility; develop plan if impaired  - Assess patient's need for assistive devices and provide as appropriate  - Encourage maximum independence but intervene and supervise when necessary  - Involve family in performance of ADLs  - Assess for home care needs following discharge   - Consider OT consult to assist with ADL evaluation and planning for discharge  - Provide patient education as appropriate  Outcome: Completed  Goal: Maintains/Returns to pre admission functional level  Description: INTERVENTIONS:  - Perform AM-PAC 6 Click Basic Mobility/ Daily Activity assessment daily.  - Set and communicate daily mobility goal to care team and patient/family/caregiver.   - Collaborate with rehabilitation services on mobility goals if consulted    - Out of bed for toileting  - Record patient progress and toleration of activity level   Outcome: Completed     Problem: DISCHARGE PLANNING  Goal: Discharge to home or other facility with appropriate resources  Description: INTERVENTIONS:  - Identify barriers to discharge w/patient and caregiver  - Arrange for needed discharge resources and transportation as appropriate  - Identify discharge learning needs (meds, wound care, etc.)  - Arrange for interpretive services to assist at discharge as needed  - Refer to Case Management Department for coordinating discharge planning if the patient needs post-hospital services based on physician/advanced practitioner order or complex needs related to functional status, cognitive ability, or social support system  Outcome: Completed     Problem: Knowledge Deficit  Goal:  Patient/family/caregiver demonstrates understanding of disease process, treatment plan, medications, and discharge instructions  Description: Complete learning assessment and assess knowledge base.  Interventions:  - Provide teaching at level of understanding  - Provide teaching via preferred learning methods  Outcome: Completed

## 2024-02-16 NOTE — PLAN OF CARE
Problem: POSTPARTUM  Goal: Experiences normal postpartum course  Description: INTERVENTIONS:  - Monitor maternal vital signs  - Assess uterine involution and lochia  Outcome: Progressing  Goal: Appropriate maternal -  bonding  Description: INTERVENTIONS:  - Identify family support  - Assess for appropriate maternal/infant bonding   -Encourage maternal/infant bonding opportunities  - Referral to  or  as needed  Outcome: Progressing  Goal: Establishment of infant feeding pattern  Description: INTERVENTIONS:  - Assess breast/bottle feeding  - Refer to lactation as needed  Outcome: Progressing  Goal: Incision(s), wounds(s) or drain site(s) healing without S/S of infection  Description: INTERVENTIONS  - Assess and document dressing, incision, wound bed, drain sites and surrounding tissue  - Provide patient and family education  Outcome: Progressing

## 2024-02-16 NOTE — DISCHARGE SUMMARY
Obstetrics Discharge Summary   Sarah Napoles 33 y.o. female MRN: [id number]  Unit/Bed#: LD OR  Encounter: [id number]    Admission Date: 2/15/2024     Discharge Date: 2024    Admitting Diagnoses:   Pregnancy at 35w5d gestational age   History of prior  section delivery   Premature   rupture of membranes  Positive GBS status   Cholestasis during pregnancy     Discharge Diagnoses:   Same, delivered      Procedures:   repeat  section, low transverse incision      Admitting Attending: Dr. Juarez  Delivery Attending: Dr. Juarez  Discharge Attending: Dr. Brown    [facility] Course:   Sarah Napoles is now a 33 y.o.  who was initially admitted at 35w5d for a repeat  section delivery due to  premature  rupture of membranes.     She underwent an uncomplicated repeat low transverse  section delivery on 02/15/2024 and delivered a viable male  at . APGARS were 7, 8 at 1 and 5 minutes, respectively. 's birth weight was 7lb 5.5oz. Placenta delivered without difficulty.   was admitted to the  nursery. Patient tolerated the procedure well and was transferred to recovery in stable condition.     The patient's post partum course was unremarkable.. Her preoperative hemoglobin was 11.3 g/dL, postoperative was 10. Her postoperative pain was well controlled with oral analgesics.    On day of discharge, she was ambulating and able to reasonably perform all ADLs. She was voiding and had appropriate bowel function. Pain was well controlled. She was discharged home on post-operative day #3 without complications. Patient was instructed to follow up with her OB as an outpatient and was given appropriate warnings to call provider if she develops signs of infection or uncontrolled pain. She is breast and bottlefeeding .  Mom's blood type is O positive RhoGAM was not indicated.      Complications:   None    Condition at discharge:    good     Provisions for Follow-Up Care:  See after visit summary for information related to follow-up care and any pertinent home health orders.      Disposition:   Home    Planned Readmission:   No    Discharge Medications:   Please see AVS for a complete list of discharge medications.    Discharge instructions :   Please see AVS for complete discharge instructions.

## 2024-02-16 NOTE — LACTATION NOTE
This note was copied from a baby's chart.  CONSULT - LACTATION  Baby Boy (Sarah) Rizwana Napoles 1 days male MRN: [id number]    [facility] NURSERY Room / Bed: (N)/(N) Encounter: [id number]    Maternal Information     MOTHER:  Sarah Griffiths  Maternal Age: 33 y.o.   OB History: # 1 - Date: 19, Sex: None, Weight: None, GA: 15w3d, Delivery: None, Apgar1: None, Apgar5: None, Living: Fetal Demise, Birth Comments: at 14 w6d and then due to poor prognosis requested at 15w2d D&E    # 2 - Date: , Sex: None, Weight: None, GA: None, Delivery: Spontaneous , Apgar1: None, Apgar5: None, Living: None, Birth Comments: early first trimester SAB    # 3 - Date: 21, Sex: Female, Weight: 4000 g (8 lb 13.1 oz), GA: 40w1d, Delivery: , Low Transverse, Apgar1: 7, Apgar5: 9, Living: Living, Birth Comments: None    # 4 - Date: 02/15/24, Sex: Male, Weight: 3330 g (7 lb 5.5 oz), GA: 35w5d, Delivery: , Low Transverse, Apgar1: 7, Apgar5: 8, Living: Living, Birth Comments: None   Previouse breast reduction surgery? No    Lactation history:   Has patient previously breast fed: Yes   How long had patient previously breast fed: about 9 months   Previous breast feeding complications: Exclusive pump and bottle fed, Other (Comment) (difficulty latching)     Past Surgical History:   Procedure Laterality Date    DILATION AND EVACUATION  2019    [place]  DELIVERY ONLY N/A 2021    Procedure:  SECTION ();  Surgeon: Vivian Rosales MD;  Location: [facility];  Service: Obstetrics    [place] INDUCED  DILATION & EVACUATION N/A 2019    Procedure: DILATATION AND EVACUATION (D&E) 15 Weeks;  Surgeon: Aileen Gorman MD;  Location:  MAIN OR;  Service: Gynecology    TONSILLECTOMY          Birth information:  YOB: 2023   Time of birth: 8:36 PM   Sex: male   Delivery type: , Low Transverse   Birth Weight: 3330 g  (7 lb 5.5 oz)   Percent of Weight Change: 0%     Gestational Age: 35w5d   [unfilled]    Assessment     Breast and nipple assessment: normal assessment    [place] Assessment: frequent gagging    Feeding assessment: latch difficulty (too gaggy at this time; report that he nursed well after delivery)  LATCH:  Latch: Repeated attempts, hold nipple in mouth, stimulate to suck (a few short latches)   Audible Swallowing: A few with stimulation (mostly with hand expression)   Type of Nipple: Everted (After stimulation)   Comfort (Breast/Nipple): Filling, red/small blisters/bruises, mild/moderate discomfort   Hold (Positioning): Partial assist, teach one side, mother does other, staff holds   LATCH Score: 6          Feeding recommendations:  breast feed on demand      Met with mother. Provided with Ready, Set, Baby booklet which contained information on:  Hand expression with access to QR codes to review hand expression. Encouraged to express 0.3ml prior to feeds while on blood sugar protocol.   Positioning and latch reviewed as well as showing images of other feeding positions.  Discussed the properties of a good latch in any position.   Feeding on cue and what that means for recognizing infant's hunger, s/s that baby is getting enough milk and some s/s that breastfeeding dyad may need further help Mom asked for assistance to feed. Latch attempt after hand expression. A few short latches with piston sucks at this time.   Skin to Skin contact and benefits to mom and baby  Avoidance of pacifiers for the first month discussed.   Gave information on common concerns, what to expect the first few weeks after delivery, preparing for other caregivers, and how partners can help. Resources for support also provided.    Also reviewed  discharge breastfeeding booklet including the feeding log. Emphasized 8 or more (12) feedings in a 24 hour period, what to expect for the number of diapers per day of life and the progression of  properties of the  stooling pattern.    List of reasons to call a lactation consultant.  Feeding logs  Feeding cues  Hand expression  Baby's Second day (cluster feeding)  Breastfeeding and Your Lifestyle (Medications, Alcohol, Caffeine, Smoking, Street Drugs, Methadone)  First Two Weeks Survival Guide for Breastfeeding  Breast Changes  Physical Therapy  Storage and Handling of Breast milk  How to Keep Your Breast Pump Kit Clean  The Employed Breastfeeding Mother  Mixed feeding  Bottle feeding like breastfeeding (paced bottle feeding)  astfeeding and your lifestyle, storage and preparation of breast milk, how to keep you breast pump clean, the employed breastfeeding mother and paced bottle feeding handouts.     Booklet included Breastfeeding Resources for after discharge including access to the number for the [facility].    [employer] [facility]  to call for assistance, questions and concerns.  Extension number for inpatient lactation support provided.        Sarah Flores RN 2024 1:23 PM

## 2024-02-16 NOTE — ANESTHESIA PROCEDURE NOTES
Spinal Block    Patient location during procedure: OB  Start time: 2/15/2024 8:08 PM  Reason for block: primary anesthetic  Staffing  Performed by: Oral Felipe DO  Authorized by: Oral Felipe DO    Preanesthetic Checklist  Completed: patient identified, IV checked, site marked, risks and benefits discussed, surgical consent, monitors and equipment checked, pre-op evaluation and timeout performed  Spinal Block  Patient position: sitting  Prep: Betadine  Patient monitoring: heart rate, cardiac monitor, continuous pulse ox and frequent blood pressure checks  Approach: midline  Location: L2-3  Needle  Needle type: pencil-tip   Needle gauge: 24 G  Assessment  Sensory level: T4  Injection Assessment:  negative aspiration for heme, no paresthesia on injection and positive aspiration for clear CSF.

## 2024-02-16 NOTE — PROGRESS NOTES
"Progress Note - OB/GYN  Sarah Napoles 33 y.o. female MRN: [id number]  Unit/Bed#:  412-01 Encounter: [id number]    Assessment and Plan     Sarah Napoles is a patient of: [facility]. She is PPD# 1 s/p  repeat  section, low transverse incision  Recovering well and is stable       Cholestasis during pregnancy in third trimester  Assessment & Plan  On Urosdiol    * Status post repeat low transverse  section  Assessment & Plan  , Hgb 11.3 --> post op Hgb 10.0  Lines: mahan in place   Pain: Tylenol and toradol scheduled, analia 5/10 PRN    FEN: Tolerating regular diet  DVT ppx: SCDs and Lovenox 40mg BID  Passing flatus   Incision C/D/I           Disposition    - Anticipate discharge home on PPD# 2-3      Subjective/Objective     Chief Complaint: Postpartum State     Subjective:    Sarah Napoles is POD#1 s/p  repeat  section, low transverse incision. She has no current complaints.  Pain is well controlled.  Mahan in place. Patient is not currently voiding.  She is not ambulating.  Patient is currently passing flatus and has had no bowel movement. She is tolerating PO, and denies nausea or vomitting. Patient denies fever, chills, chest pain, shortness of breath, or calf tenderness. Lochia is normal. She is  Breastfeeding. She is recovering well and is stable.       Vitals:   /76 (BP Location: Right arm)   Pulse 77   Temp 99.2 °F (37.3 °C) (Oral)   Resp 16   Ht 5' 10\" (1.778 m)   Wt 136 kg (299 lb)   LMP 2023 (Approximate)   SpO2 97%   Breastfeeding Unknown   BMI 42.90 kg/m²       Intake/Output Summary (Last 24 hours) at 2024 0732  Last data filed at 2024 0630  Gross per 24 hour   Intake 3100 ml   Output 648 ml   Net 2452 ml       Invasive Devices       Peripheral Intravenous Line  Duration             Peripheral IV 02/15/24 Left;Ventral (anterior) Forearm <1 day              Drain  Duration             Urethral Catheter " Straight-tip 16 Fr. <1 day              Intrauterine Pressure Catheter  Duration             Intrauterine Pressure Catheter 12/28/21 1816 779 days                    Physical Exam:   GEN: Sarah Napoles appears well, alert and oriented x 3, pleasant and cooperative   CARDIO: RRR, no murmurs or rubs  RESP:  CTAB, no wheezes or rales  ABDOMEN: soft, no tenderness, no distention, fundus @ umbilicus, Incision C/D/I  EXTREMITIES: SCDs on, non tender, no erythema      Labs:     Hemoglobin   Date Value Ref Range Status   02/16/2024 10.0 (L) 11.5 - 15.4 g/dL Final   02/15/2024 11.3 (L) 11.5 - 15.4 g/dL Final     WBC   Date Value Ref Range Status   02/16/2024 11.09 (H) 4.31 - 10.16 Thousand/uL Final   02/15/2024 12.73 (H) 4.31 - 10.16 Thousand/uL Final     Platelets   Date Value Ref Range Status   02/16/2024 211 149 - 390 Thousands/uL Final   02/15/2024 234 149 - 390 Thousands/uL Final     Creatinine   Date Value Ref Range Status   02/15/2024 0.53 (L) 0.60 - 1.30 mg/dL Final     Comment:     Standardized to IDMS reference method   02/09/2024 0.58 (L) 0.60 - 1.30 mg/dL Final     Comment:     Standardized to IDMS reference method     AST   Date Value Ref Range Status   02/15/2024 15 13 - 39 U/L Final   02/09/2024 16 13 - 39 U/L Final   02/01/2024 25 0 - 40 IU/L Final     ALT   Date Value Ref Range Status   02/15/2024 15 7 - 52 U/L Final     Comment:     Specimen collection should occur prior to Sulfasalazine administration due to the potential for falsely depressed results.    02/09/2024 32 7 - 52 U/L Final     Comment:     Specimen collection should occur prior to Sulfasalazine administration due to the potential for falsely depressed results.    02/01/2024 17 0 - 32 IU/L Final          Marline Humphrey MD  2/16/2024  7:32 AM

## 2024-02-16 NOTE — OP NOTE
OPERATIVE REPORT  PATIENT NAME: Sarah Napoles    :  1990  MRN: [id number]  Pt Location: [facility] L&D OR ROOM 01    SURGERY DATE: 2/15/2024    Surgeons and Role:     * Neeta Juarez MD - Primary     * Giovanna Campuzano MD - Assisting    Preop Diagnosis:  Pregnancy at 35w5d gestational age   History of prior  section delivery   Premature   rupture of membranes  Positive GBS status   Cholestasis during pregnancy       Procedure(s) (LRB):   SECTION () REPEAT (N/A)    Specimen(s):  ID Type Source Tests Collected by Time Destination   A :  Tissue (Placenta on Hold) OB Only Placenta PLACENTA IN STORAGE Neeta Juarez MD 2/15/2024 1617    B :  Cord Blood Cord BLOOD GAS, VENOUS, CORD Neeta Juarez MD 2/15/2024 2030    C :  Cord Blood Cord BLOOD GAS, ARTERIAL, CORD Neeta Juarez MD 2/15/2024 2030        Surgical QBL:  Surgical QBL (mL): 348 mL      Drains:  Urethral Catheter Straight-tip 16 Fr. (Active)   Number of days: 0       Anesthesia Type:   Spinal     Operative Indications:  PPROM  History of prior  section delivery          Hospital Course:   Sarah Napoles is now a 33 y.o.  who was initially admitted at 35w5d for a repeat  section delivery due to  premature  rupture of membranes.        [employer] Classification System:  No Multiple pregnancy, No Transverse or oblique lie, No Breech lie, Gestational age is < 37 weeks +  is PETER GROUP 10    Operative Findings:  1. Delivery of viable male on 02/15/2024 at 2036, weight 7lbs 5.5oz;  Apgar scores of 7 at one minute and 8 at five minutes. Umbilical artery pH 7.2 (base excess -2.6)  2. Normal appearing placenta with centrally-inserted 3 vessel cord  3. clear amniotic fluid  4. Grossly normal uterus, tubes, and ovaries        Procedure and Technique:  In the operating room the correct patient and procedures were identified. Spinal  anesthesia was adequately established. 2g  Ancef as well as 500mg of Azithromycin IV was given for preoperative surgical prophylaxis. Patient was placed in the dorsal supine position with a left tilt of the hips. Fetal heart tones were confirmed to be 130bpm. Chlorhexidine was used to prep the vagina and perineum. A mahan catheter was inserted. Chloraprep was used to prep the abdomen. She was draped in the usual sterile fashion. Time out was performed with all in agreement. Time out was performed.     Pfannenstiel incision was made in the skin with a surgical scalpel and sharp dissection was carried out over subsequent layers of tissue down to the level of the fascia. The fascia was incised at the midline and the incision was extended bilaterally using the curved Landa scissors. The superior edge of the fascial incision was grasped with Kocher clamps and dissected off the underlying rectus muscles . The inferior edge of the fascial incision was grasped with Kocher clamps and the underlying rectus muscles was bluntly dissected off. The rectus muscles were then divided at midline and the peritoneum was identified and entered. The peritoneal incision was extended superiorly and inferiorly. The Bladder blade was inserted and the vesicouterine peritoneum was identified; this was then grasped with forceps and cut laterally to both sides using the Metzenbaum scissors.  A bladder blade was reinserted and a transverse incision was made in the lower uterine segment using a new surgical blade. Allis clamps was used to rupture amniotic sac and clear amniotic fluid was noted. The uterine incision was extended cephalad and caudal using blunt dissection. The surgeon's hand was placed into the uterine cavity. The fetal head was identified and elevated through the uterine incision with the assistance of fundal pressure. There was no nuchal cord noted. With gentle traction, the shoulders were delivered followed by the rest of the fetal body. Following delayed cord clamping,  the umbilical cord was doubly clamped and cut. The infant was then passed off the table to the awaiting  staff. The  was noted to cry spontaneously and moved all extremities. Venous and arterial blood gas, cord blood, and portion of cord was obtained for analysis and routine blood testing.  The placenta delivered with uterine massage and was noted to be intact with and had a central insertion of a three-vessel cord. The placenta was sent to storage. Oxytocin was administered by IV infusion to enhance uterine contraction. The uterus was exteriorized and cleared of all clots and remaining products of conception.      The hysterotomy was reapproximated using 0- Vicryl in a running locked fashion.  A second imbricating stitch with 0- Vicryl was applied. Hemostasis was achieved. The posterior cul-de-sac was cleared of all clots and products of conception. The uterus was replaced into the abdomen and the pericolic gutters were cleared of all clots. Hemostasis was once again confirmed at the hysterotomy. The peritoneum or rectus was re-approximated with 2-0 plain chromic. The fascia was reapproximated using 0- Vicryl in a running nonlocked fashion. The subcutaneous tissue was irrigated and cleared of all clots and debris. Good hemostasis was ensured with  Bovie electrocautery. The subcutaneous tissue was reapproximated with 2-0 plain chromic.  The skin incision was closed in a running subcuticular fashion with 4-0 Stratfix.  Good hemostasis was noted. Exoffin was applied. The uterus was expressed of clots. Patient tolerated the procedure well.  All needle, sponge, and instrument counts were noted to be correct x 2 at the end of the procedure.  Patient was transferred to the recovery room in stable condition.      Dr. Juarez was present for the procedure.      Complications:   None      Patient Disposition:  Postpartum recovery room         SIGNATURE: Giovanna Campuzano MD  DATE: 2024  TIME:  12:41 AM

## 2024-02-16 NOTE — ASSESSMENT & PLAN NOTE
, Hgb 11.3 --> post op Hgb 10.0  Voiding spontaneously  Pain: Tylenol and motrin scheduled, analia 5/10 PRN    FEN: Tolerating regular diet  DVT ppx: SCDs and Lovenox 40mg BID  Passing flatus   Incision C/D/I

## 2024-02-17 PROCEDURE — 99024 POSTOP FOLLOW-UP VISIT: CPT | Performed by: STUDENT IN AN ORGANIZED HEALTH CARE EDUCATION/TRAINING PROGRAM

## 2024-02-17 RX ORDER — CALCIUM CARBONATE 500 MG/1
1000 TABLET, CHEWABLE ORAL DAILY PRN
Status: CANCELLED
Start: 2024-02-17

## 2024-02-17 RX ORDER — IBUPROFEN 600 MG/1
600 TABLET ORAL EVERY 6 HOURS
Qty: 30 TABLET | Refills: 0 | Status: CANCELLED | OUTPATIENT
Start: 2024-02-17 | End: 2024-03-18

## 2024-02-17 RX ORDER — DOCUSATE SODIUM 100 MG/1
100 CAPSULE, LIQUID FILLED ORAL 2 TIMES DAILY
Status: CANCELLED
Start: 2024-02-17

## 2024-02-17 RX ORDER — ACETAMINOPHEN 325 MG/1
650 TABLET ORAL EVERY 6 HOURS SCHEDULED
Status: CANCELLED
Start: 2024-02-17 | End: 2024-02-19

## 2024-02-17 RX ADMIN — ENOXAPARIN SODIUM 40 MG: 40 INJECTION SUBCUTANEOUS at 21:33

## 2024-02-17 RX ADMIN — ACETAMINOPHEN 325MG 650 MG: 325 TABLET ORAL at 13:40

## 2024-02-17 RX ADMIN — LORATADINE 10 MG: 10 TABLET ORAL at 08:11

## 2024-02-17 RX ADMIN — ENOXAPARIN SODIUM 40 MG: 40 INJECTION SUBCUTANEOUS at 08:11

## 2024-02-17 RX ADMIN — ACETAMINOPHEN 325MG 650 MG: 325 TABLET ORAL at 01:08

## 2024-02-17 RX ADMIN — KETOROLAC TROMETHAMINE 30 MG: 30 INJECTION, SOLUTION INTRAMUSCULAR; INTRAVENOUS at 08:12

## 2024-02-17 RX ADMIN — DOXYLAMINE SUCCINATE 12.5 MG: 25 TABLET ORAL at 21:33

## 2024-02-17 RX ADMIN — KETOROLAC TROMETHAMINE 30 MG: 30 INJECTION, SOLUTION INTRAMUSCULAR; INTRAVENOUS at 02:50

## 2024-02-17 RX ADMIN — DOCUSATE SODIUM 100 MG: 100 CAPSULE, LIQUID FILLED ORAL at 08:11

## 2024-02-17 RX ADMIN — SERTRALINE HYDROCHLORIDE 50 MG: 50 TABLET ORAL at 08:11

## 2024-02-17 RX ADMIN — ACETAMINOPHEN 325MG 650 MG: 325 TABLET ORAL at 08:11

## 2024-02-17 RX ADMIN — DOCUSATE SODIUM 100 MG: 100 CAPSULE, LIQUID FILLED ORAL at 19:12

## 2024-02-17 RX ADMIN — IBUPROFEN 600 MG: 600 TABLET, FILM COATED ORAL at 15:35

## 2024-02-17 RX ADMIN — ACETAMINOPHEN 325MG 650 MG: 325 TABLET ORAL at 19:12

## 2024-02-17 RX ADMIN — IBUPROFEN 600 MG: 600 TABLET, FILM COATED ORAL at 21:33

## 2024-02-17 NOTE — PLAN OF CARE
Problem: POSTPARTUM  Goal: Experiences normal postpartum course  Description: INTERVENTIONS:  - Monitor maternal vital signs  - Assess uterine involution and lochia  Outcome: Progressing  Goal: Appropriate maternal -  bonding  Description: INTERVENTIONS:  - Identify family support  - Assess for appropriate maternal/infant bonding   -Encourage maternal/infant bonding opportunities  - Referral to  or  as needed  Outcome: Progressing  Goal: Establishment of infant feeding pattern  Description: INTERVENTIONS:  - Assess breast/bottle feeding  - Refer to lactation as needed  Outcome: Progressing  Goal: Incision(s), wounds(s) or drain site(s) healing without S/S of infection  Description: INTERVENTIONS  - Assess and document dressing, incision, wound bed, drain sites and surrounding tissue  - Provide patient and family education  - Perform skin care/dressing changes every   Outcome: Progressing

## 2024-02-17 NOTE — PROGRESS NOTES
"Progress Note - OB/GYN  Sarah Napoles 33 y.o. female MRN: [id number]  Unit/Bed#:  412-01 Encounter: [id number]    Assessment and Plan     Sarah Napoles is a patient of: [facility]. She is POD# 2 s/p RLTCS.  Recovering well and is stable       Cholestasis during pregnancy in third trimester  Assessment & Plan  On Urosdiol    * Status post repeat low transverse  section  Assessment & Plan  , Hgb 11.3 --> post op Hgb 10.0  Voiding spontaneously  Pain: Tylenol and motrin scheduled, analia 5/10 PRN    FEN: Tolerating regular diet  DVT ppx: SCDs and Lovenox 40mg BID  Passing flatus   Incision C/D/I           Disposition    - Anticipate discharge home today vs tomorrow.      Subjective/Objective     Chief Complaint: Postoperative State     Subjective:    Sarah Napoles is s/p RLTCS. She is POD# 2. She has no current complaints.  Pain is well controlled.  Patient is currently voiding.  She is ambulating.  Patient is currently passing flatus and has had no bowel movement. She is tolerating PO, and denies nausea or vomitting. Patient denies fever, chills, chest pain, shortness of breath, or calf tenderness. Lochia is normal. She is  Breastfeeding. Her incision is C/D/I. She is recovering well and is stable.       Vitals:   /90   Pulse 80   Temp 97.8 °F (36.6 °C) (Temporal)   Resp 18   Ht 5' 10\" (1.778 m)   Wt 136 kg (299 lb)   LMP 2023 (Approximate)   SpO2 97%   Breastfeeding Yes   BMI 42.90 kg/m²       Intake/Output Summary (Last 24 hours) at 2024 0647  Last data filed at 2024 2243  Gross per 24 hour   Intake --   Output 2550 ml   Net -2550 ml       Invasive Devices       Peripheral Intravenous Line  Duration             Peripheral IV 02/15/24 Left;Ventral (anterior) Forearm 1 day              Intrauterine Pressure Catheter  Duration             Intrauterine Pressure Catheter 21 1816 780 days                    Physical Exam: "   GEN: Sarah Napoles appears well, alert, pleasant and cooperative   CARDIO: RRR  RESP:  Normal respiratory effort  ABDOMEN: soft, no tenderness, no distention, fundus firm, Incision C/D/I  EXTREMITIES: SCDs on, Negative Lino's sign bilaterally      Labs:     Hemoglobin   Date Value Ref Range Status   02/16/2024 10.0 (L) 11.5 - 15.4 g/dL Final   02/15/2024 11.3 (L) 11.5 - 15.4 g/dL Final     WBC   Date Value Ref Range Status   02/16/2024 11.09 (H) 4.31 - 10.16 Thousand/uL Final   02/15/2024 12.73 (H) 4.31 - 10.16 Thousand/uL Final     Platelets   Date Value Ref Range Status   02/16/2024 211 149 - 390 Thousands/uL Final   02/15/2024 234 149 - 390 Thousands/uL Final     Creatinine   Date Value Ref Range Status   02/15/2024 0.53 (L) 0.60 - 1.30 mg/dL Final     Comment:     Standardized to IDMS reference method   02/09/2024 0.58 (L) 0.60 - 1.30 mg/dL Final     Comment:     Standardized to IDMS reference method     AST   Date Value Ref Range Status   02/15/2024 15 13 - 39 U/L Final   02/09/2024 16 13 - 39 U/L Final   02/01/2024 25 0 - 40 IU/L Final     ALT   Date Value Ref Range Status   02/15/2024 15 7 - 52 U/L Final     Comment:     Specimen collection should occur prior to Sulfasalazine administration due to the potential for falsely depressed results.    02/09/2024 32 7 - 52 U/L Final     Comment:     Specimen collection should occur prior to Sulfasalazine administration due to the potential for falsely depressed results.    02/01/2024 17 0 - 32 IU/L Final          Itz Dillon MD  OBGYN PGY2  2/17/2024  6:47 AM

## 2024-02-18 VITALS
HEART RATE: 90 BPM | OXYGEN SATURATION: 99 % | DIASTOLIC BLOOD PRESSURE: 79 MMHG | TEMPERATURE: 97.5 F | RESPIRATION RATE: 18 BRPM | BODY MASS INDEX: 41.95 KG/M2 | SYSTOLIC BLOOD PRESSURE: 135 MMHG | HEIGHT: 70 IN | WEIGHT: 293 LBS

## 2024-02-18 PROCEDURE — NC001 PR NO CHARGE: Performed by: OBSTETRICS & GYNECOLOGY

## 2024-02-18 PROCEDURE — 99024 POSTOP FOLLOW-UP VISIT: CPT | Performed by: OBSTETRICS & GYNECOLOGY

## 2024-02-18 RX ORDER — IBUPROFEN 600 MG/1
600 TABLET ORAL EVERY 6 HOURS
Qty: 30 TABLET | Refills: 0 | Status: SHIPPED | OUTPATIENT
Start: 2024-02-18 | End: 2024-03-19

## 2024-02-18 RX ORDER — DOCUSATE SODIUM 100 MG/1
100 CAPSULE, LIQUID FILLED ORAL 2 TIMES DAILY
Start: 2024-02-18

## 2024-02-18 RX ORDER — ACETAMINOPHEN 325 MG/1
650 TABLET ORAL EVERY 6 HOURS SCHEDULED
Start: 2024-02-18 | End: 2024-02-19

## 2024-02-18 RX ORDER — CALCIUM CARBONATE 500 MG/1
1000 TABLET, CHEWABLE ORAL DAILY PRN
Start: 2024-02-18

## 2024-02-18 RX ADMIN — LORATADINE 10 MG: 10 TABLET ORAL at 07:53

## 2024-02-18 RX ADMIN — SERTRALINE HYDROCHLORIDE 50 MG: 50 TABLET ORAL at 07:53

## 2024-02-18 RX ADMIN — ACETAMINOPHEN 325MG 650 MG: 325 TABLET ORAL at 07:53

## 2024-02-18 RX ADMIN — DOCUSATE SODIUM 100 MG: 100 CAPSULE, LIQUID FILLED ORAL at 07:53

## 2024-02-18 RX ADMIN — ENOXAPARIN SODIUM 40 MG: 40 INJECTION SUBCUTANEOUS at 07:53

## 2024-02-18 RX ADMIN — IBUPROFEN 600 MG: 600 TABLET, FILM COATED ORAL at 10:22

## 2024-02-18 RX ADMIN — IBUPROFEN 600 MG: 600 TABLET, FILM COATED ORAL at 04:36

## 2024-02-18 RX ADMIN — ACETAMINOPHEN 325MG 650 MG: 325 TABLET ORAL at 13:21

## 2024-02-18 NOTE — PROGRESS NOTES
"Progress Note - OB/GYN  Sarah Napoles 33 y.o. female MRN: [id number]  Unit/Bed#:  412-01 Encounter: [id number]    Assessment and Plan     Sarah Napoles is a patient of: [facility]. She is POD# 3 s/p RLTCS.  Recovering well and is stable       Cholestasis during pregnancy in third trimester  Assessment & Plan  On Urosdiol    * Status post repeat low transverse  section  Assessment & Plan  , Hgb 11.3 --> post op Hgb 10.0  Voiding spontaneously  Pain: Tylenol and motrin scheduled, analia 5/10 PRN    FEN: Tolerating regular diet  DVT ppx: SCDs and Lovenox 40mg BID  Passing flatus   Incision C/D/I           Disposition    - Anticipate discharge home today vs tomorrow      Subjective/Objective     Chief Complaint: Postoperative State     Subjective:    Sarah Napoles is s/p RLTCS. She is POD# 3. She has no current complaints.  Pain is well controlled.  Patient is currently voiding.  She is ambulating.  Patient is currently passing flatus and has had no bowel movement. She is tolerating PO, and denies nausea or vomitting. Patient denies fever, chills, chest pain, shortness of breath, or calf tenderness. Lochia is minimal. She is  Breastfeeding. Her incision is C/D/I. She is recovering well and is stable.       Vitals:   /71 (BP Location: Left arm)   Pulse 87   Temp 97.5 °F (36.4 °C) (Oral)   Resp 16   Ht 5' 10\" (1.778 m)   Wt 136 kg (299 lb)   LMP 2023 (Approximate)   SpO2 99%   Breastfeeding Yes   BMI 42.90 kg/m²     No intake or output data in the 24 hours ending 24 0628    Invasive Devices       Intrauterine Pressure Catheter  Duration             Intrauterine Pressure Catheter 21 1816 781 days                    Physical Exam:   GEN: Sarah Napoles appears well, alert, pleasant and cooperative   CARDIO: RRR  RESP:  Normal respiratory effort  ABDOMEN: soft, no tenderness, no distention, fundus firm, Incision " C/D/I  EXTREMITIES: SCDs on, Negative Lino's sign bilaterally      Labs:     Hemoglobin   Date Value Ref Range Status   02/16/2024 10.0 (L) 11.5 - 15.4 g/dL Final   02/15/2024 11.3 (L) 11.5 - 15.4 g/dL Final     WBC   Date Value Ref Range Status   02/16/2024 11.09 (H) 4.31 - 10.16 Thousand/uL Final   02/15/2024 12.73 (H) 4.31 - 10.16 Thousand/uL Final     Platelets   Date Value Ref Range Status   02/16/2024 211 149 - 390 Thousands/uL Final   02/15/2024 234 149 - 390 Thousands/uL Final     Creatinine   Date Value Ref Range Status   02/15/2024 0.53 (L) 0.60 - 1.30 mg/dL Final     Comment:     Standardized to IDMS reference method   02/09/2024 0.58 (L) 0.60 - 1.30 mg/dL Final     Comment:     Standardized to IDMS reference method     AST   Date Value Ref Range Status   02/15/2024 15 13 - 39 U/L Final   02/09/2024 16 13 - 39 U/L Final   02/01/2024 25 0 - 40 IU/L Final     ALT   Date Value Ref Range Status   02/15/2024 15 7 - 52 U/L Final     Comment:     Specimen collection should occur prior to Sulfasalazine administration due to the potential for falsely depressed results.    02/09/2024 32 7 - 52 U/L Final     Comment:     Specimen collection should occur prior to Sulfasalazine administration due to the potential for falsely depressed results.    02/01/2024 17 0 - 32 IU/L Final          Itz Dillon MD  OBGYN PGY2  2/18/2024  6:28 AM

## 2024-02-18 NOTE — NURSING NOTE
Maternal/ discharge teaching complete. Pt verbalized understanding and denies any questions at this time. Pt encouraged to call for nursing staff if any questions come to mind prior to discharge. Save your life magnet and education packet given and reviewed.

## 2024-02-18 NOTE — LACTATION NOTE
This note was copied from a baby's chart.  CONSULT - LACTATION  Baby Boy (Sarah) Rizwana Napoles 3 days male MRN: [id number]    [facility] NURSERY Room / Bed: (N)/(N) Encounter: [id number]    Maternal Information     MOTHER:  Sarah Griffiths  Maternal Age: 33 y.o.   OB History: # 1 - Date: 19, Sex: None, Weight: None, GA: 15w3d, Delivery: None, Apgar1: None, Apgar5: None, Living: Fetal Demise, Birth Comments: at 14 w6d and then due to poor prognosis requested at 15w2d D&E    # 2 - Date: , Sex: None, Weight: None, GA: None, Delivery: Spontaneous , Apgar1: None, Apgar5: None, Living: None, Birth Comments: early first trimester SAB    # 3 - Date: 21, Sex: Female, Weight: 4000 g (8 lb 13.1 oz), GA: 40w1d, Delivery: , Low Transverse, Apgar1: 7, Apgar5: 9, Living: Living, Birth Comments: None    # 4 - Date: 02/15/24, Sex: Male, Weight: 3330 g (7 lb 5.5 oz), GA: 35w5d, Delivery: , Low Transverse, Apgar1: 7, Apgar5: 8, Living: Living, Birth Comments: None   Previouse breast reduction surgery? Yes    Lactation history:   Has patient previously breast fed: Yes   How long had patient previously breast fed: about 9 months   Previous breast feeding complications: Exclusive pump and bottle fed, Other (Comment) (difficulty latching)     Past Surgical History:   Procedure Laterality Date    DILATION AND EVACUATION  2019    TN  DELIVERY ONLY N/A 2021    Procedure:  SECTION ();  Surgeon: Vivian Rosales MD;  Location: [facility];  Service: Obstetrics    TN INDUCED  DILATION & EVACUATION N/A 2019    Procedure: DILATATION AND EVACUATION (D&E) 15 Weeks;  Surgeon: Aileen Gorman MD;  Location:  MAIN OR;  Service: Gynecology    TONSILLECTOMY          Birth information:  YOB: 2023   Time of birth: 8:36 PM   Sex: male   Delivery type: , Low Transverse   Birth Weight: 3330 g  (7 lb 5.5 oz)   Percent of Weight Change: -3%     Gestational Age: 35w5d   [unfilled]    Assessment     Breast and nipple assessment: cracked nipples    [place] Assessment: normal assessment    Feeding assessment: feeding well  LATCH:  Latch: Grasps breast, tongue down, lips flanged, rhythmic sucking   Audible Swallowing: Spontaneous and intermittent (24 hours old)   Type of Nipple: Everted (After stimulation)   Comfort (Breast/Nipple): Soft/non-tender   Hold (Positioning): Partial assist, teach one side, mother does other, staff holds   LATCH Score: 9         24 1100   Lactation Consultation   Reason for Consult 20 m;20 min;10 minute   Lactation Consultant Total Time 50   Risk Factors Hyperbilirubinemia   LATCH Documentation   Latch 2   Audible Swallowing 2   Type of Nipple 2   Comfort (Breast/Nipple) 2   Hold (Positioning) 1   LATCH Score 9   Having latch problems? No   Position(s) Used Cross Cradle   Breasts/Nipples   Left Breast Filling   Right Breast Filling   Left Nipple Everted;Cracked   Right Nipple Everted   Intervention Hand expression   Breastfeeding Progress Not yet established;Breastfeeding well   Patient Follow-Up   Lactation Consult Status 2   Follow-Up Type Inpatient;Call as needed   Other OB Lactation Documentation    Additional Problem Noted Sarah c/o painful, cracked left nipples. Discussed wet wound healing with barrier at home. Pumped exclusively for first baby. Learning to latch this baby comfortably. Hand expression is effective independently. Utilized her ability to hand express to demonstrate where Kevin's mouth needs to be on her anatomy to demonstrate the same physiologic function inside his mouth. Demonstrated the positive effects of  biological nurturing position on the depth of latch without  breastfeeding props and how to more effectively use them for comfort rather than for positioning. Kevin had been on phototherapy for hyperbilirubinemia as well as supplementation  with formula.       Feeding recommendations:  breast feed on demand    Information on hand expression given. Discussed benefits of knowing how to manually express breast including stimulating milk supply, softening nipple for latch and evacuating breast in the event of engorgement.    Reviewed how to bring baby to the breast so that his lower lip and chin touch the breast with his nose just above the nipple to encourage a wider, more asymmetric latch.    Encouraged parents to call for assistance, questions, and concerns about breastfeeding.     Indiana Sifuentes RN 2/18/2024 11:45 AM

## 2024-02-18 NOTE — PLAN OF CARE
Problem: POSTPARTUM  Goal: Experiences normal postpartum course  Description: INTERVENTIONS:  - Monitor maternal vital signs  - Assess uterine involution and lochia  Outcome: Completed  Goal: Appropriate maternal -  bonding  Description: INTERVENTIONS:  - Identify family support  - Assess for appropriate maternal/infant bonding   -Encourage maternal/infant bonding opportunities  - Referral to  or  as needed  Outcome: Completed  Goal: Establishment of infant feeding pattern  Description: INTERVENTIONS:  - Assess breast/bottle feeding  - Refer to lactation as needed  Outcome: Completed  Goal: Incision(s), wounds(s) or drain site(s) healing without S/S of infection  Description: INTERVENTIONS  - Assess and document dressing, incision, wound bed, drain sites and surrounding tissue  - Provide patient and family education  Outcome: Completed

## 2024-02-18 NOTE — PLAN OF CARE
Problem: POSTPARTUM  Goal: Experiences normal postpartum course  Description: INTERVENTIONS:  - Monitor maternal vital signs  - Assess uterine involution and lochia  Outcome: Progressing     Problem: POSTPARTUM  Goal: Appropriate maternal -  bonding  Description: INTERVENTIONS:  - Identify family support  - Assess for appropriate maternal/infant bonding   -Encourage maternal/infant bonding opportunities  - Referral to  or  as needed  Outcome: Progressing     Problem: POSTPARTUM  Goal: Establishment of infant feeding pattern  Description: INTERVENTIONS:  - Assess breast/bottle feeding  - Refer to lactation as needed  Outcome: Progressing

## 2024-02-20 ENCOUNTER — ROUTINE PRENATAL (OUTPATIENT)
Dept: OBGYN CLINIC | Facility: MEDICAL CENTER | Age: 34
End: 2024-02-20

## 2024-02-20 DIAGNOSIS — Z3A.35 35 WEEKS GESTATION OF PREGNANCY: Primary | ICD-10-CM

## 2024-02-20 DIAGNOSIS — O92.70 LACTATION DISORDER: ICD-10-CM

## 2024-02-20 PROCEDURE — 99024 POSTOP FOLLOW-UP VISIT: CPT | Performed by: OBSTETRICS & GYNECOLOGY

## 2024-02-20 NOTE — PROGRESS NOTES
Subjective     Sarah Napoles is a 33 y.o.  female who presents to the office 1 weeks status post repeat  for PPROM. Eating a regular diet without difficulty. Bowel movements are normal. Pain is controlled with current analgesics. Medications being used: acetaminophen and ibuprofen (OTC).    Patient decided to stop breast feeding, script for APNO sent    The following portions of the patient's history were reviewed and updated as appropriate: allergies, current medications, past family history, past medical history, past social history, past surgical history, and problem list.    Review of Systems  Pertinent items are noted in HPI.      Objective  LMP 2023 (Approximate)     General:  alert and oriented, in no acute distress   Abdomen: soft, bowel sounds active, non-tender   Incision:   Glue noted, healing well, no drainage, no erythema, no hernia, no seroma, no swelling, well approximated, no dehiscence, incision well approximated         Assessment      Doing well postoperatively.       Plan     1. Continue any current medications.  2. Wound care discussed.  3. Activity restrictions: no lifting more than 25 pounds  4. Follow up: 5 weeks for postpartum

## 2024-02-21 PROBLEM — O23.43 URINARY TRACT INFECTION IN MOTHER DURING THIRD TRIMESTER OF PREGNANCY: Status: RESOLVED | Noted: 2024-02-09 | Resolved: 2024-02-21

## 2024-02-24 LAB — PLACENTA IN STORAGE: NORMAL

## 2024-03-26 ENCOUNTER — TELEPHONE (OUTPATIENT)
Age: 34
End: 2024-03-26

## 2024-03-26 NOTE — TELEPHONE ENCOUNTER
Left message about registration of child in prenatal breastfeeding class. Requesting Email address to allow ability to attend prenatal breastfeeding class scheduled for 4/2/2024.

## 2024-04-05 ENCOUNTER — OFFICE VISIT (OUTPATIENT)
Age: 34
End: 2024-04-05

## 2024-04-05 VITALS
SYSTOLIC BLOOD PRESSURE: 124 MMHG | DIASTOLIC BLOOD PRESSURE: 80 MMHG | BODY MASS INDEX: 41.95 KG/M2 | HEIGHT: 70 IN | WEIGHT: 293 LBS

## 2024-04-05 DIAGNOSIS — Z98.891 STATUS POST REPEAT LOW TRANSVERSE CESAREAN SECTION: ICD-10-CM

## 2024-04-05 DIAGNOSIS — Z30.09 COUNSELING FOR BIRTH CONTROL REGARDING INTRAUTERINE DEVICE (IUD): ICD-10-CM

## 2024-04-05 PROCEDURE — 99024 POSTOP FOLLOW-UP VISIT: CPT | Performed by: OBSTETRICS & GYNECOLOGY

## 2024-04-05 NOTE — PROGRESS NOTES
"OB POSTPARTUM VISIT PROGRESS NOTE  Date of Encounter: 2024    Sarah Napoles    : 1990  (34 y.o.)  MR: [id number]    Subjective   Sarah is in for her postpartum visit. She is now . She delivered by repeat  section. She's generally doing well, denies current pain or bleeding issues, and has no significant depression issues. She is bottle feeding. We discussed all appropriate contraceptive options and she chooses IUD         Objective   EXAM:  GENERAL: alert, well appearing, and in no distress.  VITALS: Blood pressure 124/80, height 5' 10\" (1.778 m), weight 133 kg (293 lb), last menstrual period 2023, not currently breastfeeding.  BMI: Body mass index is 42.04 kg/m².  LUNGS:  unlabored breathing   BREASTS: Deferred  ABDOMEN:soft depressible  incision well healed    EXTREMITIES: All normal.  PELVIC:deferred    Assessment/Plan   Diagnoses and all orders for this visit:    Postpartum exam  Normal   Status post repeat low transverse  section  Incisions well healed   Counseling for birth control regarding intrauterine device (IUD)  We discussed all available IUD / risks and benefits of each reviewed as well as alternatives  Interested in Kyleena   Scheduled for  placement         Sade Taylor MD    "

## 2024-04-17 ENCOUNTER — OFFICE VISIT (OUTPATIENT)
Dept: FAMILY MEDICINE CLINIC | Facility: HOSPITAL | Age: 34
End: 2024-04-17
Payer: COMMERCIAL

## 2024-04-17 VITALS
DIASTOLIC BLOOD PRESSURE: 74 MMHG | TEMPERATURE: 97 F | WEIGHT: 289 LBS | BODY MASS INDEX: 41.37 KG/M2 | HEART RATE: 78 BPM | SYSTOLIC BLOOD PRESSURE: 124 MMHG | HEIGHT: 70 IN

## 2024-04-17 DIAGNOSIS — F33.0 MILD EPISODE OF RECURRENT MAJOR DEPRESSIVE DISORDER (HCC): ICD-10-CM

## 2024-04-17 DIAGNOSIS — F41.1 GENERALIZED ANXIETY DISORDER: ICD-10-CM

## 2024-04-17 DIAGNOSIS — F98.8 ADD (ATTENTION DEFICIT DISORDER) WITHOUT HYPERACTIVITY: Primary | ICD-10-CM

## 2024-04-17 PROBLEM — O99.341 DEPRESSION DURING PREGNANCY IN FIRST TRIMESTER: Status: RESOLVED | Noted: 2023-09-07 | Resolved: 2024-04-17

## 2024-04-17 PROBLEM — F32.A DEPRESSION DURING PREGNANCY IN FIRST TRIMESTER: Status: RESOLVED | Noted: 2023-09-07 | Resolved: 2024-04-17

## 2024-04-17 PROCEDURE — 99214 OFFICE O/P EST MOD 30 MIN: CPT | Performed by: NURSE PRACTITIONER

## 2024-04-17 RX ORDER — LISDEXAMFETAMINE DIMESYLATE CAPSULES 60 MG/1
60 CAPSULE ORAL EVERY MORNING
Qty: 30 CAPSULE | Refills: 0 | Status: SHIPPED | OUTPATIENT
Start: 2024-04-17 | End: 2024-04-22 | Stop reason: SDUPTHER

## 2024-04-17 NOTE — ASSESSMENT & PLAN NOTE
She is requesting to restart Vyvanse at previous 60 mg dose.   This will be sent today.   Plan to f/u in 3 months.

## 2024-04-17 NOTE — PROGRESS NOTES
Assessment/Plan:    ADD (attention deficit disorder) without hyperactivity  She is requesting to restart Vyvanse at previous 60 mg dose.   This will be sent today.   Plan to f/u in 3 months.     Generalized anxiety disorder  Fo the most part controlled.   We discussed Buspar as adjunct as pt does not tolerate higher doses of sertraline.   We discussed factors that could be affecting her mood such as postpartum hormones, adjusting to life with 3 children and uncontrolled ADHD symptoms.   We will continue with current sertraline dose. Hold off on Buspar and restart Vyvanse.   Plan to f/u in 3 months to reassess. Call if worsening before then.     Mild episode of recurrent major depressive disorder (HCC)  PHQ-9=5  She does not feel overly depressed but does admit to some depressive symptoms.   See above plan.        Diagnoses and all orders for this visit:    ADD (attention deficit disorder) without hyperactivity  -     lisdexamfetamine (Vyvanse) 60 MG capsule; Take 1 capsule (60 mg total) by mouth every morning Max Daily Amount: 60 mg    Mild episode of recurrent major depressive disorder (HCC)    Generalized anxiety disorder          Subjective:      Patient ID: Sarah Napoles is a 34 y.o. female.    Eight weeks postpartum. She has stopped breastfeeding. She would like to restart  Vyvanse.   Mood is good for the most part. She is on sertraline. She has gone up before and over time becomes disconnected. She has been maintained on 50 mg for awhile. She denies feeling depressed but not herself.         The following portions of the patient's history were reviewed and updated as appropriate: allergies, current medications, past family history, past medical history, past social history, past surgical history and problem list.    Review of Systems   Constitutional:  Positive for appetite change and fatigue.   Psychiatric/Behavioral:  Positive for decreased concentration, dysphoric mood and sleep disturbance.  Negative for agitation and suicidal ideas. The patient is nervous/anxious. The patient is not hyperactive.          Objective:  Vitals:    04/17/24 0956   BP: 124/74   Pulse: 78   Temp: (!) 97 °F (36.1 °C)      Physical Exam  Vitals reviewed.   Constitutional:       Appearance: Normal appearance.   Cardiovascular:      Rate and Rhythm: Normal rate and regular rhythm.      Heart sounds: Normal heart sounds. No murmur heard.  Pulmonary:      Effort: Pulmonary effort is normal.      Breath sounds: Normal breath sounds.   Skin:     General: Skin is warm and dry.   Neurological:      Mental Status: She is alert and oriented to person, place, and time.   Psychiatric:         Mood and Affect: Mood normal.         Behavior: Behavior normal.         Thought Content: Thought content normal.         Judgment: Judgment normal.

## 2024-04-17 NOTE — ASSESSMENT & PLAN NOTE
PHQ-9=5  She does not feel overly depressed but does admit to some depressive symptoms.   See above plan.

## 2024-04-17 NOTE — ASSESSMENT & PLAN NOTE
Fo the most part controlled.   We discussed Buspar as adjunct as pt does not tolerate higher doses of sertraline.   We discussed factors that could be affecting her mood such as postpartum hormones, adjusting to life with 3 children and uncontrolled ADHD symptoms.   We will continue with current sertraline dose. Hold off on Buspar and restart Vyvanse.   Plan to f/u in 3 months to reassess. Call if worsening before then.

## 2024-04-22 DIAGNOSIS — F98.8 ADD (ATTENTION DEFICIT DISORDER) WITHOUT HYPERACTIVITY: ICD-10-CM

## 2024-04-22 RX ORDER — LISDEXAMFETAMINE DIMESYLATE 60 MG/1
60 CAPSULE ORAL EVERY MORNING
Qty: 30 CAPSULE | Refills: 0 | Status: SHIPPED | OUTPATIENT
Start: 2024-04-22

## 2024-04-22 NOTE — TELEPHONE ENCOUNTER
[facility]  Please send to [facility] in [place].    Script must say BRAND NAME REQUIRED

## 2024-04-23 ENCOUNTER — PROCEDURE VISIT (OUTPATIENT)
Dept: OBGYN CLINIC | Facility: MEDICAL CENTER | Age: 34
End: 2024-04-23
Payer: COMMERCIAL

## 2024-04-23 VITALS
DIASTOLIC BLOOD PRESSURE: 70 MMHG | BODY MASS INDEX: 41.52 KG/M2 | WEIGHT: 290 LBS | HEIGHT: 70 IN | SYSTOLIC BLOOD PRESSURE: 120 MMHG

## 2024-04-23 DIAGNOSIS — Z30.430 ENCOUNTER FOR INSERTION OF PROGESTIN-RELEASING INTRAUTERINE CONTRACEPTIVE DEVICE (IUD): Primary | ICD-10-CM

## 2024-04-23 LAB — SL AMB POCT URINE HCG: NEGATIVE

## 2024-04-23 PROCEDURE — 58300 INSERT INTRAUTERINE DEVICE: CPT | Performed by: OBSTETRICS & GYNECOLOGY

## 2024-04-23 PROCEDURE — 81025 URINE PREGNANCY TEST: CPT | Performed by: OBSTETRICS & GYNECOLOGY

## 2024-04-23 NOTE — PROGRESS NOTES
Iud insertions    Date/Time: 4/23/2024 8:45 AM    Performed by: Sade Taylor MD  Authorized by: Sade Taylor MD    Other Assisting Provider: No    Verbal consent obtained?: Yes    Written consent obtained?: Yes    Risks and benefits: Risks, benefits and alternatives were discussed    Consent given by:  Patient  Patient states understanding of procedure being performed: Yes    Patient's understanding of procedure matches consent: Yes    Procedure consent matches procedure scheduled: Yes    Patient identity confirmed:  Verbally with patient  Procedure:     Negative urine pregnancy test: yes      Cervix cleaned and prepped: yes      Speculum placed in vagina: yes      Tenaculum applied to cervix: yes      Uterus sounded: yes      Uterus sound depth (cm):  9    IUD inserted with no complications: yes      IUD type:  Kyleena    Strings trimmed: yes    Comments:      Follow  up in one month

## 2024-04-29 ENCOUNTER — NURSE TRIAGE (OUTPATIENT)
Age: 34
End: 2024-04-29

## 2024-04-29 NOTE — TELEPHONE ENCOUNTER
Pt discussed concerns through mychart, advised to call back if symptoms worsen. She is scheduled for appt tomorrow at 11:30. Pt thankful.

## 2024-04-29 NOTE — TELEPHONE ENCOUNTER
Regarding: IUD Issues  ----- Message from Mariya Ruffin sent at 4/29/2024  5:11 PM EDT -----  Pt called to sched an appt for this week per  (see TE) pt has been having issues with IUD and discharge and doctor wanted to see her for possible infection. Pt has some questions as what to look out for she mentioned the doctor said a fever and other things pt wanted to speak with a nurse to see what she needs to keep an eye on.There is no appts until May 7th at both offices she sees the doctor at pt needs to sched this week per doctor. Please call pt to further discuss symptoms and concerns.

## 2024-04-30 ENCOUNTER — OFFICE VISIT (OUTPATIENT)
Dept: OBGYN CLINIC | Facility: CLINIC | Age: 34
End: 2024-04-30
Payer: COMMERCIAL

## 2024-04-30 VITALS
DIASTOLIC BLOOD PRESSURE: 72 MMHG | HEIGHT: 70 IN | SYSTOLIC BLOOD PRESSURE: 122 MMHG | WEIGHT: 287 LBS | BODY MASS INDEX: 41.09 KG/M2

## 2024-04-30 DIAGNOSIS — T83.9XXA COMPLICATION OF INTRAUTERINE DEVICE (IUD), UNSPECIFIED COMPLICATION, INITIAL ENCOUNTER (HCC): ICD-10-CM

## 2024-04-30 DIAGNOSIS — Z30.431 IUD CHECK UP: ICD-10-CM

## 2024-04-30 DIAGNOSIS — O26.899 PELVIC PAIN IN PREGNANCY: Primary | ICD-10-CM

## 2024-04-30 DIAGNOSIS — R10.2 PELVIC PAIN IN PREGNANCY: Primary | ICD-10-CM

## 2024-04-30 PROBLEM — Z87.59 HISTORY OF PLACENTA ABRUPTION: Status: RESOLVED | Noted: 2023-09-07 | Resolved: 2024-04-30

## 2024-04-30 PROBLEM — B95.1 POSITIVE GBS TEST: Status: RESOLVED | Noted: 2023-09-07 | Resolved: 2024-04-30

## 2024-04-30 PROBLEM — Z98.891 STATUS POST REPEAT LOW TRANSVERSE CESAREAN SECTION: Status: RESOLVED | Noted: 2023-09-07 | Resolved: 2024-04-30

## 2024-04-30 PROBLEM — O99.210 OBESITY IN PREGNANCY: Status: RESOLVED | Noted: 2023-09-07 | Resolved: 2024-04-30

## 2024-04-30 PROBLEM — O34.219 PREVIOUS CESAREAN SECTION COMPLICATING PREGNANCY: Status: RESOLVED | Noted: 2023-09-07 | Resolved: 2024-04-30

## 2024-04-30 PROBLEM — O26.643 CHOLESTASIS DURING PREGNANCY IN THIRD TRIMESTER: Status: RESOLVED | Noted: 2024-02-03 | Resolved: 2024-04-30

## 2024-04-30 PROBLEM — O09.899 HISTORY OF PRETERM DELIVERY, CURRENTLY PREGNANT: Status: RESOLVED | Noted: 2023-09-12 | Resolved: 2024-04-30

## 2024-04-30 PROBLEM — O36.8130 DECREASED FETAL MOVEMENTS IN THIRD TRIMESTER: Status: RESOLVED | Noted: 2024-02-15 | Resolved: 2024-04-30

## 2024-04-30 PROCEDURE — 99213 OFFICE O/P EST LOW 20 MIN: CPT | Performed by: NURSE PRACTITIONER

## 2024-04-30 RX ORDER — TRETINOIN 0.5 MG/G
CREAM TOPICAL
COMMUNITY
Start: 2024-04-15

## 2024-04-30 RX ORDER — LEVONORGESTREL 19.5 MG/1
1 INTRAUTERINE DEVICE INTRAUTERINE
COMMUNITY

## 2024-04-30 NOTE — PROGRESS NOTES
"Assessment/Plan:      Diagnoses and all orders for this visit:    Pelvic pain in pregnancy  -     US pelvis complete w transvaginal; Future    Complication of intrauterine device (IUD), unspecified complication, initial encounter ([facility])  -     US pelvis complete w transvaginal; Future    IUD check up    Other orders  -     Discontinue: BETAMETHASONE PO  -     tretinoin (REFISSA) 0.05 % cream; PLEASE SEE ATTACHED FOR DETAILED DIRECTIONS  -     levonorgestrel (Kyleena) 19.5 MG intrauterine device; 1 each by Intrauterine Device route Once every 5 years      .  - reviewed kyleena is effective for 5 years.  Common side effects reviewed including irregular vaginal bleeding including amenorrhea.  - wet/ koh -negative for infection  - pelvic US ordered   -- can use IBU or tylenol as needed for back pain  - will call/ [employer] message with results  - signs and symptoms to report reviewed     RTO 2024 for annual exam or sooner as needed     Subjective:     Patient ID: Sarah Napoles is a 34 y.o. female.    HPI  here 1 week s/p Kyleena insertion with complaints of back pain, pelvic pain and different discharge. LMP prior to IUD insertion.  Kyleena IUD was inserted 24.  Patient states there was a puncture to her cervix that needed cautery.  Since insertion she has had green-yellow discharge with a foul odor.  Discharge today has significantly improved.  Is concerned with random intermittent back pain that occurs 3-4 times per day and lasts 30 seconds to 1 minute.  Will also have abdominal pelvic cramping at night.  Denies alleviating or aggravating factors.  Has not needed to take any OTC remedies.  Has not been sexually active.    Last pap smear 23 NILM/ HR HPV(-)      Review of Systems   Constitutional:  Negative for chills, fatigue and fever.   Respiratory: Negative.     Cardiovascular: Negative.    Genitourinary:  Positive for pelvic pain.         Objective:  /72   Ht 5' 10\" (1.778 m)   Wt " 130 kg (287 lb)   BMI 41.18 kg/m²      Physical Exam  Vitals reviewed. Exam conducted with a chaperone present.   Constitutional:       Appearance: Normal appearance.   Abdominal:      Hernia: There is no hernia in the left inguinal area or right inguinal area.   Genitourinary:     General: Normal vulva.      Exam position: Lithotomy position.      Labia:         Right: No rash, tenderness, lesion or injury.         Left: No rash, tenderness, lesion or injury.       Vagina: Normal. No vaginal discharge.      Cervix: Normal. No discharge.            Comments: IUD strings easily visualized on speculum exam approximately 3 to 4 cm.   No lesions   No discharge     Lymphadenopathy:      Lower Body: No right inguinal adenopathy. No left inguinal adenopathy.   Neurological:      Mental Status: She is alert and oriented to person, place, and time.   Psychiatric:         Mood and Affect: Mood normal.         Behavior: Behavior normal.

## 2024-05-04 ENCOUNTER — HOSPITAL ENCOUNTER (OUTPATIENT)
Dept: RADIOLOGY | Facility: HOSPITAL | Age: 34
Discharge: HOME | End: 2024-05-04
Payer: COMMERCIAL

## 2024-05-04 DIAGNOSIS — T83.9XXA COMPLICATION OF INTRAUTERINE DEVICE (IUD), UNSPECIFIED COMPLICATION, INITIAL ENCOUNTER (HCC): ICD-10-CM

## 2024-05-04 DIAGNOSIS — R10.2 PELVIC PAIN IN PREGNANCY: ICD-10-CM

## 2024-05-04 DIAGNOSIS — O26.899 PELVIC PAIN IN PREGNANCY: ICD-10-CM

## 2024-05-04 PROCEDURE — 76830 TRANSVAGINAL US NON-OB: CPT

## 2024-05-04 PROCEDURE — 76856 US EXAM PELVIC COMPLETE: CPT

## 2024-05-27 DIAGNOSIS — F98.8 ADD (ATTENTION DEFICIT DISORDER) WITHOUT HYPERACTIVITY: ICD-10-CM

## 2024-05-28 RX ORDER — LISDEXAMFETAMINE DIMESYLATE 60 MG/1
60 CAPSULE ORAL EVERY MORNING
Qty: 30 CAPSULE | Refills: 0 | Status: SHIPPED | OUTPATIENT
Start: 2024-05-28

## 2024-06-03 ENCOUNTER — OFFICE VISIT (OUTPATIENT)
Dept: FAMILY MEDICINE CLINIC | Facility: HOSPITAL | Age: 34
End: 2024-06-03
Payer: COMMERCIAL

## 2024-06-03 VITALS
OXYGEN SATURATION: 98 % | HEIGHT: 70 IN | HEART RATE: 68 BPM | DIASTOLIC BLOOD PRESSURE: 68 MMHG | TEMPERATURE: 97.5 F | SYSTOLIC BLOOD PRESSURE: 126 MMHG | BODY MASS INDEX: 41.8 KG/M2 | WEIGHT: 292 LBS

## 2024-06-03 DIAGNOSIS — Z00.00 ANNUAL PHYSICAL EXAM: Primary | ICD-10-CM

## 2024-06-03 DIAGNOSIS — E28.2 PCOS (POLYCYSTIC OVARIAN SYNDROME): ICD-10-CM

## 2024-06-03 PROCEDURE — 99395 PREV VISIT EST AGE 18-39: CPT | Performed by: NURSE PRACTITIONER

## 2024-06-03 NOTE — PROGRESS NOTES
Adult Annual Physical  Name: Sarah Napoles      : 1990      MRN: [id number]  Encounter Provider: OSCAR Carreon  Encounter Date: 6/3/2024   Encounter department: [facility] PRIMARY CARE [id number]     Assessment & Plan   1. Annual physical exam  2. PCOS (polycystic ovarian syndrome)  Assessment & Plan:  Will update labs.   I did encourage her to get back on metformin as this was highly effective for her prior.   Will have her update blood work and go from there.   Orders:  -     CBC and differential; Future  -     Comprehensive metabolic panel; Future  -     Hemoglobin A1C; Future  -     Lipid panel; Future  -     TSH, 3rd generation with Free T4 reflex; Future  -     CBC and differential  -     Comprehensive metabolic panel  -     Hemoglobin A1C  -     Lipid panel  -     TSH, 3rd generation with Free T4 reflex  Immunizations and preventive care screenings were discussed with patient today. Appropriate education was printed on patient's after visit summary.    Counseling:  Alcohol/drug use: discussed moderation in alcohol intake, the recommendations for healthy alcohol use, and avoidance of illicit drug use.  Dental Health: discussed importance of regular tooth brushing, flossing, and dental visits.  Injury prevention: discussed safety/seat belts, safety helmets, smoke detectors, carbon dioxide detectors, and smoking near bedding or upholstery.  Sexual health: discussed sexually transmitted diseases, partner selection, use of condoms, avoidance of unintended pregnancy, and contraceptive alternatives.  Exercise: the importance of regular exercise/physical activity was discussed. Recommend exercise 3-5 times per week for at least 30 minutes.       Depression Screening and Follow-up Plan: Patient was screened for depression during today's encounter. They screened negative with a PHQ-9 score of 0.        History of Present Illness     Adult Annual Physical:  Patient presents for annual  "physical. Struggling to lose weight. Weight fluctuates. Eats relatively healthy. Walks. Has diagnosis of PCOS. Prior to first pregnancy she was on metformin and lost 40 pounds. Diagnosed with insulin resistance. Denies h/o gestational DM. .     Diet and Physical Activity:  - Diet/Nutrition: well balanced diet.  - Exercise: walking.    Depression Screening:    - PHQ-9 Score: 0    General Health:  - Sleep: sleeps well.  - Hearing: normal hearing bilateral ears.  - Vision: no vision problems.  - Dental: regular dental visits.    /GYN Health:  - Follows with GYN: yes.   - Menopause: premenopausal.   - Contraception: IUD placement.      Review of Systems   Constitutional:  Positive for unexpected weight change. Negative for activity change, appetite change, chills, diaphoresis, fatigue and fever.   HENT: Negative.     Eyes: Negative.    Respiratory: Negative.     Cardiovascular: Negative.    Gastrointestinal: Negative.    Endocrine: Negative.    Genitourinary: Negative.    Musculoskeletal: Negative.    Skin: Negative.    Neurological: Negative.    Hematological: Negative.    Psychiatric/Behavioral: Negative.           Objective     /68 (BP Location: Left arm, Patient Position: Sitting, Cuff Size: Standard)   Pulse 68   Temp 97.5 °F (36.4 °C) (Tympanic)   Ht 5' 10\" (1.778 m)   Wt 132 kg (292 lb)   SpO2 98%   BMI 41.90 kg/m²     Physical Exam  Vitals reviewed.   Constitutional:       Appearance: Normal appearance. She is well-developed. She is obese.   HENT:      Head: Normocephalic and atraumatic.      Right Ear: Tympanic membrane, ear canal and external ear normal.      Left Ear: Tympanic membrane, ear canal and external ear normal.      Nose: Nose normal.      Mouth/Throat:      Mouth: Mucous membranes are moist.      Pharynx: Oropharynx is clear.   Eyes:      Conjunctiva/sclera: Conjunctivae normal.      Pupils: Pupils are equal, round, and reactive to light.   Neck:      Thyroid: No thyromegaly. "   Cardiovascular:      Rate and Rhythm: Normal rate and regular rhythm.      Heart sounds: Normal heart sounds. No murmur heard.  Pulmonary:      Effort: Pulmonary effort is normal.      Breath sounds: Normal breath sounds.   Abdominal:      General: Abdomen is flat. Bowel sounds are normal.      Palpations: Abdomen is soft. There is no hepatomegaly or splenomegaly.      Tenderness: There is no abdominal tenderness.   Musculoskeletal:         General: Normal range of motion.      Cervical back: Normal range of motion and neck supple.   Lymphadenopathy:      Cervical: No cervical adenopathy.   Skin:     General: Skin is warm and dry.      Capillary Refill: Capillary refill takes less than 2 seconds.   Neurological:      General: No focal deficit present.      Mental Status: She is alert and oriented to person, place, and time.   Psychiatric:         Mood and Affect: Mood normal.         Behavior: Behavior normal.         Thought Content: Thought content normal.         Judgment: Judgment normal.       Administrative Statements

## 2024-06-03 NOTE — ASSESSMENT & PLAN NOTE
Will update labs.   I did encourage her to get back on metformin as this was highly effective for her prior.   Will have her update blood work and go from there.

## 2024-06-10 ENCOUNTER — OFFICE VISIT (OUTPATIENT)
Dept: OBGYN CLINIC | Facility: MEDICAL CENTER | Age: 34
End: 2024-06-10
Payer: COMMERCIAL

## 2024-06-10 VITALS
SYSTOLIC BLOOD PRESSURE: 108 MMHG | BODY MASS INDEX: 41.09 KG/M2 | WEIGHT: 287 LBS | HEIGHT: 70 IN | DIASTOLIC BLOOD PRESSURE: 60 MMHG

## 2024-06-10 DIAGNOSIS — Z97.5 BREAKTHROUGH BLEEDING WITH IUD: ICD-10-CM

## 2024-06-10 DIAGNOSIS — N92.1 BREAKTHROUGH BLEEDING WITH IUD: ICD-10-CM

## 2024-06-10 DIAGNOSIS — Z78.9 USES BIRTH CONTROL: ICD-10-CM

## 2024-06-10 DIAGNOSIS — Z30.432 ENCOUNTER FOR IUD REMOVAL: Primary | ICD-10-CM

## 2024-06-10 PROBLEM — Z30.431 IUD CHECK UP: Status: RESOLVED | Noted: 2024-04-30 | Resolved: 2024-06-10

## 2024-06-10 PROCEDURE — 58301 REMOVE INTRAUTERINE DEVICE: CPT | Performed by: OBSTETRICS & GYNECOLOGY

## 2024-06-10 RX ORDER — NORGESTIMATE AND ETHINYL ESTRADIOL 0.25-0.035
1 KIT ORAL DAILY
Qty: 84 TABLET | Refills: 3 | Status: SHIPPED | OUTPATIENT
Start: 2024-06-10

## 2024-06-10 NOTE — PROGRESS NOTES
Iud removal    Performed by: Sade Taylor MD  Authorized by: Sade Taylor MD    Procedure: IUD removal    Consent obtained by patient, parent, or legal power of  - including discussion of procedure risks and benefits, patient questions answered, and patient education provided.: yes    Reason for removal: patient request    Strings visualized: yes    IUD grasped by forceps: yes    IUD removed: yes    Date/Time of Removal:  6/10/2024 10:39 AM  Removed without complications: yes    IUD intact: yes

## 2024-06-13 LAB
ALBUMIN SERPL-MCNC: 4.5 G/DL (ref 3.9–4.9)
ALBUMIN/GLOB SERPL: 1.6 {RATIO}
ALP SERPL-CCNC: 71 IU/L (ref 44–121)
ALT SERPL-CCNC: 50 IU/L (ref 0–32)
AST SERPL-CCNC: 30 IU/L (ref 0–40)
BASOPHILS # BLD AUTO: 0.1 X10E3/UL (ref 0–0.2)
BASOPHILS NFR BLD AUTO: 1 %
BILIRUB SERPL-MCNC: 0.2 MG/DL (ref 0–1.2)
BUN SERPL-MCNC: 11 MG/DL (ref 6–20)
BUN/CREAT SERPL: 13 (ref 9–23)
CALCIUM SERPL-MCNC: 9.4 MG/DL (ref 8.7–10.2)
CHLORIDE SERPL-SCNC: 107 MMOL/L (ref 96–106)
CHOLEST SERPL-MCNC: 178 MG/DL (ref 100–199)
CHOLEST/HDLC SERPL: 3.8 RATIO (ref 0–4.4)
CO2 SERPL-SCNC: 22 MMOL/L (ref 20–29)
CREAT SERPL-MCNC: 0.83 MG/DL (ref 0.57–1)
EGFR: 95 ML/MIN/1.73
EOSINOPHIL # BLD AUTO: 0.1 X10E3/UL (ref 0–0.4)
EOSINOPHIL NFR BLD AUTO: 1 %
ERYTHROCYTE [DISTWIDTH] IN BLOOD BY AUTOMATED COUNT: 14.3 % (ref 11.7–15.4)
EST. AVERAGE GLUCOSE BLD GHB EST-MCNC: 105 MG/DL
GLOBULIN SER-MCNC: 2.8 G/DL (ref 1.5–4.5)
GLUCOSE SERPL-MCNC: 98 MG/DL (ref 70–99)
HBA1C MFR BLD: 5.3 % (ref 4.8–5.6)
HCT VFR BLD AUTO: 40.2 % (ref 34–46.6)
HDLC SERPL-MCNC: 47 MG/DL
HGB BLD-MCNC: 13.4 G/DL (ref 11.1–15.9)
IMM GRANULOCYTES # BLD: 0 X10E3/UL (ref 0–0.1)
IMM GRANULOCYTES NFR BLD: 0 %
LDLC SERPL CALC-MCNC: 117 MG/DL (ref 0–99)
LYMPHOCYTES # BLD AUTO: 2 X10E3/UL (ref 0.7–3.1)
LYMPHOCYTES NFR BLD AUTO: 37 %
MCH RBC QN AUTO: 28.8 PG (ref 26.6–33)
MCHC RBC AUTO-ENTMCNC: 33.3 G/DL (ref 31.5–35.7)
MCV RBC AUTO: 86 FL (ref 79–97)
MONOCYTES # BLD AUTO: 0.4 X10E3/UL (ref 0.1–0.9)
MONOCYTES NFR BLD AUTO: 7 %
NEUTROPHILS # BLD AUTO: 2.8 X10E3/UL (ref 1.4–7)
NEUTROPHILS NFR BLD AUTO: 54 %
PLATELET # BLD AUTO: 292 X10E3/UL (ref 150–450)
POTASSIUM SERPL-SCNC: 4.4 MMOL/L (ref 3.5–5.2)
PROT SERPL-MCNC: 7.3 G/DL (ref 6–8.5)
RBC # BLD AUTO: 4.66 X10E6/UL (ref 3.77–5.28)
SL AMB VLDL CHOLESTEROL CALC: 14 MG/DL (ref 5–40)
SODIUM SERPL-SCNC: 141 MMOL/L (ref 134–144)
TRIGL SERPL-MCNC: 72 MG/DL (ref 0–149)
TSH SERPL DL<=0.005 MIU/L-ACNC: 0.67 UIU/ML (ref 0.45–4.5)
WBC # BLD AUTO: 5.3 X10E3/UL (ref 3.4–10.8)

## 2024-06-18 DIAGNOSIS — E28.2 PCOS (POLYCYSTIC OVARIAN SYNDROME): Primary | ICD-10-CM

## 2024-06-18 RX ORDER — METFORMIN HYDROCHLORIDE 500 MG/1
500 TABLET, EXTENDED RELEASE ORAL
Qty: 30 TABLET | Refills: 1 | Status: SHIPPED | OUTPATIENT
Start: 2024-06-18

## 2024-06-28 DIAGNOSIS — F98.8 ADD (ATTENTION DEFICIT DISORDER) WITHOUT HYPERACTIVITY: ICD-10-CM

## 2024-07-01 RX ORDER — LISDEXAMFETAMINE DIMESYLATE 60 MG/1
60 CAPSULE ORAL EVERY MORNING
Qty: 30 CAPSULE | Refills: 0 | Status: SHIPPED | OUTPATIENT
Start: 2024-07-01

## 2024-07-04 DIAGNOSIS — F41.1 GENERALIZED ANXIETY DISORDER: ICD-10-CM

## 2024-07-12 DIAGNOSIS — E28.2 PCOS (POLYCYSTIC OVARIAN SYNDROME): ICD-10-CM

## 2024-07-12 RX ORDER — METFORMIN HYDROCHLORIDE 500 MG/1
500 TABLET, EXTENDED RELEASE ORAL
Qty: 100 TABLET | Refills: 1 | Status: SHIPPED | OUTPATIENT
Start: 2024-07-12

## 2024-08-06 DIAGNOSIS — F98.8 ADD (ATTENTION DEFICIT DISORDER) WITHOUT HYPERACTIVITY: ICD-10-CM

## 2024-08-06 RX ORDER — LISDEXAMFETAMINE DIMESYLATE 60 MG/1
60 CAPSULE ORAL EVERY MORNING
Qty: 30 CAPSULE | Refills: 0 | Status: SHIPPED | OUTPATIENT
Start: 2024-08-06

## 2024-09-13 DIAGNOSIS — F98.8 ADD (ATTENTION DEFICIT DISORDER) WITHOUT HYPERACTIVITY: ICD-10-CM

## 2024-09-13 RX ORDER — LISDEXAMFETAMINE DIMESYLATE 60 MG/1
60 CAPSULE ORAL EVERY MORNING
Qty: 30 CAPSULE | Refills: 0 | Status: SHIPPED | OUTPATIENT
Start: 2024-09-13

## 2024-09-13 NOTE — TELEPHONE ENCOUNTER
Requested medication(s) are due for refill today: Yes  Patient has already received a courtesy refill: No  Other reason request has been forwarded to provider: are you able to complete refill, PCP Out

## 2024-09-24 ENCOUNTER — TELEPHONE (OUTPATIENT)
Dept: DERMATOLOGY | Facility: CLINIC | Age: 34
End: 2024-09-24

## 2024-09-24 NOTE — TELEPHONE ENCOUNTER
Called pt regarding her appt with Dr. Bradshaw on 11/14. Provider is leaving the practice and appt needs to be r/s. LM that appt has been rescheduled for 11/18 @ 9:40 with Dr. Ko in [place]. Advised pt to call the office to confirm new appt.

## 2024-10-11 NOTE — PATIENT INSTRUCTIONS
Laceration Repair      Procedure: Laceration Repair    Indication: Laceration    Consent: Verbal    Tetanus status reviewed    Location: Right Second toe    Length: 2 cm    Preparation: Irrigation with Sterile Saline.    Anesthesia/Sedation: None      Treatment/Exploration: Wound explored, no foreign bodies found     Closure: The wound was closed with Tissue Adhesive.    Patient Status: The patient tolerated the procedure well: Yes. There were no complications.       Indu Christopher PA-C  10/10/24 2018     Pregnancy at 7 to [address]:   Changes happening to your body:  Pregnancy hormones may cause your body to go through many changes during this stage of your pregnancy  You may feel more tired than usual, and have mood swings, nausea and vomiting, and headaches  Your breasts may feel tender and swollen and you may urinate more frequently  Seek care immediately if:   · You have pain or cramping in your abdomen or low back  · You have heavy vaginal bleeding or clotting  · You pass material that looks like tissue or large clots  Collect the material and bring it with you  Call your doctor or obstetrician if:   · You have light bleeding  · You have chills or a fever  · You have vaginal itching, burning, or pain  · You have yellow, green, white, or foul-smelling vaginal discharge  · You have pain or burning when you urinate, less urine than usual, or pink or bloody urine  · You have questions or concerns about your condition or care  How to care for yourself at this stage of your pregnancy:   · Manage nausea and vomiting  Avoid fatty and spicy foods  Eat small meals throughout the day instead of large meals  Amena may help to decrease nausea  Ask your healthcare provider about other ways of decreasing nausea and vomiting  · Eat a variety of healthy foods  Healthy foods include fruits, vegetables, whole-grain breads, low-fat dairy foods, beans, lean meats, and fish  Drink liquids as directed  Ask how much liquid to drink each day and which liquids are best for you  Limit caffeine to less than 200 milligrams each day  Limit your intake of fish to 2 servings each week  Choose fish low in mercury such as canned light tuna, shrimp, salmon, cod, or tilapia  Do not  eat fish high in mercury such as swordfish, tilefish, michelle mackerel, and shark  · Take prenatal vitamins as directed    Your need for certain vitamins and minerals, such as folic acid, increases during pregnancy  Prenatal vitamins provide some of the extra vitamins and minerals you need  Prenatal vitamins may also help to decrease the risk of certain birth defects  · Ask how much weight you should gain each month  Too much or too little weight gain can be unhealthy for you and your baby  · Do not smoke  Smoking increases your risk of a miscarriage and other health problems during your pregnancy  Smoking can cause your baby to be born too early or weigh less at birth  Quit smoking as soon as you think you might be pregnant  Ask your healthcare provider for information if you need help quitting  · Do not drink alcohol  Alcohol passes from your body to your baby through the placenta  It can affect your baby's brain development and cause fetal alcohol syndrome (FAS)  FAS is a group of conditions that causes mental, behavior, and growth problems  · Talk to your healthcare provider before you take any medicines  Many medicines may harm your baby if you take them when you are pregnant  Do not take any medicines, vitamins, herbs, or supplements without first talking to your healthcare provider  Never use illegal or street drugs (such as marijuana or cocaine) while you are pregnant  Safety tips during pregnancy:   · Avoid hot tubs and saunas  Do not use a hot tub or sauna while you are pregnant, especially during your first trimester  Hot tubs and saunas may raise your baby's temperature and increase the risk of birth defects  · Avoid toxoplasmosis  This is an infection caused by eating raw meat or being around infected cat feces  It can cause birth defects, miscarriages, and other problems  Wash your hands after you touch raw meat  Make sure any meat is well-cooked before you eat it  Avoid raw eggs and unpasteurized milk  Use gloves or ask someone else to clean your cat's litter box while you are pregnant      Changes that are happening with your baby:  By 10 weeks, your baby will be about 2½ inches long from the top of the head to the rump (baby's bottom)  Your baby weighs about ½ ounce  Major body organs, such as the brain, heart, and lungs, are forming  Your baby's facial features are also starting to form  Prenatal care:  Prenatal care is a series of visits with your healthcare provider throughout your pregnancy  During the first 28 weeks of your pregnancy, you will see your healthcare provider 1 time each month  Prenatal care can help prevent problems during pregnancy and childbirth  Your healthcare provider will check your blood pressure and weight  Your baby's heart rate will also be checked  You may also need the following at some visits:  · A pelvic exam  allows your healthcare provider to see your cervix (the bottom part of your uterus)  Your healthcare provider will use a speculum to open your vagina  He or she will check the size and shape of your uterus  You may also have a Pap smear at your first prenatal visit  This is a test to check your cervix for abnormal cells  · Blood tests  may be done to check for any of the following:     ? Gestational diabetes or anemia (low iron level)    ? Blood type or Rh factor, or certain birth defects    ? Immunity to certain diseases, such as chickenpox or rubella    ? An infection, such as a sexually transmitted infection, HIV, or hepatitis B    · Hepatitis B  may need to be prevented or treated  Hepatitis B is inflammation of the liver caused by the hepatitis B virus (HBV)  HBV can spread from a mother to her baby during delivery  You will be checked for HBV as early as possible in the first trimester of each pregnancy  You need the test even if you received the hepatitis B vaccine or were tested before  You may need to have an HBV infection treated before you give birth  · Urine tests  may also be done to check for sugar and protein  These can be signs of gestational diabetes or preeclampsia   Urine tests may also be done to check for signs of infection  · A fetal ultrasound  shows pictures of your baby inside your uterus  The pictures are used to check your baby's development, movement, and position  · Genetic disorder screening tests  may be offered to you  These screening tests check your baby's risk for genetic disorders such as Down syndrome  A screening test includes a blood test and ultrasound  Follow up with your doctor or obstetrician as directed:  Go to all prenatal visits  Write down your questions so you remember to ask them during your visits  © Copyright [address] Information is for End User's use only and may not be sold, redistributed or otherwise used for commercial purposes  All illustrations and images included in CareNotes® are the copyrighted property of A D A M , Inc  or [address] [place], [place] [address]luis eduardo   The above information is an  only  It is not intended as medical advice for individual conditions or treatments  Talk to your doctor, nurse or pharmacist before following any medical regimen to see if it is safe and effective for you  Pregnancy at 11 to 14 Weeks   AMBULATORY CARE:   Changes happening to your body: You are now at the end of your first trimester and entering your second trimester  Morning sickness usually goes away by this time  You may have other symptoms such as fatigue, frequent urination, and headaches  You may have gained 2 to 4 pounds by now  Seek care immediately if:   · You have pain or cramping in your abdomen or low back  · You have heavy vaginal bleeding or clotting  · You pass material that looks like tissue or large clots  Collect the material and bring it with you  Call your doctor or obstetrician if:   · You cannot keep food or drinks down, and you are losing weight  · You have light vaginal bleeding  · You have chills or a fever  · You have vaginal itching, burning, or pain  · You have yellow, green, white, or foul-smelling vaginal discharge      · You have pain or burning when you urinate, less urine than usual, or pink or bloody urine  · You have questions or concerns about your condition or care  How to care for yourself at this stage of your pregnancy:   · Get plenty of rest   You may feel more tired than normal  You may need to take naps or go to bed earlier  · Manage nausea and vomiting  Avoid fatty and spicy foods  Eat small meals throughout the day instead of large meals  Amena may help to decrease nausea  Ask your healthcare provider about other ways of decreasing nausea and vomiting  · Eat a variety of healthy foods  Healthy foods include fruits, vegetables, whole-grain breads, low-fat dairy foods, beans, lean meats, and fish  Drink liquids as directed  Ask how much liquid to drink each day and which liquids are best for you  Limit caffeine to less than 200 milligrams each day  Limit your intake of fish to 2 servings each week  Choose fish low in mercury such as canned light tuna, shrimp, salmon, cod, or tilapia  Do not  eat fish high in mercury such as swordfish, tilefish, michelle mackerel, and shark  · Take prenatal vitamins as directed  Your need for certain vitamins and minerals, such as folic acid, increases during pregnancy  Prenatal vitamins provide some of the extra vitamins and minerals you need  Prenatal vitamins may also help to decrease the risk of certain birth defects  · Do not smoke  Smoking increases your risk of a miscarriage and other health problems during your pregnancy  Smoking can cause your baby to be born too early or weigh less at birth  Ask your healthcare provider for information if you need help quitting  · Do not drink alcohol  Alcohol passes from your body to your baby through the placenta  It can affect your baby's brain development and cause fetal alcohol syndrome (FAS)  FAS is a group of conditions that causes mental, behavior, and growth problems       · Talk to your healthcare provider before you take any medicines  Many medicines may harm your baby if you take them when you are pregnant  Do not take any medicines, vitamins, herbs, or supplements without first talking to your healthcare provider  Never use illegal or street drugs (such as marijuana or cocaine) while you are pregnant  Safety tips during pregnancy:   · Avoid hot tubs and saunas  Do not use a hot tub or sauna while you are pregnant, especially during your first trimester  Hot tubs and saunas may raise your baby's temperature and increase the risk of birth defects  · Avoid toxoplasmosis  This is an infection caused by eating raw meat or being around infected cat feces  It can cause birth defects, miscarriages, and other problems  Wash your hands after you touch raw meat  Make sure any meat is well-cooked before you eat it  Avoid raw eggs and unpasteurized milk  Use gloves or ask someone else to clean your cat's litter box while you are pregnant  Changes happening with your baby: Your baby has fully formed fingernails and toenails  Your baby's heartbeat can now be heard  Ask your healthcare provider if you can listen to your baby's heartbeat  By week 14, your baby is over 4 inches long from the top of the head to the rump (baby's bottom)  Your baby weighs over 3 ounces  Prenatal care:  Prenatal care is a series of visits with your healthcare provider throughout your pregnancy  During the first 28 weeks of your pregnancy, you will see your healthcare provider 1 time each month  Prenatal care can help prevent problems during pregnancy and childbirth  Your healthcare provider will check your blood pressure and weight  Your baby's heart rate will also be checked  You may also need the following at some visits:  · A pelvic exam  allows your healthcare provider to see your cervix (the bottom part of your uterus)  Your healthcare provider will use a speculum to open your vagina   He or she will check the size and shape of your uterus  · Blood tests  may be done to check for any of the following:     ? Gestational diabetes or anemia (low iron level)    ? Blood type or Rh factor, or certain birth defects    ? Immunity to certain diseases, such as chickenpox or rubella    ? An infection, such as a sexually transmitted infection, HIV, or hepatitis B    · Hepatitis B  may need to be prevented or treated  Hepatitis B is inflammation of the liver caused by the hepatitis B virus (HBV)  HBV can spread from a mother to her baby during delivery  You will be checked for HBV as early as possible in the first trimester of each pregnancy  You need the test even if you received the hepatitis B vaccine or were tested before  You may need to have an HBV infection treated before you give birth  · Urine tests  may also be done to check for sugar and protein  These can be signs of gestational diabetes or preeclampsia  Urine tests may also be done to check for signs of infection  · A fetal ultrasound  shows pictures of your baby inside your uterus  The pictures are used to check your baby's development, movement, and position  · Genetic disorder screening tests  may be offered to you  These tests check your baby's risk for genetic disorders such as Down syndrome  A screening test includes a blood test and ultrasound  Follow up with your doctor or obstetrician as directed:  Go to all prenatal visits  Write down your questions so you remember to ask them during your visits  © Copyright [address] Information is for End User's use only and may not be sold, redistributed or otherwise used for commercial purposes  All illustrations and images included in CareNotes® are the copyrighted property of A D A M , Inc  or [address] [place] [address] Annelise   The above information is an  only  It is not intended as medical advice for individual conditions or treatments   Talk to your doctor, nurse or pharmacist before following any medical regimen to see if it is safe and effective for you

## 2024-10-14 DIAGNOSIS — F98.8 ADD (ATTENTION DEFICIT DISORDER) WITHOUT HYPERACTIVITY: ICD-10-CM

## 2024-10-14 RX ORDER — LISDEXAMFETAMINE DIMESYLATE 60 MG/1
60 CAPSULE ORAL EVERY MORNING
Qty: 30 CAPSULE | Refills: 0 | Status: SHIPPED | OUTPATIENT
Start: 2024-10-14

## 2024-11-11 DIAGNOSIS — Z78.9 USES BIRTH CONTROL: ICD-10-CM

## 2024-11-12 RX ORDER — NORGESTIMATE AND ETHINYL ESTRADIOL 0.25-0.035
1 KIT ORAL DAILY
Qty: 84 TABLET | Refills: 1 | Status: SHIPPED | OUTPATIENT
Start: 2024-11-12

## 2024-11-21 DIAGNOSIS — F98.8 ADD (ATTENTION DEFICIT DISORDER) WITHOUT HYPERACTIVITY: ICD-10-CM

## 2024-11-21 RX ORDER — LISDEXAMFETAMINE DIMESYLATE 60 MG/1
60 CAPSULE ORAL EVERY MORNING
Qty: 30 CAPSULE | Refills: 0 | Status: SHIPPED | OUTPATIENT
Start: 2024-11-21

## 2024-12-20 DIAGNOSIS — F98.8 ADD (ATTENTION DEFICIT DISORDER) WITHOUT HYPERACTIVITY: ICD-10-CM

## 2024-12-20 NOTE — TELEPHONE ENCOUNTER
Medication: lisdexamfetamine (Vyvanse) 60 MG capsule     Dose/Frequency: 60 mg/   Take 1 capsule (60 mg total) by mouth every morning Max Daily Amount: 60 mg          Quantity: 30 capsules    Ph[facility]: [facility] [place], [place] - [address]     Office:   [x] PCP/Provider - OSCAR Carreon   [] Speciality/Provider -     Does the patient have enough for 3 days?   [x] Yes   [] No - Send as HP to POD

## 2024-12-23 DIAGNOSIS — F98.8 ADD (ATTENTION DEFICIT DISORDER) WITHOUT HYPERACTIVITY: ICD-10-CM

## 2024-12-23 RX ORDER — LISDEXAMFETAMINE DIMESYLATE 60 MG/1
60 CAPSULE ORAL EVERY MORNING
Qty: 30 CAPSULE | Refills: 0 | Status: SHIPPED | OUTPATIENT
Start: 2024-12-23 | End: 2024-12-23 | Stop reason: SDUPTHER

## 2024-12-23 RX ORDER — LISDEXAMFETAMINE DIMESYLATE 60 MG/1
60 CAPSULE ORAL EVERY MORNING
Qty: 30 CAPSULE | Refills: 0 | Status: SHIPPED | OUTPATIENT
Start: 2024-12-23

## 2024-12-23 NOTE — TELEPHONE ENCOUNTER
Patient called since prescription has not been called in to the pharmacy yet and she is completely out of medication.     Her med check appointment is scheduled for 1/8/25.     Please call her when medication is sent to the pharmacy so that she may pick it up.    Thank you!

## 2024-12-23 NOTE — TELEPHONE ENCOUNTER
Pt called back again in regards to Vyvance refill. She states she put the request in on 12/20 and did not hear back. She has no pills left and does not want to go into withdrawal. Her pharmacy closes at 5pm. Please call patient back.

## 2024-12-23 NOTE — TELEPHONE ENCOUNTER
Pt called in requested for   lisdexamfetamine (Vyvanse) 60 MG [facility] [place] [address] [place] [address]  was sent to [facility] and [facility] does not carry this medication please follow up when med has been sent to [facility]

## 2025-01-14 ENCOUNTER — OFFICE VISIT (OUTPATIENT)
Dept: FAMILY MEDICINE CLINIC | Facility: HOSPITAL | Age: 35
End: 2025-01-14
Payer: COMMERCIAL

## 2025-01-14 VITALS
HEIGHT: 70 IN | OXYGEN SATURATION: 97 % | WEIGHT: 275 LBS | HEART RATE: 78 BPM | TEMPERATURE: 97.4 F | BODY MASS INDEX: 39.37 KG/M2 | SYSTOLIC BLOOD PRESSURE: 110 MMHG | DIASTOLIC BLOOD PRESSURE: 70 MMHG

## 2025-01-14 DIAGNOSIS — E28.2 PCOS (POLYCYSTIC OVARIAN SYNDROME): ICD-10-CM

## 2025-01-14 DIAGNOSIS — F98.8 ADD (ATTENTION DEFICIT DISORDER) WITHOUT HYPERACTIVITY: ICD-10-CM

## 2025-01-14 DIAGNOSIS — F41.1 GENERALIZED ANXIETY DISORDER: ICD-10-CM

## 2025-01-14 DIAGNOSIS — R53.83 OTHER FATIGUE: ICD-10-CM

## 2025-01-14 DIAGNOSIS — F33.0 MILD EPISODE OF RECURRENT MAJOR DEPRESSIVE DISORDER (HCC): Primary | ICD-10-CM

## 2025-01-14 PROCEDURE — 99214 OFFICE O/P EST MOD 30 MIN: CPT | Performed by: NURSE PRACTITIONER

## 2025-01-14 RX ORDER — METFORMIN HYDROCHLORIDE 500 MG/1
500 TABLET, EXTENDED RELEASE ORAL
Qty: 30 TABLET | Refills: 6 | Status: SHIPPED | OUTPATIENT
Start: 2025-01-14

## 2025-01-14 NOTE — ASSESSMENT & PLAN NOTE
Check blood work.   Minimal weight loss and feels it is not helping her hormones.   Advised she schedule with GYN (overdue for yearly) and discuss hormones further.

## 2025-01-14 NOTE — ASSESSMENT & PLAN NOTE
She notes decrease in Vyvanse effectiveness. However we discussed this could be r/t depression/anxiety and lack of sleep.   No change in dose for now. Plan to control depression and anxiety symptoms.

## 2025-01-14 NOTE — PROGRESS NOTES
Name: Sarah Napoles      : 1990      MRN: [id number]  Encounter Provider: OSCAR Carreon  Encounter Date: 2025   Encounter department: [facility] CARE [address]   :  Assessment & Plan  Mild episode of recurrent major depressive disorder (HCC)  Depression Screening Follow-up Plan: Patient's depression screening was positive with a PHQ-9 score of 7. Patient with underlying depression and was advised to continue current medications as prescribed.    She reports increase in depressive symptoms, especially fatigue. She is not sleeping due to young children.   We discussed the effect lack of sleep can have physically and mentally.   Will hold off on any sertraline dose change and check labs.   F/U in 4 weeks.        Generalized anxiety disorder  See above note       ADD (attention deficit disorder) without hyperactivity  She notes decrease in Vyvanse effectiveness. However we discussed this could be r/t depression/anxiety and lack of sleep.   No change in dose for now. Plan to control depression and anxiety symptoms.        PCOS (polycystic ovarian syndrome)  Check blood work.   Minimal weight loss and feels it is not helping her hormones.   Advised she schedule with GYN (overdue for yearly) and discuss hormones further.              Depression Screening and Follow-up Plan:   Patient with underlying depression and was advised to continue current medications as prescribed.     History of Present Illness     She is feeling very tired. Increased depressive and anxiety symptoms. Does not feel like Vyvanse is as effective. She only sleeps 2-3 hours/night due to young children waking up.   Continues on metformin with minimal weight loss. Feels her hormones are not balanced. Reports she was on metformin by fertility specialist in the past to help balance her hormones and regulate periods.       Review of Systems   Constitutional:  Positive for appetite change and fatigue.  "  Psychiatric/Behavioral:  Positive for agitation, decreased concentration and sleep disturbance. The patient is nervous/anxious and is hyperactive.        Objective   /70 (Patient Position: Sitting, Cuff Size: Standard)   Pulse 78   Temp (!) 97.4 °F (36.3 °C) (Tympanic)   Ht 5' 10\" (1.778 m)   Wt 125 kg (275 lb)   SpO2 97%   BMI 39.46 kg/m²      Physical Exam  Vitals reviewed.   Constitutional:       Appearance: Normal appearance. She is obese.   Cardiovascular:      Rate and Rhythm: Normal rate and regular rhythm.      Heart sounds: Normal heart sounds. No murmur heard.  Pulmonary:      Effort: Pulmonary effort is normal.      Breath sounds: Normal breath sounds.   Skin:     General: Skin is warm and dry.   Neurological:      Mental Status: She is alert and oriented to person, place, and time.   Psychiatric:         Mood and Affect: Mood normal.         Behavior: Behavior normal.         Thought Content: Thought content normal.         Judgment: Judgment normal.         "

## 2025-01-14 NOTE — ASSESSMENT & PLAN NOTE
Depression Screening Follow-up Plan: Patient's depression screening was positive with a PHQ-9 score of 7. Patient with underlying depression and was advised to continue current medications as prescribed.    She reports increase in depressive symptoms, especially fatigue. She is not sleeping due to young children.   We discussed the effect lack of sleep can have physically and mentally.   Will hold off on any sertraline dose change and check labs.   F/U in 4 weeks.

## 2025-01-24 DIAGNOSIS — F98.8 ADD (ATTENTION DEFICIT DISORDER) WITHOUT HYPERACTIVITY: ICD-10-CM

## 2025-01-27 RX ORDER — LISDEXAMFETAMINE DIMESYLATE 60 MG/1
60 CAPSULE ORAL EVERY MORNING
Qty: 30 CAPSULE | Refills: 0 | Status: SHIPPED | OUTPATIENT
Start: 2025-01-27

## 2025-02-28 DIAGNOSIS — F98.8 ADD (ATTENTION DEFICIT DISORDER) WITHOUT HYPERACTIVITY: ICD-10-CM

## 2025-03-03 RX ORDER — LISDEXAMFETAMINE DIMESYLATE 60 MG/1
60 CAPSULE ORAL EVERY MORNING
Qty: 30 CAPSULE | Refills: 0 | Status: SHIPPED | OUTPATIENT
Start: 2025-03-03

## 2025-04-04 DIAGNOSIS — F98.8 ADD (ATTENTION DEFICIT DISORDER) WITHOUT HYPERACTIVITY: ICD-10-CM

## 2025-04-04 RX ORDER — LISDEXAMFETAMINE DIMESYLATE 60 MG/1
60 CAPSULE ORAL EVERY MORNING
Qty: 30 CAPSULE | Refills: 0 | Status: SHIPPED | OUTPATIENT
Start: 2025-04-04

## 2025-04-05 RX ORDER — LISDEXAMFETAMINE DIMESYLATE 60 MG/1
60 CAPSULE ORAL EVERY MORNING
Qty: 30 CAPSULE | Refills: 0 | OUTPATIENT
Start: 2025-04-05

## 2025-04-08 NOTE — TELEPHONE ENCOUNTER
Left message - advising if she still needs appointment to return call, otherwise continue symptomatic treatment  MATA  ATN  Hyperkalemia    Will do HD today for clearance via TDC. Pt will need a tunneled catheter, please follow up with IR regarding placement.

## 2025-04-18 ENCOUNTER — APPOINTMENT (OUTPATIENT)
Dept: LAB | Facility: CLINIC | Age: 35
End: 2025-04-18
Payer: COMMERCIAL

## 2025-04-18 DIAGNOSIS — E66.9 OBESITY, UNSPECIFIED CLASS, UNSPECIFIED OBESITY TYPE, UNSPECIFIED WHETHER SERIOUS COMORBIDITY PRESENT: ICD-10-CM

## 2025-04-19 ENCOUNTER — RESULTS FOLLOW-UP (OUTPATIENT)
Dept: FAMILY MEDICINE CLINIC | Facility: HOSPITAL | Age: 35
End: 2025-04-19

## 2025-04-22 ENCOUNTER — OFFICE VISIT (OUTPATIENT)
Dept: FAMILY MEDICINE CLINIC | Facility: HOSPITAL | Age: 35
End: 2025-04-22
Payer: COMMERCIAL

## 2025-04-22 VITALS
WEIGHT: 276 LBS | BODY MASS INDEX: 39.51 KG/M2 | DIASTOLIC BLOOD PRESSURE: 64 MMHG | OXYGEN SATURATION: 97 % | TEMPERATURE: 98.2 F | HEIGHT: 70 IN | SYSTOLIC BLOOD PRESSURE: 112 MMHG | HEART RATE: 90 BPM

## 2025-04-22 DIAGNOSIS — F98.8 ADD (ATTENTION DEFICIT DISORDER) WITHOUT HYPERACTIVITY: ICD-10-CM

## 2025-04-22 DIAGNOSIS — E55.9 VITAMIN D INSUFFICIENCY: ICD-10-CM

## 2025-04-22 DIAGNOSIS — E28.2 PCOS (POLYCYSTIC OVARIAN SYNDROME): ICD-10-CM

## 2025-04-22 DIAGNOSIS — F33.0 MILD EPISODE OF RECURRENT MAJOR DEPRESSIVE DISORDER (HCC): Primary | ICD-10-CM

## 2025-04-22 DIAGNOSIS — F41.1 GENERALIZED ANXIETY DISORDER: ICD-10-CM

## 2025-04-22 PROCEDURE — 99214 OFFICE O/P EST MOD 30 MIN: CPT | Performed by: NURSE PRACTITIONER

## 2025-04-22 RX ORDER — LISDEXAMFETAMINE DIMESYLATE 70 MG/1
70 CAPSULE ORAL EVERY MORNING
Qty: 30 CAPSULE | Refills: 0 | Status: SHIPPED | OUTPATIENT
Start: 2025-04-22

## 2025-04-22 NOTE — ASSESSMENT & PLAN NOTE
Depression Screening Follow-up Plan: Patient's depression screening was positive with a PHQ-9 score of 7. Patient with underlying depression and was advised to continue current medications as prescribed.  She does have depressive symptoms, however her biggest concern if her ADD.   Has not tolerated higher doses of sertraline.   Plan to increase vyvanse to 70 mg.   F/U in 4 weeks and if mood is not improved consider adding in Wellbutrin.

## 2025-04-22 NOTE — PROGRESS NOTES
Name: Sarah Napoles      : 1990      MRN: [id number]  Encounter Provider: OSCAR Carreon  Encounter Date: 2025   Encounter department: [facility] CARE [address]   :  Assessment & Plan  Mild episode of recurrent major depressive disorder (HCC)  Depression Screening Follow-up Plan: Patient's depression screening was positive with a PHQ-9 score of 7. Patient with underlying depression and was advised to continue current medications as prescribed.  She does have depressive symptoms, however her biggest concern if her ADD.   Has not tolerated higher doses of sertraline.   Plan to increase vyvanse to 70 mg.   F/U in 4 weeks and if mood is not improved consider adding in Wellbutrin.        Generalized anxiety disorder  RUDOLPH-7=8  See above plan       ADD (attention deficit disorder) without hyperactivity  Increase Vyvanse to 70 mg.   Consider adding in Wellbutrin if not effective.   Orders:    lisdexamfetamine (VYVANSE) 70 MG capsule; Take 1 capsule (70 mg total) by mouth every morning Max Daily Amount: 70 mg    PCOS (polycystic ovarian syndrome)  Continues on metformin.   A1C is 5.1.        Vitamin D insufficiency  Vit D is 26.   Advised starting 5000 iu Vit D3 daily.               History of Present Illness   Mood is not great. Quitting her job has helped. Does not feel like herself. Feels a heaviness.   Does not feel Vyvanse is working for her. Typically when she takes Vyvanse in about 40 minutes she is able to get more organized. Now it does not kick in the same way. It is still helping but organization in brain is not the same. Issues are with tasking. Gets overwhelmed with planning tasks. Feels stalled.       Review of Systems   Constitutional:  Positive for appetite change and fatigue.   Psychiatric/Behavioral:  Positive for agitation, dysphoric mood and sleep disturbance. Negative for decreased concentration and suicidal ideas. The patient is nervous/anxious and is  "hyperactive (restless).        Objective   /64 (Patient Position: Sitting, Cuff Size: Standard)   Pulse 90   Temp 98.2 °F (36.8 °C) (Tympanic)   Ht 5' 10\" (1.778 m)   Wt 125 kg (276 lb)   SpO2 97%   BMI 39.60 kg/m²      Physical Exam  Vitals reviewed.   Constitutional:       Appearance: Normal appearance. She is obese.   Cardiovascular:      Rate and Rhythm: Normal rate and regular rhythm.      Heart sounds: Normal heart sounds. No murmur heard.  Pulmonary:      Effort: Pulmonary effort is normal.      Breath sounds: Normal breath sounds.   Skin:     General: Skin is warm and dry.   Neurological:      Mental Status: She is alert and oriented to person, place, and time.   Psychiatric:         Mood and Affect: Mood normal.         Behavior: Behavior normal.         Thought Content: Thought content normal.         Judgment: Judgment normal.         "

## 2025-04-22 NOTE — ASSESSMENT & PLAN NOTE
Increase Vyvanse to 70 mg.   Consider adding in Wellbutrin if not effective.   Orders:    lisdexamfetamine (VYVANSE) 70 MG capsule; Take 1 capsule (70 mg total) by mouth every morning Max Daily Amount: 70 mg

## 2025-04-24 DIAGNOSIS — Z78.9 USES BIRTH CONTROL: ICD-10-CM

## 2025-04-24 RX ORDER — NORGESTIMATE AND ETHINYL ESTRADIOL 0.25-0.035
1 KIT ORAL DAILY
Qty: 28 TABLET | Refills: 1 | Status: SHIPPED | OUTPATIENT
Start: 2025-04-24

## 2025-04-24 NOTE — TELEPHONE ENCOUNTER
Who called:STAFF     Is the patient Pregnant ? No  If so, How many weeks? N/A    Reason for the Call: Medication refill request/schedule annual exam    Action Taken: LVM & sent MyChart message      Outcome/Plan/ Recommendations:  lvm & sent the following [employer] message - The prescription you requested was sent to the pharmacy as a courtesy refill. Last office visit was 6/10/24, please call the office at [phone] to schedule an annual exam.

## 2025-05-05 DIAGNOSIS — F98.8 ADD (ATTENTION DEFICIT DISORDER) WITHOUT HYPERACTIVITY: ICD-10-CM

## 2025-05-05 RX ORDER — LISDEXAMFETAMINE DIMESYLATE 70 MG/1
70 CAPSULE ORAL EVERY MORNING
Qty: 30 CAPSULE | Refills: 0 | Status: CANCELLED | OUTPATIENT
Start: 2025-05-05

## 2025-05-22 DIAGNOSIS — Z78.9 USES BIRTH CONTROL: ICD-10-CM

## 2025-05-22 NOTE — TELEPHONE ENCOUNTER
Patient called in for refill and is scheduled for June 2nd for appt. Confirmed pharmacy and patient on last week of it. Needs a refill. Has 6 days left. Any concerns please reach out to patient.   Thank You

## 2025-05-23 RX ORDER — NORGESTIMATE AND ETHINYL ESTRADIOL 0.25-0.035
1 KIT ORAL DAILY
Qty: 28 TABLET | Refills: 1 | Status: SHIPPED | OUTPATIENT
Start: 2025-05-23

## 2025-06-02 ENCOUNTER — TELEPHONE (OUTPATIENT)
Age: 35
End: 2025-06-02

## 2025-06-02 NOTE — TELEPHONE ENCOUNTER
A user error has taken place: encounter opened in error, closed for administrative reasons.    Birth Sex: Female      Prenatal Complications:     Admitted From: labor/delivery    Place of Birth:     Resuscitation: Attended  at the request of Dr. Hannah. Mary Esther vigorous at time of birth. Mary Esther with strong spontaneous cry, displaying adequate color and tone. Delayed clamping performed. Brought to warmer, dried and stimulated. Hat placed on head. Bulb and deep suction performed to mouth and nose for fluid noted in airway. Chest therapy also performed.  given CPAP PEEP 5 FiO2 0.21 for 15 minutes for retractions and tachypnea. Symptoms resolved. Mary Esther well-appearing, in no distress, no need for further intervention. Will be admitted to [facility]. Apgars 9/9.      APGAR Scores:   1min:9                                                          5min: 9     10 min: --     Birth Sex: Female      Prenatal Complications:     Admitted From: labor/delivery    Place of Birth:     Resuscitation: Attended  at the request of Dr. Hannah. [place] vigorous at time of birth. Richmond with strong spontaneous cry, displaying adequate color and tone. Delayed clamping performed. Brought to warmer, dried and stimulated. Hat placed on head. Bulb and deep suction performed to mouth and nose for fluid noted in airway. Chest therapy also performed.  given CPAP PEEP 5 FiO2 0.21 for 15 minutes for retractions and tachypnea. Symptoms resolved. Richmond well-appearing, in no distress, no need for further intervention. Will be admitted to [facility]. Apgars 9/9.      APGAR Scores:   1min:9                                                          5min: 9

## 2025-06-03 ENCOUNTER — TELEPHONE (OUTPATIENT)
Dept: FAMILY MEDICINE CLINIC | Facility: HOSPITAL | Age: 35
End: 2025-06-03

## 2025-06-03 ENCOUNTER — OFFICE VISIT (OUTPATIENT)
Dept: FAMILY MEDICINE CLINIC | Facility: HOSPITAL | Age: 35
End: 2025-06-03
Payer: COMMERCIAL

## 2025-06-03 VITALS
TEMPERATURE: 97.2 F | SYSTOLIC BLOOD PRESSURE: 120 MMHG | OXYGEN SATURATION: 97 % | BODY MASS INDEX: 38.6 KG/M2 | DIASTOLIC BLOOD PRESSURE: 72 MMHG | HEART RATE: 98 BPM | WEIGHT: 269 LBS

## 2025-06-03 DIAGNOSIS — F98.8 ADD (ATTENTION DEFICIT DISORDER) WITHOUT HYPERACTIVITY: Primary | ICD-10-CM

## 2025-06-03 DIAGNOSIS — F41.1 GENERALIZED ANXIETY DISORDER: ICD-10-CM

## 2025-06-03 DIAGNOSIS — F33.0 MILD EPISODE OF RECURRENT MAJOR DEPRESSIVE DISORDER (HCC): ICD-10-CM

## 2025-06-03 PROCEDURE — 99214 OFFICE O/P EST MOD 30 MIN: CPT | Performed by: NURSE PRACTITIONER

## 2025-06-03 RX ORDER — BUPROPION HYDROCHLORIDE 150 MG/1
150 TABLET ORAL EVERY MORNING
Qty: 30 TABLET | Refills: 1 | Status: SHIPPED | OUTPATIENT
Start: 2025-06-03 | End: 2026-05-29

## 2025-06-03 NOTE — PROGRESS NOTES
Name: Sarah Napoles      : 1990      MRN: [id number]  Encounter Provider: OSCAR Carreon  Encounter Date: 6/3/2025   Encounter department: [facility]ARE [address]   :  Assessment & Plan  ADD (attention deficit disorder) without hyperactivity  Minimal improvement with increase of Vyvanse to 70 mg.   As we discussed at her last appointment I do feel her symptoms are more r/t depression and she does agree this could be the case.   No change in Vyvanse dose.        Mild episode of recurrent major depressive disorder (HCC)  She does admit to feeling more depressed since birth of her son.   She had issues with sertraline increase in the past.   Will add in Wellbutrin and continue on 50 mg sertraline.   Possible benefit with ADHD also. \  AE discussed.   F/U in 4 weeks.     Orders:    buPROPion (WELLBUTRIN XL) 150 mg 24 hr tablet; Take 1 tablet (150 mg total) by mouth every morning    Generalized anxiety disorder  This is controlled.               History of Present Illness   Last appointment Vyvanse was increased to 70 mg. She feels this has helped a little. Feels it lasts a little longer. Most effective right after she takes it but overall does not notice a big difference. She admits she is feeling depressed since the birth of her son. She does not know why because everything is good. She just feels a heaviness. She is looking into therapy. She has been taking the kids to the park and walking 3 miles/day and trying to get outside as much as possible. She is on sertraline 50 mg. We have attempted to go up to 75 mg and she did not like the way it made her feel. Made her emotionless.       Review of Systems   Psychiatric/Behavioral:  Positive for decreased concentration and dysphoric mood. Negative for suicidal ideas.        Objective   /72 (Patient Position: Sitting, Cuff Size: Standard)   Pulse 98   Temp (!) 97.2 °F (36.2 °C) (Tympanic)   Wt 122 kg (269 lb)   SpO2 97%    BMI 38.60 kg/m²      Physical Exam  Vitals reviewed.   Constitutional:       Appearance: Normal appearance.     Cardiovascular:      Rate and Rhythm: Normal rate and regular rhythm.      Heart sounds: Normal heart sounds. No murmur heard.  Pulmonary:      Effort: Pulmonary effort is normal.      Breath sounds: Normal breath sounds.     Skin:     General: Skin is warm and dry.     Neurological:      Mental Status: She is alert and oriented to person, place, and time.     Psychiatric:         Mood and Affect: Mood normal.         Behavior: Behavior normal.         Thought Content: Thought content normal.         Judgment: Judgment normal.

## 2025-06-03 NOTE — ASSESSMENT & PLAN NOTE
She does admit to feeling more depressed since birth of her son.   She had issues with sertraline increase in the past.   Will add in Wellbutrin and continue on 50 mg sertraline.   Possible benefit with ADHD also. \  AE discussed.   F/U in 4 weeks.     Orders:    buPROPion (WELLBUTRIN XL) 150 mg 24 hr tablet; Take 1 tablet (150 mg total) by mouth every morning

## 2025-06-03 NOTE — TELEPHONE ENCOUNTER
Pt returning call; she stated she can try and make it for the new time but it's not a guarantee, she does have other responsibilities at home and that is why she scheduled for the later time.

## 2025-06-03 NOTE — ASSESSMENT & PLAN NOTE
Minimal improvement with increase of Vyvanse to 70 mg.   As we discussed at her last appointment I do feel her symptoms are more r/t depression and she does agree this could be the case.   No change in Vyvanse dose.

## 2025-06-04 ENCOUNTER — OFFICE VISIT (OUTPATIENT)
Dept: OBGYN CLINIC | Facility: MEDICAL CENTER | Age: 35
End: 2025-06-04
Payer: COMMERCIAL

## 2025-06-04 VITALS
HEIGHT: 70 IN | WEIGHT: 270.1 LBS | SYSTOLIC BLOOD PRESSURE: 128 MMHG | BODY MASS INDEX: 38.67 KG/M2 | DIASTOLIC BLOOD PRESSURE: 74 MMHG

## 2025-06-04 DIAGNOSIS — Z30.41 SURVEILLANCE FOR BIRTH CONTROL, ORAL CONTRACEPTIVES: ICD-10-CM

## 2025-06-04 PROCEDURE — 99213 OFFICE O/P EST LOW 20 MIN: CPT | Performed by: OBSTETRICS & GYNECOLOGY

## 2025-06-04 RX ORDER — NORGESTIMATE AND ETHINYL ESTRADIOL 0.25-0.035
1 KIT ORAL DAILY
Qty: 84 TABLET | Refills: 1 | Status: SHIPPED | OUTPATIENT
Start: 2025-06-04

## 2025-06-04 NOTE — PROGRESS NOTES
Assessment Sarah was seen today for contraception.    Diagnoses and all orders for this visit:    Surveillance for birth control, oral contraceptives  -     norgestimate-ethinyl estradiol (Mary Beth) 0.25-35 MG-MCG per tablet; Take 1 tablet by mouth daily         Plan  Refill of current birth control pill sent to patient's pharmacy.  Patient may discontinue the pills once her  has a vasectomy.  Patient will monitor her menses and her mood swings.  Patient is also overdue for routine gynecologic exam.  She will make that appointment today.    Subjective   Sarah Napoles is a 35 y.o.  female here for a f/u visit.  Pt reports she has significant mood swings during her menses.   is getting vasectomy; has appt.  May discontinue bcp's at that time.    Problem List[1]    Gynecologic History  Patient's last menstrual period was 2025.  The current method of family planning is OCP (estrogen/progesterone).    Past Medical History[2]  Past Surgical History[3]  Family History[4]  Social History     Socioeconomic History    Marital status: /Civil Union     Spouse name: Not on file    Number of children: Not on file    Years of education: Not on file    Highest education level: Not on file   Occupational History    Occupation: FULL-TIME EMPLOYMENT   Tobacco Use    Smoking status: Former     Current packs/day: 0.00     Types: Cigarettes     Quit date: 2019     Years since quittin.9    Smokeless tobacco: Never    Tobacco comments:     Quit   Vaping Use    Vaping status: Never Used   Substance and Sexual Activity    Alcohol use: Not Currently     Comment: sober 10 years    Drug use: Not Currently     Types: Marijuana     Comment: rare - none with preg    Sexual activity: Yes     Partners: Male     Birth control/protection: None   Other Topics Concern    Not on file   Social History Narrative    DAILY [employer], 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     Social Drivers of Health     Financial Resource  "Strain: Not on file   Food Insecurity: Not on file   Transportation Needs: Not on file   Physical Activity: Not on file   Stress: Not on file   Social Connections: Not on file   Intimate Partner Violence: Not on file   Housing Stability: Not on file     Allergies[5]  Current Medications[6]    Review of Systems  Constitutional :no fever, feels well, no tiredness, no recent weight gain or loss  ENT: no ear ache, no loss of hearing, no nosebleeds or nasal discharge, no sore throat or hoarseness.  Cardiovascular: no complaints of slow or fast heart beat, no chest pain, no palpitations, no leg claudication or lower extremity edema.  Respiratory: no complaints of shortness of shortness of breath, no REYES  Breasts:no complaints of breast pain, breast lump, or nipple discharge  Gastrointestinal: no complaints of abdominal pain, constipation, nausea, vomiting, or diarrhea or bloody stools  Genitourinary : no complaints of dysuria, incontinence, pelvic pain, no dysmenorrhea, vaginal discharge or abnormal vaginal bleeding and as noted in HPI.  Musculoskeletal: no complaints of arthralgia, no myalgia, no joint swelling or stiffness, no limb pain or swelling.  Integumentary: no complaints of skin rash or lesion, itching or dry skin  Neurological: no complaints of headache, no confusion, no numbness or tingling, no dizziness or fainting     Objective     /74   Ht 5' 10\" (1.778 m)   Wt 123 kg (270 lb 1.6 oz)   LMP 05/20/2025   BMI 38.76 kg/m²     General Appears stated age, cooperative, alert normal mood and affect   Psychiatric oriented to person, place and time.  Mood and affect normal             [1]   Patient Active Problem List  Diagnosis    ADD (attention deficit disorder) without hyperactivity    Generalized anxiety disorder    PCOS (polycystic ovarian syndrome)    Mild episode of recurrent major depressive disorder (HCC)    Vitamin D insufficiency   [2]   Past Medical History:  Diagnosis Date    Alcohol abuse     " in past    Alcoholism (HCC)     Anxiety     Arthritis     bilateral hips    Cyst of breast, left     Depression     Female infertility     Miscarriage     Polycystic ovary syndrome     Substance abuse (HCC)     Varicella    [3]   Past Surgical History:  Procedure Laterality Date     SECTION      DILATION AND EVACUATION  2019    [place]  DELIVERY ONLY N/A 2021    Procedure:  SECTION ();  Surgeon: Vivian Rosales MD;  Location: [facility] ;  Service: Obstetrics    [place]  DELIVERY ONLY N/A 02/15/2024    Procedure:  SECTION () REPEAT;  Surgeon: Neeta Juarez MD;  Location: [facility];  Service: Obstetrics    CA INDUCED  DILATION & EVACUATION N/A 2019    Procedure: DILATATION AND EVACUATION (D&E) 15 Weeks;  Surgeon: Aileen Gorman MD;  Location: [facility];  Service: Gynecology    TONSILLECTOMY     [4]   Family History  Problem Relation Name Age of Onset    No Known Problems Mother      Substance Abuse Father          father  in  (Bertram Mata)    Alcohol abuse Father      Other Father           in     Alcohol abuse Sister      Depression Sister      Substance Abuse Sister      Substance Abuse Brother      Depression Brother      Alcohol abuse Brother      No Known Problems Daughter      No Known Problems Maternal Grandmother      Deep vein thrombosis Paternal Grandmother Ryan Seymour     Coronary artery disease Paternal Grandfather      Arthritis Paternal Grandfather      Breast cancer Neg Hx      Colon cancer Neg Hx      Ovarian cancer Neg Hx     [5] No Known Allergies  [6]   Current Outpatient Medications:     buPROPion (WELLBUTRIN XL) 150 mg 24 hr tablet, Take 1 tablet (150 mg total) by mouth every morning, Disp: 30 tablet, Rfl: 1    lisdexamfetamine (VYVANSE) 70 MG capsule, Take 1 capsule (70 mg total) by mouth every morning Max Daily Amount: 70 mg, Disp: 30 capsule, Rfl: 0    loratadine (CLARITIN) 10 mg tablet, Take 10  mg by mouth in the morning., Disp: , Rfl:     metFORMIN (GLUCOPHAGE-XR) 500 mg 24 hr tablet, TAKE 1 TABLET BY MOUTH EVERY DAY WITH DINNER, Disp: 30 tablet, Rfl: 6    norgestimate-ethinyl estradiol (Mary Beth) 0.25-35 MG-MCG per tablet, Take 1 tablet by mouth daily, Disp: 84 tablet, Rfl: 1    sertraline (ZOLOFT) 50 mg tablet, TAKE 1 TABLET BY MOUTH EVERY DAY, Disp: 30 tablet, Rfl: 6

## 2025-06-06 DIAGNOSIS — F98.8 ADD (ATTENTION DEFICIT DISORDER) WITHOUT HYPERACTIVITY: ICD-10-CM

## 2025-06-09 RX ORDER — LISDEXAMFETAMINE DIMESYLATE 70 MG/1
70 CAPSULE ORAL EVERY MORNING
Qty: 30 CAPSULE | Refills: 0 | Status: SHIPPED | OUTPATIENT
Start: 2025-06-09

## 2025-06-29 DIAGNOSIS — F33.0 MILD EPISODE OF RECURRENT MAJOR DEPRESSIVE DISORDER (HCC): ICD-10-CM

## 2025-07-01 RX ORDER — BUPROPION HYDROCHLORIDE 150 MG/1
150 TABLET ORAL EVERY MORNING
Qty: 90 TABLET | Refills: 1 | Status: SHIPPED | OUTPATIENT
Start: 2025-07-01

## 2025-07-07 DIAGNOSIS — F98.8 ADD (ATTENTION DEFICIT DISORDER) WITHOUT HYPERACTIVITY: ICD-10-CM

## 2025-07-08 RX ORDER — LISDEXAMFETAMINE DIMESYLATE 70 MG/1
70 CAPSULE ORAL EVERY MORNING
Qty: 30 CAPSULE | Refills: 0 | Status: SHIPPED | OUTPATIENT
Start: 2025-07-08

## 2025-07-29 ENCOUNTER — OFFICE VISIT (OUTPATIENT)
Dept: FAMILY MEDICINE CLINIC | Facility: HOSPITAL | Age: 35
End: 2025-07-29
Payer: COMMERCIAL

## 2025-07-29 VITALS
HEIGHT: 70 IN | SYSTOLIC BLOOD PRESSURE: 118 MMHG | TEMPERATURE: 98.1 F | OXYGEN SATURATION: 98 % | HEART RATE: 78 BPM | BODY MASS INDEX: 38.51 KG/M2 | DIASTOLIC BLOOD PRESSURE: 72 MMHG | WEIGHT: 269 LBS

## 2025-07-29 DIAGNOSIS — F98.8 ADD (ATTENTION DEFICIT DISORDER) WITHOUT HYPERACTIVITY: ICD-10-CM

## 2025-07-29 DIAGNOSIS — F41.1 GENERALIZED ANXIETY DISORDER: ICD-10-CM

## 2025-07-29 DIAGNOSIS — F33.0 MILD EPISODE OF RECURRENT MAJOR DEPRESSIVE DISORDER (HCC): Primary | ICD-10-CM

## 2025-07-29 PROCEDURE — 99214 OFFICE O/P EST MOD 30 MIN: CPT | Performed by: NURSE PRACTITIONER

## 2025-07-29 RX ORDER — BUPROPION HYDROCHLORIDE 300 MG/1
300 TABLET ORAL EVERY MORNING
Qty: 30 TABLET | Refills: 1 | Status: SHIPPED | OUTPATIENT
Start: 2025-07-29 | End: 2025-08-08 | Stop reason: SDUPTHER

## 2025-08-08 DIAGNOSIS — F98.8 ADD (ATTENTION DEFICIT DISORDER) WITHOUT HYPERACTIVITY: ICD-10-CM

## 2025-08-08 RX ORDER — LISDEXAMFETAMINE DIMESYLATE 70 MG/1
70 CAPSULE ORAL EVERY MORNING
Qty: 30 CAPSULE | Refills: 0 | Status: SHIPPED | OUTPATIENT
Start: 2025-08-08

## (undated) DEVICE — SUT VICRYL 0 CT 36 IN J958H

## (undated) DEVICE — PVC URETHRAL CATHETER: Brand: DOVER

## (undated) DEVICE — GLOVE PI ULTRA TOUCH SZ.7.0

## (undated) DEVICE — GLOVE INDICATOR PI UNDERGLOVE SZ 7 BLUE

## (undated) DEVICE — SUT PLAIN 3-0 CT-1 27 IN 842H

## (undated) DEVICE — BETHLEHEM UNIVERSAL MINOR VAG: Brand: CARDINAL HEALTH

## (undated) DEVICE — SUT VICRYL 0 CTX 36 IN J978H

## (undated) DEVICE — ADHESIVE SKIN HIGH VISCOSITY EXOFIN 1ML

## (undated) DEVICE — KIT,BETHLEHEM C SECT DELIVERY: Brand: CARDINAL HEALTH

## (undated) DEVICE — GLOVE INDICATOR PI UNDERGLOVE SZ 6.5 BLUE

## (undated) DEVICE — GLOVE PI ULTRA TOUCH SZ.6.5

## (undated) DEVICE — MEDI-VAC YANKAUER SUCTION HANDLE W/STRAIGHT TIP & CONTROL VENT: Brand: CARDINAL HEALTH

## (undated) DEVICE — CHLORAPREP HI-LITE 26ML ORANGE

## (undated) DEVICE — SUT MONOCRYL 4-0 PS-2 27 IN Y426H